# Patient Record
Sex: FEMALE | Race: WHITE | NOT HISPANIC OR LATINO | Employment: OTHER | URBAN - METROPOLITAN AREA
[De-identification: names, ages, dates, MRNs, and addresses within clinical notes are randomized per-mention and may not be internally consistent; named-entity substitution may affect disease eponyms.]

---

## 2023-09-05 PROBLEM — M93.90 OSTEOCHONDROPATHY: Status: ACTIVE | Noted: 2023-09-05

## 2023-09-05 PROBLEM — C50.919 BREAST CANCER (MULTI): Status: ACTIVE | Noted: 2023-09-05

## 2023-09-05 PROBLEM — M62.579: Status: ACTIVE | Noted: 2023-09-05

## 2023-09-05 PROBLEM — E55.9 VITAMIN D DEFICIENCY: Status: ACTIVE | Noted: 2023-09-05

## 2023-09-05 PROBLEM — M21.70 LEG LENGTH DISCREPANCY: Status: ACTIVE | Noted: 2023-09-05

## 2023-09-05 PROBLEM — E78.00 ELEVATED CHOLESTEROL: Status: ACTIVE | Noted: 2023-09-05

## 2023-09-05 PROBLEM — R73.01 IFG (IMPAIRED FASTING GLUCOSE): Status: ACTIVE | Noted: 2023-09-05

## 2023-09-05 PROBLEM — M21.611 BUNION OF RIGHT FOOT: Status: ACTIVE | Noted: 2023-09-05

## 2023-09-05 PROBLEM — M20.41 ACQUIRED HAMMER TOE OF RIGHT FOOT: Status: ACTIVE | Noted: 2023-09-05

## 2023-09-05 PROBLEM — M19.071: Status: ACTIVE | Noted: 2023-09-05

## 2023-09-05 PROBLEM — G62.9 NEUROPATHY: Status: ACTIVE | Noted: 2023-09-05

## 2023-09-05 PROBLEM — D32.9 MENINGIOMA (MULTI): Status: ACTIVE | Noted: 2023-09-05

## 2023-09-05 PROBLEM — Z85.3 PERSONAL HISTORY OF MALIGNANT NEOPLASM OF BREAST: Status: ACTIVE | Noted: 2023-09-05

## 2023-09-05 PROBLEM — F41.9 ANXIETY: Status: ACTIVE | Noted: 2023-09-05

## 2023-09-05 PROBLEM — S32.509D: Status: ACTIVE | Noted: 2023-09-05

## 2023-09-05 PROBLEM — R20.0 NUMBNESS: Status: ACTIVE | Noted: 2023-09-05

## 2023-09-05 PROBLEM — M77.8 FLEXOR HALLUCIS LONGUS TENDINITIS: Status: ACTIVE | Noted: 2023-09-05

## 2023-09-05 PROBLEM — M79.605 PAIN OF LEFT LOWER EXTREMITY: Status: ACTIVE | Noted: 2023-09-05

## 2023-09-05 PROBLEM — M79.604 PAIN OF RIGHT LOWER EXTREMITY: Status: ACTIVE | Noted: 2023-09-05

## 2023-09-05 PROBLEM — G93.89 BRAIN MASS: Status: ACTIVE | Noted: 2023-09-05

## 2023-09-05 PROBLEM — G57.61 MORTON'S NEUROMA OF RIGHT FOOT: Status: ACTIVE | Noted: 2023-09-05

## 2023-09-05 PROBLEM — I10 ESSENTIAL HYPERTENSION: Status: ACTIVE | Noted: 2023-09-05

## 2023-09-05 RX ORDER — TURMERIC ROOT EXTRACT 500 MG
1 TABLET ORAL 2 TIMES DAILY
COMMUNITY

## 2023-09-05 RX ORDER — SERTRALINE HYDROCHLORIDE 25 MG/1
1 TABLET, FILM COATED ORAL DAILY
COMMUNITY
Start: 2023-02-01 | End: 2023-12-29 | Stop reason: DRUGHIGH

## 2023-09-05 RX ORDER — CALCIUM CARB/VITAMIN D3/VIT K1 500-500-40
1 TABLET,CHEWABLE ORAL DAILY
COMMUNITY
Start: 2018-12-13

## 2023-09-05 RX ORDER — ATENOLOL 50 MG/1
1 TABLET ORAL DAILY
COMMUNITY

## 2023-09-05 RX ORDER — LETROZOLE 2.5 MG/1
1 TABLET, FILM COATED ORAL
COMMUNITY
Start: 2023-08-22

## 2023-09-05 RX ORDER — LOSARTAN POTASSIUM AND HYDROCHLOROTHIAZIDE 12.5; 1 MG/1; MG/1
1 TABLET ORAL DAILY
COMMUNITY
Start: 2021-07-16 | End: 2023-10-11 | Stop reason: SDUPTHER

## 2023-09-05 RX ORDER — ALENDRONATE SODIUM 70 MG/1
1 TABLET ORAL
COMMUNITY
Start: 2018-12-13 | End: 2023-12-29 | Stop reason: ALTCHOICE

## 2023-09-05 RX ORDER — GLUCOSAM/CHONDRO/HERB 149/HYAL 750-100 MG
1 TABLET ORAL DAILY
COMMUNITY

## 2023-09-05 RX ORDER — PRAVASTATIN SODIUM 40 MG/1
1 TABLET ORAL DAILY
COMMUNITY
End: 2024-01-25

## 2023-09-05 RX ORDER — ATENOLOL 25 MG/1
1 TABLET ORAL DAILY
COMMUNITY
Start: 2022-11-23 | End: 2023-10-02 | Stop reason: DRUGHIGH

## 2023-09-05 RX ORDER — MULTIVITAMIN
1 TABLET ORAL DAILY
COMMUNITY

## 2023-10-02 ENCOUNTER — TELEPHONE (OUTPATIENT)
Dept: PRIMARY CARE | Facility: CLINIC | Age: 73
End: 2023-10-02
Payer: MEDICARE

## 2023-10-02 NOTE — TELEPHONE ENCOUNTER
Patient had Atenolol 50 mg sent in to pharmacy last week, which is what was in her chart. Patient called today stating she only take 25 mg. In last office note it states to continue Atenolol 25 mg. Do you want the patient to be taking 50 mg or 25 mg?

## 2023-10-02 NOTE — TELEPHONE ENCOUNTER
Would continue taking atenolol 50 mg.  Just to help the better blood pressure control and some anxiety symptoms she has too much in the way of fatigue she can always cut it in half but lets try the 50 mg daily

## 2023-10-11 DIAGNOSIS — I10 BENIGN HYPERTENSION: Primary | ICD-10-CM

## 2023-10-11 DIAGNOSIS — I10 HYPERTENSION, UNSPECIFIED TYPE: ICD-10-CM

## 2023-10-11 RX ORDER — LOSARTAN POTASSIUM AND HYDROCHLOROTHIAZIDE 12.5; 1 MG/1; MG/1
1 TABLET ORAL DAILY
Qty: 90 TABLET | Refills: 2 | Status: SHIPPED | OUTPATIENT
Start: 2023-10-11 | End: 2023-10-11 | Stop reason: SDUPTHER

## 2023-10-11 RX ORDER — LOSARTAN POTASSIUM AND HYDROCHLOROTHIAZIDE 12.5; 1 MG/1; MG/1
1 TABLET ORAL DAILY
Qty: 90 TABLET | Refills: 2 | Status: SHIPPED | OUTPATIENT
Start: 2023-10-11

## 2023-12-11 ENCOUNTER — LAB (OUTPATIENT)
Dept: LAB | Facility: LAB | Age: 73
End: 2023-12-11
Payer: MEDICARE

## 2023-12-11 DIAGNOSIS — I10 ESSENTIAL (PRIMARY) HYPERTENSION: Primary | ICD-10-CM

## 2023-12-11 DIAGNOSIS — E55.9 VITAMIN D DEFICIENCY, UNSPECIFIED: ICD-10-CM

## 2023-12-11 DIAGNOSIS — E78.00 PURE HYPERCHOLESTEROLEMIA, UNSPECIFIED: ICD-10-CM

## 2023-12-11 DIAGNOSIS — Z85.3 PERSONAL HISTORY OF MALIGNANT NEOPLASM OF BREAST: ICD-10-CM

## 2023-12-11 LAB
25(OH)D3 SERPL-MCNC: 31 NG/ML (ref 31–100)
ALBUMIN SERPL-MCNC: 4.4 G/DL (ref 3.5–5)
ALP BLD-CCNC: 105 U/L (ref 35–125)
ALT SERPL-CCNC: 17 U/L (ref 5–40)
ANION GAP SERPL CALC-SCNC: 13 MMOL/L
AST SERPL-CCNC: 18 U/L (ref 5–40)
BILIRUB SERPL-MCNC: 0.4 MG/DL (ref 0.1–1.2)
BUN SERPL-MCNC: 28 MG/DL (ref 8–25)
CALCIUM SERPL-MCNC: 10.2 MG/DL (ref 8.5–10.4)
CHLORIDE SERPL-SCNC: 106 MMOL/L (ref 97–107)
CHOLEST SERPL-MCNC: 194 MG/DL (ref 133–200)
CHOLEST/HDLC SERPL: 3.4 {RATIO}
CO2 SERPL-SCNC: 22 MMOL/L (ref 24–31)
CREAT SERPL-MCNC: 0.9 MG/DL (ref 0.4–1.6)
ERYTHROCYTE [DISTWIDTH] IN BLOOD BY AUTOMATED COUNT: 13.6 % (ref 11.5–14.5)
GFR SERPL CREATININE-BSD FRML MDRD: 67 ML/MIN/1.73M*2
GLUCOSE SERPL-MCNC: 109 MG/DL (ref 65–99)
HCT VFR BLD AUTO: 43.9 % (ref 36–46)
HDLC SERPL-MCNC: 57 MG/DL
HGB BLD-MCNC: 14.1 G/DL (ref 12–16)
LDLC SERPL CALC-MCNC: 97 MG/DL (ref 65–130)
MCH RBC QN AUTO: 31.3 PG (ref 26–34)
MCHC RBC AUTO-ENTMCNC: 32.1 G/DL (ref 32–36)
MCV RBC AUTO: 98 FL (ref 80–100)
NRBC BLD-RTO: 0 /100 WBCS (ref 0–0)
PLATELET # BLD AUTO: 384 X10*3/UL (ref 150–450)
POTASSIUM SERPL-SCNC: 4.6 MMOL/L (ref 3.4–5.1)
PROT SERPL-MCNC: 7.1 G/DL (ref 5.9–7.9)
RBC # BLD AUTO: 4.5 X10*6/UL (ref 4–5.2)
SODIUM SERPL-SCNC: 141 MMOL/L (ref 133–145)
TRIGL SERPL-MCNC: 202 MG/DL (ref 40–150)
WBC # BLD AUTO: 7.3 X10*3/UL (ref 4.4–11.3)

## 2023-12-11 PROCEDURE — 36415 COLL VENOUS BLD VENIPUNCTURE: CPT

## 2023-12-11 PROCEDURE — 80053 COMPREHEN METABOLIC PANEL: CPT

## 2023-12-11 PROCEDURE — 82306 VITAMIN D 25 HYDROXY: CPT

## 2023-12-11 PROCEDURE — 80061 LIPID PANEL: CPT

## 2023-12-11 PROCEDURE — 85027 COMPLETE CBC AUTOMATED: CPT

## 2023-12-12 ENCOUNTER — TELEPHONE (OUTPATIENT)
Dept: PRIMARY CARE | Facility: CLINIC | Age: 73
End: 2023-12-12
Payer: MEDICARE

## 2023-12-12 NOTE — TELEPHONE ENCOUNTER
Pt c/o dizziness that comes and goes, a feeling of lightheadedness, does not feel real steady when walking.    Has CPE on 12/29/23 and asking if there is any way she can move that up and get everything checked out sooner.  She has already had her labs drawn.  The next available appt 12/20/23 unless you want to make room.  Please advise.  Ph: [phone]

## 2023-12-12 NOTE — TELEPHONE ENCOUNTER
Spoke with patient and advised her that after speaking with Dr. Hazel regarding her symptoms he advised that it seemed that her vertigo has started again and suggests that she begin doing her vertigo exercises again to help with the sx's she's been experiencing. Advised that PT could also be ordered for her if she liked. She then asked about taking anti-histamines to help with the symptoms since she had started doing some reading and found that it could help, stated that she was already taking the allegra however asked which one was the best one, advised that the best one is the one that works for her for her allergies not for vertigo. Confirmed her upcoming appt to be 12/29/23 at 10 and to call back if needed, stated thanks and undersdtanding

## 2023-12-27 PROBLEM — E78.5 HYPERLIPIDEMIA: Status: ACTIVE | Noted: 2023-12-27

## 2023-12-27 PROBLEM — D32.0 BENIGN NEOPLASM OF CEREBRAL MENINGES (MULTI): Status: ACTIVE | Noted: 2023-12-27

## 2023-12-27 PROBLEM — M85.89 OSTEOPENIA OF MULTIPLE SITES: Status: ACTIVE | Noted: 2017-05-10

## 2023-12-29 ENCOUNTER — OFFICE VISIT (OUTPATIENT)
Dept: PRIMARY CARE | Facility: CLINIC | Age: 73
End: 2023-12-29
Payer: MEDICARE

## 2023-12-29 VITALS
HEART RATE: 78 BPM | SYSTOLIC BLOOD PRESSURE: 146 MMHG | WEIGHT: 185 LBS | TEMPERATURE: 97.8 F | BODY MASS INDEX: 28.04 KG/M2 | OXYGEN SATURATION: 96 % | HEIGHT: 68 IN | DIASTOLIC BLOOD PRESSURE: 86 MMHG

## 2023-12-29 DIAGNOSIS — R73.01 IFG (IMPAIRED FASTING GLUCOSE): ICD-10-CM

## 2023-12-29 DIAGNOSIS — G62.9 NEUROPATHY: ICD-10-CM

## 2023-12-29 DIAGNOSIS — F41.9 ANXIETY: ICD-10-CM

## 2023-12-29 DIAGNOSIS — M15.9 PRIMARY OSTEOARTHRITIS INVOLVING MULTIPLE JOINTS: ICD-10-CM

## 2023-12-29 DIAGNOSIS — I10 ESSENTIAL HYPERTENSION: ICD-10-CM

## 2023-12-29 DIAGNOSIS — R42 VERTIGO: ICD-10-CM

## 2023-12-29 DIAGNOSIS — E55.9 VITAMIN D DEFICIENCY: ICD-10-CM

## 2023-12-29 DIAGNOSIS — E78.00 ELEVATED CHOLESTEROL: ICD-10-CM

## 2023-12-29 DIAGNOSIS — Z00.00 ROUTINE GENERAL MEDICAL EXAMINATION AT HEALTH CARE FACILITY: ICD-10-CM

## 2023-12-29 DIAGNOSIS — D32.9 MENINGIOMA (MULTI): ICD-10-CM

## 2023-12-29 DIAGNOSIS — Z00.00 ROUTINE MEDICAL EXAM: Primary | ICD-10-CM

## 2023-12-29 PROBLEM — M15.0 PRIMARY OSTEOARTHRITIS INVOLVING MULTIPLE JOINTS: Status: ACTIVE | Noted: 2023-12-29

## 2023-12-29 PROCEDURE — G0439 PPPS, SUBSEQ VISIT: HCPCS | Performed by: INTERNAL MEDICINE

## 2023-12-29 PROCEDURE — 99215 OFFICE O/P EST HI 40 MIN: CPT | Performed by: INTERNAL MEDICINE

## 2023-12-29 PROCEDURE — 1159F MED LIST DOCD IN RCRD: CPT | Performed by: INTERNAL MEDICINE

## 2023-12-29 PROCEDURE — 1125F AMNT PAIN NOTED PAIN PRSNT: CPT | Performed by: INTERNAL MEDICINE

## 2023-12-29 PROCEDURE — 1036F TOBACCO NON-USER: CPT | Performed by: INTERNAL MEDICINE

## 2023-12-29 PROCEDURE — 3077F SYST BP >= 140 MM HG: CPT | Performed by: INTERNAL MEDICINE

## 2023-12-29 PROCEDURE — 3079F DIAST BP 80-89 MM HG: CPT | Performed by: INTERNAL MEDICINE

## 2023-12-29 RX ORDER — SERTRALINE HYDROCHLORIDE 50 MG/1
50 TABLET, FILM COATED ORAL DAILY
Qty: 90 TABLET | Refills: 2 | Status: SHIPPED | OUTPATIENT
Start: 2023-12-29 | End: 2024-12-28

## 2023-12-29 ASSESSMENT — PAIN SCALES - GENERAL: PAINLEVEL: 2

## 2023-12-29 ASSESSMENT — PATIENT HEALTH QUESTIONNAIRE - PHQ9
SUM OF ALL RESPONSES TO PHQ9 QUESTIONS 1 AND 2: 0
SUM OF ALL RESPONSES TO PHQ9 QUESTIONS 1 AND 2: 0
1. LITTLE INTEREST OR PLEASURE IN DOING THINGS: NOT AT ALL
1. LITTLE INTEREST OR PLEASURE IN DOING THINGS: NOT AT ALL
2. FEELING DOWN, DEPRESSED OR HOPELESS: NOT AT ALL
2. FEELING DOWN, DEPRESSED OR HOPELESS: NOT AT ALL

## 2023-12-29 ASSESSMENT — ENCOUNTER SYMPTOMS
LOSS OF SENSATION IN FEET: 0
PALPITATIONS: 0
DEPRESSION: 0
OCCASIONAL FEELINGS OF UNSTEADINESS: 0
SHORTNESS OF BREATH: 0

## 2023-12-29 NOTE — PATIENT INSTRUCTIONS
Diagnoses and all orders for this visit:  Routine medical exam  Elevated cholesterol  Comments:  LDL 97 on Pravastatin  Orders:  -     Lipid Panel; Future  -     Comprehensive Metabolic Panel; Future  Essential hypertension  Comments:  Continue atenolol 50 mg daily and Losartan/HCTZ. Labs OK.  IFG (impaired fasting glucose)  Comments:  HGA1C 5.5% doing well.  Orders:  -     Hemoglobin A1C; Future  -     CBC; Future  Vitamin D deficiency  Anxiety  Comments:  Increase Sertraline 50 mg daily.  Orders:  -     sertraline (Zoloft) 50 mg tablet; Take 1 tablet (50 mg) by mouth once daily.  Neuropathy  Routine general medical examination at [facility] facility  Meningioma (CMS/[facility])  -     Referral to Physical Therapy; Future  Vertigo  Comments:  Exercises reviewed. PT for vestibular training.  Orders:  -     Referral to Physical Therapy; Future  Primary osteoarthritis involving multiple joints  Comments:   for left TKA spring.

## 2023-12-29 NOTE — PROGRESS NOTES
[facility]: [facility]  MEDICARE WELLNESS EXAM      Leanne Mims is a 73 y.o. female that is presenting today for Annual Exam.    Assessment/Plan    Diagnoses and all orders for this visit:  Routine medical exam  Elevated cholesterol  Comments:  LDL 97 on Pravastatin  Essential hypertension  Comments:  Continue atenolol 50 mg daily and Losartan/HCTZ. Labs OK.  IFG (impaired fasting glucose)  Comments:  HGA1C 5.5% doing well.  Vitamin D deficiency  Anxiety  Comments:  Increase Sertraline 50 mg daily.  Neuropathy  Routine general medical examination at [facility]  Meningioma (CMS/[facility])  -     Referral to Physical Therapy; Future  Vertigo  Comments:  Exercises reviewed. PT for vestibular training.  Orders:  -     Referral to Physical Therapy; Future    ADVANCED CARE PLANNING  Advanced Care Planning was discussed with patient:  The patient does not have an advanced care plan on file. The patient does not have an active surrogate decision-maker on file.  Encouraged the patient to confirm that Living Will and Healthcare Power of  (HCPoA) are accurate and up to date.  Encouraged the patient to confirm that our office be provided a copy of any documentation in the event that anything changes.    ACTIVITIES OF DAILY LIVING  Basic ADLs:  Bathing: Independent, Dressing: Independent, Toileting: Independent, Transferring: Independent, Continence: Independent, Feeding: Independent.    Instrumental ADLs:  Ability to use phone: Independent, Shopping: Independent, Cooking: Independent, House-keeping: Independent, Laundry: Independent, Transportation: Independent, Medication Management: Independent, Finance Management: Independent.    Subjective   Wellness visit. Over all health status doing well. Diet reviewed, Mediterranean, low sugar diet suggested. No  active depression, or little interest in doing activites or hopelessness. Home safety reviewed, well light, no throw rugs,etc. No falls.  Advanced directives filled out at home.  ADL, and instrumental ADL no limits doing well. Vision screen eye exam yearly, hearing screen 6 ft whisper test normal.  No cognitive decline observed. Screening sheet given. Anxiety worse causes BP to increase better on recheck. Vertigo comes goes. ENT/neurosurgery.      Review of Systems   Respiratory:  Negative for shortness of breath.    Cardiovascular:  Negative for chest pain and palpitations.   All other systems reviewed and are negative.    Objective   Vitals:    12/29/23 1000   BP: 146/86   Pulse: 78   Temp: 36.6 °C (97.8 °F)   SpO2: 96%      Body mass index is 28.55 kg/m².  Physical Exam  Constitutional:       General: She is not in acute distress.     Appearance: She is not toxic-appearing.   HENT:      Head: Normocephalic and atraumatic.      Right Ear: Tympanic membrane and ear canal normal.      Left Ear: Tympanic membrane and ear canal normal.      Nose: Nose normal.      Mouth/Throat:      Pharynx: Oropharynx is clear.   Eyes:      Extraocular Movements: Extraocular movements intact.      Pupils: Pupils are equal, round, and reactive to light.   Cardiovascular:      Rate and Rhythm: Normal rate and regular rhythm.      Heart sounds: Normal heart sounds. No murmur heard.  Pulmonary:      Breath sounds: Normal breath sounds.   Abdominal:      General: Bowel sounds are normal. There is no distension.      Palpations: Abdomen is soft. There is no mass.      Tenderness: There is no abdominal tenderness.   Musculoskeletal:         General: No swelling or tenderness.      Right lower leg: No edema.      Left lower leg: No edema.   Skin:     General: Skin is warm and dry.   Neurological:      General: No focal deficit present.      Mental Status: She is oriented to person, place, and time.      Sensory: No sensory deficit.      Motor: No weakness.      Deep Tendon Reflexes: Reflexes normal.      Comments: Mild vertigo, no tinnitus, or hearing issues.       Diagnostic  "Results   Lab Results   Component Value Date    GLUCOSE 109 (H) 12/11/2023    CALCIUM 10.2 12/11/2023     12/11/2023    K 4.6 12/11/2023    CO2 22 (L) 12/11/2023     12/11/2023    BUN 28 (H) 12/11/2023    CREATININE 0.90 12/11/2023     Lab Results   Component Value Date    ALT 17 12/11/2023    AST 18 12/11/2023    ALKPHOS 105 12/11/2023    BILITOT 0.4 12/11/2023     Lab Results   Component Value Date    WBC 7.3 12/11/2023    HGB 14.1 12/11/2023    HCT 43.9 12/11/2023    MCV 98 12/11/2023     12/11/2023     Lab Results   Component Value Date    CHOL 194 12/11/2023    CHOL 186 05/24/2023    CHOL 186 05/05/2022     Lab Results   Component Value Date    HDL 57.0 12/11/2023    HDL 64 05/24/2023    HDL 62 05/05/2022     Lab Results   Component Value Date    LDLCALC 97 12/11/2023    LDLCALC 92 05/24/2023    LDLCALC 93 05/05/2022     Lab Results   Component Value Date    TRIG 202 (H) 12/11/2023    TRIG 148 05/24/2023    TRIG 155 (H) 05/05/2022     No components found for: \"CHOLHDL\"  Lab Results   Component Value Date    HGBA1C 5.5 05/24/2023     Other labs not included in the list above reviewed either before or during this encounter.    History   Past Medical History:   Diagnosis Date    Anxiety 09/05/2023    Elevated cholesterol 09/05/2023    Essential hypertension 09/05/2023    IFG (impaired fasting glucose) 09/05/2023    Meningioma (CMS/HCC) 09/05/2023 5/23 w/wo menigioma No issues  neurosurgery. craniovertebarl junction    Neuropathy 09/05/2023    R>L TSH,B12, SPEP M protein? Monoclonal anaylsis neg. 5/22.    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     History reviewed. No pertinent surgical history.  Family History   Problem Relation Name Age of Onset    Heart disease Mother      Hypertension Mother      Cancer Father   "     Social History     Socioeconomic History    Marital status:      Spouse name: Not on file    Number of children: Not on file    Years of education: Not on file    Highest education level: Not on file   Occupational History    Not on file   Tobacco Use    Smoking status: Never     Passive exposure: Never    Smokeless tobacco: Never   Vaping Use    Vaping Use: Never used   Substance and Sexual Activity    Alcohol use: Yes    Drug use: Never    Sexual activity: Not on file   Other Topics Concern    Not on file   Social History Narrative    Not on file     Social Determinants of Health     Financial Resource Strain: Not on file   Food Insecurity: Not on file   Transportation Needs: Not on file   Physical Activity: Not on file   Stress: Not on file   Social Connections: Not on file   Intimate Partner Violence: Not on file   Housing Stability: Not on file     Allergies   Allergen Reactions    Penicillins Other     Difficulty breathing      Current Outpatient Medications on File Prior to Visit   Medication Sig Dispense Refill    atenolol (Tenormin) 50 mg tablet Take 1 tablet (50 mg) by mouth once daily at bedtime. 1/2 tab daily      calcium-vitamin D3-vitamin K (Viactiv) 500-500-40 mg-unit-mcg chewable tablet Chew 1 tablet once daily. With a meal      letrozole (Femara) 2.5 mg tablet Take 1 tablet (2.5 mg total) by mouth once daily.      losartan-hydrochlorothiazide (Hyzaar) 100-12.5 mg tablet TAKE 1 TABLET BY MOUTH EVERY DAY 90 tablet 2    multivitamin tablet Take 1 tablet by mouth once daily.      NON FORMULARY Take by mouth once daily. Juice Plus Fibre ..      omega 3-dha-epa-fish oil (Fish OiL) 1,000 mg (120 mg-180 mg) capsule Take 1 capsule (1,000 mg) by mouth once daily.      pravastatin (Pravachol) 40 mg tablet Take 1 tablet (40 mg) by mouth once daily.      sertraline (Zoloft) 25 mg tablet Take 1 tablet (25 mg) by mouth once daily.      turmeric root extract 500 mg tablet Take by mouth once daily.       [DISCONTINUED] alendronate (Fosamax) 70 mg tablet Take 1 tablet (70 mg) by mouth 1 (one) time per week.       No current facility-administered medications on file prior to visit.     Immunization History   Administered Date(s) Administered    Pneumococcal conjugate vaccine, 13-valent (PREVNAR 13) 10/18/2019    Pneumococcal polysaccharide vaccine, 23-valent, age 2 years and older (PNEUMOVAX 23) 10/27/2014    Tdap vaccine, age 7 year and older (BOOSTRIX) 08/01/2009, 10/18/2019    Zoster, live 04/22/2011     Patient's medical history was reviewed and updated either before or during this encounter.     Barak Hazel MD

## 2024-01-25 DIAGNOSIS — E78.00 ELEVATED CHOLESTEROL: ICD-10-CM

## 2024-01-25 RX ORDER — PRAVASTATIN SODIUM 40 MG/1
40 TABLET ORAL DAILY
Qty: 90 TABLET | Refills: 1 | Status: SHIPPED | OUTPATIENT
Start: 2024-01-25

## 2024-02-27 DIAGNOSIS — G93.89 BRAIN MASS: ICD-10-CM

## 2024-04-16 ENCOUNTER — TELEPHONE (OUTPATIENT)
Dept: NEUROSURGERY | Facility: HOSPITAL | Age: 74
End: 2024-04-16
Payer: MEDICARE

## 2024-04-16 NOTE — TELEPHONE ENCOUNTER
Pt had called in and said she was in [place] and that Dr. Barba called her. Ilooked at the new and old system and see a call from Dr. 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 No indication for surgical intervention at this time. Follow up in 1 year with new MRI brain w/wo contrast.   I left message that order is in the system and that she can call [phone] to schedule.

## 2024-05-02 PROBLEM — G93.89 MASS OF BRAIN: Status: ACTIVE | Noted: 2023-01-16

## 2024-05-02 PROBLEM — J30.2 SEASONAL ALLERGIC RHINITIS: Status: ACTIVE | Noted: 2024-05-02

## 2024-05-02 PROBLEM — D49.7 NEOPLASM OF MENINGES: Status: ACTIVE | Noted: 2023-07-12

## 2024-05-02 PROBLEM — Z86.79 HISTORY OF HYPERTENSION: Status: ACTIVE | Noted: 2024-05-02

## 2024-05-02 PROBLEM — S93.609A SPRAIN OF FOOT: Status: ACTIVE | Noted: 2024-05-02

## 2024-05-02 PROBLEM — M77.50 TENDINITIS OF FOOT: Status: ACTIVE | Noted: 2023-09-05

## 2024-05-02 PROBLEM — J01.10 ACUTE FRONTAL SINUSITIS: Status: ACTIVE | Noted: 2022-12-30

## 2024-05-02 PROBLEM — M19.071 OSTEOARTHRITIS OF RIGHT FOOT: Status: ACTIVE | Noted: 2022-08-24

## 2024-05-02 PROBLEM — M25.476 SWELLING OF FIRST METATARSOPHALANGEAL (MTP) JOINT: Status: ACTIVE | Noted: 2023-09-05

## 2024-05-02 PROBLEM — Z86.39 HISTORY OF HYPERCHOLESTEROLEMIA: Status: ACTIVE | Noted: 2024-05-02

## 2024-05-02 PROBLEM — R21 RASH AND OTHER NONSPECIFIC SKIN ERUPTION: Status: ACTIVE | Noted: 2022-10-11

## 2024-05-02 RX ORDER — CLINDAMYCIN HYDROCHLORIDE 150 MG/1
CAPSULE ORAL
COMMUNITY
Start: 2023-01-18 | End: 2024-05-07 | Stop reason: WASHOUT

## 2024-05-02 RX ORDER — MULTIVITAMIN
TABLET ORAL EVERY 24 HOURS
COMMUNITY
End: 2024-05-07 | Stop reason: WASHOUT

## 2024-05-02 RX ORDER — OXYCODONE AND ACETAMINOPHEN 5; 325 MG/1; MG/1
TABLET ORAL
COMMUNITY
Start: 2023-02-20 | End: 2024-05-07 | Stop reason: WASHOUT

## 2024-05-02 RX ORDER — FLUTICASONE PROPIONATE 50 MCG
SPRAY, SUSPENSION (ML) NASAL
COMMUNITY
Start: 2023-01-12 | End: 2024-05-07 | Stop reason: WASHOUT

## 2024-05-02 RX ORDER — AZITHROMYCIN 250 MG/1
TABLET, FILM COATED ORAL
COMMUNITY
Start: 2023-01-19 | End: 2024-05-07 | Stop reason: WASHOUT

## 2024-05-02 RX ORDER — CHLORHEXIDINE GLUCONATE ORAL RINSE 1.2 MG/ML
SOLUTION DENTAL
COMMUNITY
Start: 2023-01-24 | End: 2024-05-07 | Stop reason: WASHOUT

## 2024-05-02 RX ORDER — METHYLPREDNISOLONE 4 MG/1
TABLET ORAL
COMMUNITY
Start: 2023-01-24 | End: 2024-05-07 | Stop reason: WASHOUT

## 2024-05-07 ENCOUNTER — OFFICE VISIT (OUTPATIENT)
Dept: PRIMARY CARE | Facility: CLINIC | Age: 74
End: 2024-05-07
Payer: MEDICARE

## 2024-05-07 VITALS
WEIGHT: 190 LBS | HEIGHT: 67 IN | DIASTOLIC BLOOD PRESSURE: 76 MMHG | OXYGEN SATURATION: 97 % | HEART RATE: 71 BPM | TEMPERATURE: 97.3 F | BODY MASS INDEX: 29.82 KG/M2 | SYSTOLIC BLOOD PRESSURE: 130 MMHG

## 2024-05-07 DIAGNOSIS — Z01.818 PREOP EXAM FOR INTERNAL MEDICINE: Primary | ICD-10-CM

## 2024-05-07 PROCEDURE — 3078F DIAST BP <80 MM HG: CPT | Performed by: LICENSED PRACTICAL NURSE

## 2024-05-07 PROCEDURE — 1126F AMNT PAIN NOTED NONE PRSNT: CPT | Performed by: LICENSED PRACTICAL NURSE

## 2024-05-07 PROCEDURE — 99213 OFFICE O/P EST LOW 20 MIN: CPT | Performed by: LICENSED PRACTICAL NURSE

## 2024-05-07 PROCEDURE — 3075F SYST BP GE 130 - 139MM HG: CPT | Performed by: LICENSED PRACTICAL NURSE

## 2024-05-07 PROCEDURE — 1159F MED LIST DOCD IN RCRD: CPT | Performed by: LICENSED PRACTICAL NURSE

## 2024-05-07 PROCEDURE — 1160F RVW MEDS BY RX/DR IN RCRD: CPT | Performed by: LICENSED PRACTICAL NURSE

## 2024-05-07 PROCEDURE — 1036F TOBACCO NON-USER: CPT | Performed by: LICENSED PRACTICAL NURSE

## 2024-05-07 ASSESSMENT — PATIENT HEALTH QUESTIONNAIRE - PHQ9
1. LITTLE INTEREST OR PLEASURE IN DOING THINGS: NOT AT ALL
SUM OF ALL RESPONSES TO PHQ9 QUESTIONS 1 AND 2: 0
2. FEELING DOWN, DEPRESSED OR HOPELESS: NOT AT ALL

## 2024-05-07 ASSESSMENT — PAIN SCALES - GENERAL: PAINLEVEL: 0-NO PAIN

## 2024-05-07 NOTE — PROGRESS NOTES
[facility]: [facility]  PROGRESS NOTE      Leanne Mims is a 73 y.o. female that is presenting today for Pre-op Clearance (Surgical clearance for total left knee replacemant).      Subjective   Ms. Mims is a 73-year-old female presenting today for preoperative medical clearance.  She will be having a left complex total knee replacement with Dr. Adarsh Thorpe, on May 21, 2024 at [facility].  She is scheduled for preadmission testing on May 15, 2024.  She reports her chronic medical conditions are stable and that she is without new health complaints.  She is able to complete household chores and walk up a flight of steps without respiratory distress.      Review of Systems   All other systems reviewed and are negative.     Objective   Vitals:    05/07/24 0810   BP: 130/76   Pulse: 71   Temp: 36.3 °C (97.3 °F)   SpO2: 97%      Body mass index is 29.54 kg/m².  Physical Exam  Cardiovascular:      Rate and Rhythm: Normal rate and regular rhythm.      Heart sounds: Normal heart sounds and S2 normal.   Pulmonary:      Effort: No respiratory distress.      Breath sounds: No wheezing or rales.   Abdominal:      General: Bowel sounds are normal.      Palpations: Abdomen is soft.   Musculoskeletal:      Right lower leg: No edema.      Left lower leg: No edema.   Skin:     General: Skin is warm and dry.       Diagnostic Results   Lab Results   Component Value Date    GLUCOSE 109 (H) 12/11/2023    CALCIUM 10.2 12/11/2023     12/11/2023    K 4.6 12/11/2023    CO2 22 (L) 12/11/2023     12/11/2023    BUN 28 (H) 12/11/2023    CREATININE 0.90 12/11/2023     Lab Results   Component Value Date    ALT 17 12/11/2023    AST 18 12/11/2023    ALKPHOS 105 12/11/2023    BILITOT 0.4 12/11/2023     Lab Results   Component Value Date    WBC 7.3 12/11/2023    HGB 14.1 12/11/2023    HCT 43.9 12/11/2023    MCV 98 12/11/2023     12/11/2023     Lab Results   Component Value Date    CHOL 194  "12/11/2023    CHOL 186 05/24/2023    CHOL 186 05/05/2022     Lab Results   Component Value Date    HDL 57.0 12/11/2023    HDL 64 05/24/2023    HDL 62 05/05/2022     Lab Results   Component Value Date    LDLCALC 97 12/11/2023    LDLCALC 92 05/24/2023    LDLCALC 93 05/05/2022     Lab Results   Component Value Date    TRIG 202 (H) 12/11/2023    TRIG 148 05/24/2023    TRIG 155 (H) 05/05/2022     No components found for: \"CHOLHDL\"  Lab Results   Component Value Date    HGBA1C 5.5 05/24/2023     Other labs not included in the list above were reviewed either before or during this encounter.    History    Past Medical History:   Diagnosis Date    Anxiety 09/05/2023    Elevated cholesterol 09/05/2023    Essential hypertension 09/05/2023    IFG (impaired fasting glucose) 09/05/2023    Meningioma (Multi) 09/05/2023 5/23 w/wo menigioma No issues  neurosurgery. craniovertebarl junction    Neuropathy 09/05/2023    R>L TSH,B12, SPEP M protein? Monoclonal anaylsis neg. 5/22.    Personal history of malignant neoplasm of breast 09/05/2023    Left XRT, Letrizole 11/17 ed    Primary osteoarthritis involving multiple joints 12/29/2023     for left knee DJD.    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     History reviewed. No pertinent surgical history.  Family History   Problem Relation Name Age of Onset    Heart disease Mother      Hypertension Mother      Cancer Father       Social History     Socioeconomic History    Marital status:      Spouse name: Not on file    Number of children: Not on file    Years of education: Not on file    Highest education level: Not on file   Occupational History    Not on file   Tobacco Use    Smoking status: Never     Passive exposure: Never    Smokeless tobacco: Never   Vaping Use    Vaping status: Never Used   Substance and Sexual Activity    Alcohol use: Yes    Drug use: Never    Sexual activity: Not " on file   Other Topics Concern    Not on file   Social History Narrative    Not on file     Social Determinants of Health     Financial Resource Strain: Not on file   Food Insecurity: Not on file   Transportation Needs: Not on file   Physical Activity: Not on file   Stress: Not on file   Social Connections: Not on file   Intimate Partner Violence: Not on file   Housing Stability: Not on file     Allergies   Allergen Reactions    Penicillins Other     Difficulty breathing      Current Outpatient Medications on File Prior to Visit   Medication Sig Dispense Refill    atenolol (Tenormin) 50 mg tablet Take 1 tablet (50 mg) by mouth once daily at bedtime. 1/2 tab daily      calcium-vitamin D3-vitamin K (Viactiv) 500-500-40 mg-unit-mcg chewable tablet Chew 1 tablet once daily. With a meal      letrozole (Femara) 2.5 mg tablet Take 1 tablet (2.5 mg total) by mouth once daily.      losartan-hydrochlorothiazide (Hyzaar) 100-12.5 mg tablet TAKE 1 TABLET BY MOUTH EVERY DAY 90 tablet 2    multivitamin tablet Take 1 tablet by mouth once daily.      NON FORMULARY Take by mouth once daily. Juice Plus Fibre ..      omega 3-dha-epa-fish oil (Fish OiL) 1,000 mg (120 mg-180 mg) capsule Take 1 capsule (1,000 mg) by mouth once daily.      pravastatin (Pravachol) 40 mg tablet TAKE 1 TABLET BY MOUTH EVERY DAY 90 tablet 1    sertraline (Zoloft) 50 mg tablet Take 1 tablet (50 mg) by mouth once daily. 90 tablet 2    turmeric root extract 500 mg tablet Take by mouth once daily.      [DISCONTINUED] azithromycin (Zithromax) 250 mg tablet Take by mouth.      [DISCONTINUED] chlorhexidine (Peridex) 0.12 % solution Take by mouth.      [DISCONTINUED] clindamycin (Cleocin) 150 mg capsule Take by mouth.      [DISCONTINUED] fluticasone (Flonase) 50 mcg/actuation nasal spray Administer into affected nostril(s).      [DISCONTINUED] methylPREDNISolone (Medrol Dospak) 4 mg tablets Take by mouth.      [DISCONTINUED] oxyCODONE-acetaminophen (Percocet) 5-325  mg tablet Take by mouth.      [DISCONTINUED] multivit with min-folic acid 0.4 mg tablet Take by mouth once every 24 hours.       No current facility-administered medications on file prior to visit.     Immunization History   Administered Date(s) Administered    Pneumococcal conjugate vaccine, 13-valent (PREVNAR 13) 10/18/2019    Pneumococcal polysaccharide vaccine, 23-valent, age 2 years and older (PNEUMOVAX 23) 10/27/2014    Tdap vaccine, age 7 year and older (BOOSTRIX, ADACEL) 08/01/2009, 10/18/2019    Zoster, live 04/22/2011     Patient's medical history was reviewed and updated either before or during this encounter.       Assessment/Plan   Problem List Items Addressed This Visit       Preop exam for internal medicine - Primary   Patient is here today for preoperative evaluation.  - Revised Cardiac Risk Index: Class II Risk (6% risk of intraoperative or 30-day cardiovascular event). METs >4.     Preoperative Risk: Everything looked great. At this time, patient is medically optimized for surgery.     -    Reese Ferrera, APRN-CNP

## 2024-05-15 ENCOUNTER — PRE-ADMISSION TESTING (OUTPATIENT)
Dept: PREADMISSION TESTING | Facility: HOSPITAL | Age: 74
End: 2024-05-15
Payer: MEDICARE

## 2024-05-15 ENCOUNTER — LAB (OUTPATIENT)
Dept: LAB | Facility: LAB | Age: 74
End: 2024-05-15
Payer: MEDICARE

## 2024-05-15 VITALS
TEMPERATURE: 98.6 F | SYSTOLIC BLOOD PRESSURE: 155 MMHG | HEIGHT: 68 IN | OXYGEN SATURATION: 98 % | DIASTOLIC BLOOD PRESSURE: 78 MMHG | WEIGHT: 187.3 LBS | HEART RATE: 70 BPM | BODY MASS INDEX: 28.39 KG/M2

## 2024-05-15 DIAGNOSIS — R73.03 PREDIABETES: ICD-10-CM

## 2024-05-15 DIAGNOSIS — Z01.818 PREOP TESTING: Primary | ICD-10-CM

## 2024-05-15 DIAGNOSIS — Z01.818 PREOP TESTING: ICD-10-CM

## 2024-05-15 LAB
ANION GAP SERPL CALC-SCNC: 14 MMOL/L
APPEARANCE UR: ABNORMAL
BACTERIA #/AREA URNS AUTO: ABNORMAL /HPF
BASOPHILS # BLD AUTO: 0.04 X10*3/UL (ref 0–0.1)
BASOPHILS NFR BLD AUTO: 0.7 %
BILIRUB UR STRIP.AUTO-MCNC: NEGATIVE MG/DL
BUN SERPL-MCNC: 23 MG/DL (ref 8–25)
CALCIUM SERPL-MCNC: 10.4 MG/DL (ref 8.5–10.4)
CHLORIDE SERPL-SCNC: 105 MMOL/L (ref 97–107)
CO2 SERPL-SCNC: 23 MMOL/L (ref 24–31)
COLOR UR: ABNORMAL
CREAT SERPL-MCNC: 0.8 MG/DL (ref 0.4–1.6)
EGFRCR SERPLBLD CKD-EPI 2021: 77 ML/MIN/1.73M*2
EOSINOPHIL # BLD AUTO: 0.17 X10*3/UL (ref 0–0.4)
EOSINOPHIL NFR BLD AUTO: 2.8 %
ERYTHROCYTE [DISTWIDTH] IN BLOOD BY AUTOMATED COUNT: 13.6 % (ref 11.5–14.5)
EST. AVERAGE GLUCOSE BLD GHB EST-MCNC: 131 MG/DL
GLUCOSE SERPL-MCNC: 101 MG/DL (ref 65–99)
GLUCOSE UR STRIP.AUTO-MCNC: NORMAL MG/DL
HBA1C MFR BLD: 6.2 %
HCT VFR BLD AUTO: 42.1 % (ref 36–46)
HGB BLD-MCNC: 13.6 G/DL (ref 12–16)
HOLD SPECIMEN: NORMAL
IMM GRANULOCYTES # BLD AUTO: 0.01 X10*3/UL (ref 0–0.5)
IMM GRANULOCYTES NFR BLD AUTO: 0.2 % (ref 0–0.9)
KETONES UR STRIP.AUTO-MCNC: NEGATIVE MG/DL
LEUKOCYTE ESTERASE UR QL STRIP.AUTO: ABNORMAL
LYMPHOCYTES # BLD AUTO: 1.59 X10*3/UL (ref 0.8–3)
LYMPHOCYTES NFR BLD AUTO: 26.4 %
MCH RBC QN AUTO: 31.4 PG (ref 26–34)
MCHC RBC AUTO-ENTMCNC: 32.3 G/DL (ref 32–36)
MCV RBC AUTO: 97 FL (ref 80–100)
MONOCYTES # BLD AUTO: 0.66 X10*3/UL (ref 0.05–0.8)
MONOCYTES NFR BLD AUTO: 10.9 %
NEUTROPHILS # BLD AUTO: 3.56 X10*3/UL (ref 1.6–5.5)
NEUTROPHILS NFR BLD AUTO: 59 %
NITRITE UR QL STRIP.AUTO: ABNORMAL
NRBC BLD-RTO: 0 /100 WBCS (ref 0–0)
PH UR STRIP.AUTO: 6.5 [PH]
PLATELET # BLD AUTO: 307 X10*3/UL (ref 150–450)
POTASSIUM SERPL-SCNC: 5.5 MMOL/L (ref 3.4–5.1)
PROT UR STRIP.AUTO-MCNC: NEGATIVE MG/DL
RBC # BLD AUTO: 4.33 X10*6/UL (ref 4–5.2)
RBC # UR STRIP.AUTO: NEGATIVE /UL
RBC #/AREA URNS AUTO: ABNORMAL /HPF
SODIUM SERPL-SCNC: 142 MMOL/L (ref 133–145)
SP GR UR STRIP.AUTO: 1.02
SQUAMOUS #/AREA URNS AUTO: ABNORMAL /HPF
UROBILINOGEN UR STRIP.AUTO-MCNC: NORMAL MG/DL
WBC # BLD AUTO: 6 X10*3/UL (ref 4.4–11.3)
WBC #/AREA URNS AUTO: ABNORMAL /HPF

## 2024-05-15 PROCEDURE — 87081 CULTURE SCREEN ONLY: CPT | Mod: WESLAB

## 2024-05-15 PROCEDURE — 36415 COLL VENOUS BLD VENIPUNCTURE: CPT

## 2024-05-15 PROCEDURE — 85025 COMPLETE CBC W/AUTO DIFF WBC: CPT

## 2024-05-15 PROCEDURE — 93005 ELECTROCARDIOGRAM TRACING: CPT

## 2024-05-15 PROCEDURE — 80048 BASIC METABOLIC PNL TOTAL CA: CPT

## 2024-05-15 PROCEDURE — 87186 SC STD MICRODIL/AGAR DIL: CPT

## 2024-05-15 PROCEDURE — 83036 HEMOGLOBIN GLYCOSYLATED A1C: CPT

## 2024-05-15 PROCEDURE — 81001 URINALYSIS AUTO W/SCOPE: CPT

## 2024-05-15 PROCEDURE — 87086 URINE CULTURE/COLONY COUNT: CPT

## 2024-05-15 RX ORDER — IBUPROFEN 200 MG
200 TABLET ORAL EVERY 6 HOURS PRN
COMMUNITY

## 2024-05-15 RX ORDER — CHLORHEXIDINE GLUCONATE ORAL RINSE 1.2 MG/ML
15 SOLUTION DENTAL AS NEEDED
Qty: 473 ML | Refills: 0 | Status: SHIPPED | OUTPATIENT
Start: 2024-05-20 | End: 2024-05-22 | Stop reason: HOSPADM

## 2024-05-15 ASSESSMENT — ENCOUNTER SYMPTOMS
CARDIOVASCULAR NEGATIVE: 1
GASTROINTESTINAL NEGATIVE: 1
EYES NEGATIVE: 1
ALLERGIC/IMMUNOLOGIC NEGATIVE: 1
RESPIRATORY NEGATIVE: 1
HEMATOLOGIC/LYMPHATIC NEGATIVE: 1
FALLS: 1
NEUROLOGICAL NEGATIVE: 1
ENDOCRINE NEGATIVE: 1
PSYCHIATRIC NEGATIVE: 1
CONSTITUTIONAL NEGATIVE: 1

## 2024-05-15 ASSESSMENT — DUKE ACTIVITY SCORE INDEX (DASI)
CAN YOU HAVE SEXUAL RELATIONS: YES
DASI METS SCORE: 7.3
TOTAL_SCORE: 36.7
CAN YOU CLIMB A FLIGHT OF STAIRS OR WALK UP A HILL: YES
CAN YOU WALK INDOORS, SUCH AS AROUND YOUR HOUSE: YES
CAN YOU DO YARD WORK LIKE RAKING LEAVES, WEEDING OR PUSHING A MOWER: YES
CAN YOU RUN A SHORT DISTANCE: NO
CAN YOU DO HEAVY WORK AROUND THE HOUSE LIKE SCRUBBING FLOORS OR LIFTING AND MOVING HEAVY FURNITURE: YES
CAN YOU PARTICIPATE IN MODERATE RECREATIONAL ACTIVITIES LIKE GOLF, BOWLING, DANCING, DOUBLES TENNIS OR THROWING A BASEBALL OR FOOTBALL: NO
CAN YOU DO LIGHT WORK AROUND THE HOUSE LIKE DUSTING OR WASHING DISHES: YES
CAN YOU PARTICIPATE IN STRENOUS SPORTS LIKE SWIMMING, SINGLES TENNIS, FOOTBALL, BASKETBALL, OR SKIING: NO
CAN YOU TAKE CARE OF YOURSELF (EAT, DRESS, BATHE, OR USE TOILET): YES
CAN YOU WALK A BLOCK OR TWO ON LEVEL GROUND: YES
CAN YOU DO MODERATE WORK AROUND THE HOUSE LIKE VACUUMING, SWEEPING FLOORS OR CARRYING GROCERIES: YES

## 2024-05-15 ASSESSMENT — PAIN SCALES - GENERAL: PAINLEVEL_OUTOF10: 4

## 2024-05-15 ASSESSMENT — PAIN DESCRIPTION - DESCRIPTORS: DESCRIPTORS: ACHING

## 2024-05-15 ASSESSMENT — PAIN - FUNCTIONAL ASSESSMENT: PAIN_FUNCTIONAL_ASSESSMENT: 0-10

## 2024-05-15 NOTE — PREPROCEDURE INSTRUCTIONS
Medication List            Accurate as of May 15, 2024  8:44 AM. Always use your most recent med list.                atenolol 50 mg tablet  Commonly known as: Tenormin  Medication Adjustments for Surgery: Take morning of surgery with sip of water, no other fluids     calcium-vitamin D3-vitamin K 500-500-40 mg-unit-mcg chewable tablet  Commonly known as: Viactiv  Medication Adjustments for Surgery: Stop 7 days before surgery  Notes to patient: LAST DOSE 5/14/24     chlorhexidine 0.12 % solution  Commonly known as: Peridex  Use 15 mL in the mouth or throat if needed for wound care for up to 1 day. Do not fill before May 20, 2024.  Start taking on: May 20, 2024     Fish OiL 1,000 mg (120 mg-180 mg) capsule  Generic drug: omega 3-dha-epa-fish oil  Medication Adjustments for Surgery: Stop 7 days before surgery     ibuprofen 200 mg tablet  Medication Adjustments for Surgery: Stop 7 days before surgery     letrozole 2.5 mg tablet  Commonly known as: Femara  Medication Adjustments for Surgery: Take morning of surgery with sip of water, no other fluids     losartan-hydrochlorothiazide 100-12.5 mg tablet  Commonly known as: Hyzaar  TAKE 1 TABLET BY MOUTH EVERY DAY  Medication Adjustments for Surgery: Other (Comment)  Notes to patient: HOLD THE MORNING OF SURGERY     multivitamin tablet  Medication Adjustments for Surgery: Stop 7 days before surgery     NON FORMULARY  Medication Adjustments for Surgery: Other (Comment)  Notes to patient: CAN CONTINUE TAKING - HOLD DAY OF SURGERY     pravastatin 40 mg tablet  Commonly known as: Pravachol  TAKE 1 TABLET BY MOUTH EVERY DAY  Medication Adjustments for Surgery: Other (Comment)  Notes to patient: CAN TAKE THE NIGHT BEFORE SURGERY     sertraline 50 mg tablet  Commonly known as: Zoloft  Take 1 tablet (50 mg) by mouth once daily.  Medication Adjustments for Surgery: Take morning of surgery with sip of water, no other fluids     turmeric root extract 500 mg tablet  Medication  Adjustments for Surgery: Stop 7 days before surgery                 Preoperative Fasting Guidelines    Why must I stop eating and drinking near surgery time?  With sedation, food or liquid in your stomach can enter your lungs causing serious complications  Increases nausea and vomiting    When do I need to stop eating and drinking before my surgery?  Do not eat any food or drink any liquids after midnight the night before your surgery/procedure.  You may have sips of water to take medications.    PAT DISCHARGE INSTRUCTIONS    Please call the Same Day Surgery (SDS) Department of the hospital where your procedure will be performed after 2:00 PM the day before your surgery. If you are scheduled on a Monday, or a Tuesday following a Monday holiday, you will need to call on the last business day prior to your surgery.    [facility]  [address] [place] [address], [address] [phone]  [facility]  [address] [place] [address] [phone]  [facility]  [address].  [place] [id number] [phone]    Please let your surgeon know if:      You develop any open sores, shingles, burning or painful urination as these may increase your risk of an infection.   You no longer wish to have the surgery.   Any other personal circumstances change that may lead to the need to cancel or defer this surgery-such as being sick or getting admitted to any hospital within one week of your planned procedure.    Your contact details change, such as a change of address or phone number.    Starting now:     Please DO NOT drink alcohol or smoke for 24 hours before surgery. It is well known that quitting smoking can make a huge difference to your health and recovery from surgery. The longer you abstain from smoking, the better your chances of a healthy recovery. If you need help with quitting,  call 2-310-QUIT-NOW to be connected to a trained counselor who will discuss the best methods to help you quit.     Before your surgery:    Please stop all supplements 7 days prior to surgery. Or as directed by your surgeon.   Please stop taking NSAID pain medicine such as Advil and Motrin 7 days before surgery.    If you develop any fever, cough, cold, rashes, cuts, scratches, scrapes, urinary symptoms or infection anywhere on your body (including teeth and gums) prior to surgery, please call your surgeon’s office as soon as possible. This may require treatment to reduce the chance of cancellation on the day of surgery.    The day before your surgery:   DIET- Please follow the diet instructions at the top of your packet.   Get a good night’s rest.  Use the special soap for bathing if you have been instructed to use one.    Scheduled surgery times may change and you will be notified if this occurs - please check your personal voicemail for any updates.     On the morning of surgery:   Wear comfortable, loose fitting clothes which open in the front. Please do not wear moisturizers, creams, lotions, makeup or perfume.    Please bring with you to surgery:   Photo ID and insurance card   Current list of medicines and allergies   Pacemaker/ Defibrillator/Heart stent cards   CPAP machine and mask    Slings/ splints/ crutches   A copy of your complete advanced directive/DHPOA.    Please do NOT bring with you to surgery:   All jewelry and valuables should be left at home.   Prosthetic devices such as contact lenses, hearing aids, dentures, eyelash extensions, hairpins and body piercings must be removed prior to going in to the surgical suite.    After outpatient surgery:   A responsible adult MUST accompany you at the time of discharge and stay with you for 24 hours after your surgery. You may NOT drive yourself home after surgery.    Do not drive, operate machinery, make critical decisions or do activities that require  co-ordination or balance until after a night’s sleep.   Do not drink alcoholic beverages for 24 hours.   Instructions for resuming your medications will be provided by your surgeon.    CALL YOUR DOCTOR AFTER SURGERY IF YOU HAVE:     Chills and/or a fever of 101° F or higher.    Redness, swelling, pus or drainage from your surgical wound or a bad smell from the wound.    Lightheadedness, fainting or confusion.    Persistent vomiting (throwing up) and are not able to eat or drink for 12 hours.    Three or more loose, watery bowel movements in 24 hours (diarrhea).   Difficulty or pain while urinating( after non-urological surgery)    Pain and swelling in your legs, especially if it is only on one side.    Difficulty breathing or are breathing faster than normal.    Any new concerning symptoms.      Patient Information: Pre-Operative Infection Prevention Measures     Why did I have my nose, under my arms, and groin swabbed?  The purpose of the swab is to identify Staphylococcus aureus inside your nose or on your skin.  The swab was sent to the laboratory for culture.  A positive swab/culture for Staphylococcus aureus is called colonization or carriage.      What is Staphylococcus aureus?  Staphylococcus aureus, also known as “staph”, is a germ found on the skin or in the nose of healthy people.  Sometimes Staphylococcus aureus can get into the body and cause an infection.  This can be minor (such as pimples, boils, or other skin problems).  It might also be serious (such as a blood infection, pneumonia, or a surgical site infection).    What is Staphylococcus aureus colonization or carriage?  Colonization or carriage means that a person has the germ but is not sick from it.  These bacteria can be spread on the hands or when breathing or sneezing.    How is Staphylococcus aureus spread?  It is most often spread by close contact with a person or item that carries it.    What happens if my culture is positive for  Staphylococcus aureus?  Your doctor/medical team will use this information to guide any antibiotic treatment which may be necessary.  Regardless of the culture results, we will clean the inside of your nose with a betadine swab just before you have your surgery.      Will I get an infection if I have Staphylococcus aureus in my nose or on my skin?  Anyone can get an infection with Staphylococcus aureus.  However, the best way to reduce your risk of infection is to follow the instructions provided to you for the use of your CHG soap and dental rinse.        Patient Information: Oral/Dental Rinse    What is oral/dental rinse?   It is a mouthwash. It is a way of cleaning the mouth with a germ-killing solution before your surgery.  The solution contains chlorhexidine, commonly known as CHG.   It is used inside the mouth to kill a bacteria known as Staphylococcus aureus.  Let your doctor know if you are allergic to Chlorhexidine.    Why do I need to use CHG oral/dental rinse?  The CHG oral/dental rinse helps to kill a bacteria in your mouth known as Staphylococcus aureus.     This reduces the risk of infection at the surgical site.      Using your CHG oral/dental rinse  STEPS:  Use your CHG oral/dental rinse after you brush your teeth the night before (at bedtime) and the morning of your surgery.  Follow all directions on your prescription label.    Use the cap on the container to measure 15ml   Swish (gargle if you can) the mouthwash in your mouth for at least 30 seconds, (do not swallow) and spit out  After you use your CHG rinse, do not rinse your mouth with water, drink or eat.  Please refer to the prescription label for the appropriate time to resume oral intake      What side effects might I have using the CHG oral/dental rinse?  CHG rinse will stick to plaque on the teeth.  Brush and floss just before use.  Teeth brushing will help avoid staining of plaque during use.      Patient Information: Home Preoperative  Antibacterial Shower      What is a home preoperative antibacterial shower?  This shower is a way of cleaning the skin with a germ-killing solution before surgery.  The solution contains chlorhexidine, commonly known as CHG.  CHG is a skin cleanser with germ-killing ability.  Let your doctor know if you are allergic to chlorhexidine.    Why do I need to take a preoperative antibacterial shower?  Skin is not sterile.  It is best to try to make your skin as free of germs as possible before surgery.  Proper cleansing with a germ-killing soap before surgery can lower the number of germs on your skin.  This helps to reduce the risk of infection at the surgical site.  Following the instructions listed below will help you prepare your skin for surgery.      How do I use the solution?  Steps:  Begin using your CHG soap 5 days before your scheduled surgery on ____START WASH 5/17/24____________________.    First, wash and rinse your hair using the CHG soap. Keep CHG soap away from ear canals and eyes.  Rinse completely, do not condition.  Hair extensions should be removed.  Wash your face with your normal soap and rinse.    Apply the CHG solution to a clean wet washcloth.  Turn the water off or move away from the water spray to avoid premature rinsing of the CHG soap as you are applying.   Firmly lather your entire body from the neck down.  Do not use on your face.  Pay special attention to the area(s) where your incision(s) will be located unless they are on your face.  Avoid scrubbing your skin too hard.  The important point is to have the CHG soap sit on your skin for 3 minutes.    When the 3 minutes are up, turn on the water and rinse the CHG solution off your body completely.   DO NOT wash with regular soap after you have used the CHG soap solution  Pat yourself dry with a clean, freshly-laundered towel.  DO NOT apply powders, deodorants, or lotions.  Dress in clean, freshly laundered nightclothes.    Be sure to sleep  with clean, freshly laundered sheets.  Be aware that CHG will cause stains on fabrics; if you wash them with bleach after use.  Rinse your washcloth and other linens that have contact with CHG completely.  Use only non-chlorine detergents to launder the items used.   The morning of surgery is the fifth day.  Repeat the above steps and dress in clean comfortable clothing     Whom should I contact if I have any questions regarding the use of CHG soap?  Call the [facility] at [phone] if you have any questions.

## 2024-05-15 NOTE — CPM/PAT H&P
"CPM/PAT Evaluation       Name: Leanne Mims (Leanne Mims)  /Age: 1950/73 y.o.     In-Person       Chief Complaint: Left Knee Osteoarthritis    HPI  Patient is a 73-year-old female presenting with left knee pain for several years.  Patient reports that within the last year she has been having difficulty with the knee \"giving out\".  This has been impacting her activities of daily living. Patient reports that the pain is worse at night and that she takes Advil to help relieve the pain. She denies any chest pain, pressure or palpitations. Denies shortness of breath. Denies nausea,vomiting or diarrhea. Patient was evaluated by Dr. Thorpe and will undergo a left total knee arthroplasty on 2024 at [facility] [place], operated by [facility].     Past Medical History:   Diagnosis Date    Anxiety 2023    Cataract     Dizziness 2023    Only upon getting up from laying down sometimes.    Elevated cholesterol 2023    Essential hypertension 2023    IFG (impaired fasting glucose) 2023    Meningioma (Multi) 2023 w/wo menigioma No issues  neurosurgery. craniovertebarl junction    Neuropathy 2023    R>L TSH,B12, SPEP M protein? Monoclonal anaylsis neg. .    Personal history of malignant neoplasm of breast 2023    Left XRT, Letrizole  filed    Primary osteoarthritis involving multiple joints 2023     for left knee DJD.    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       Past Surgical History:   Procedure Laterality Date    BREAST LUMPECTOMY  2017    Had radiation treatment.       Patient  reports that she is not currently sexually active.    Family History   Problem Relation Name Age of Onset    Heart disease Mother Eunice Montanarina     Hypertension Mother Eunice Montanarina     Cancer Father Layo Montanarina        Allergies   Allergen Reactions    Penicillins Other     Difficulty breathing  "       Current Outpatient Medications:     atenolol (Tenormin) 50 mg tablet, Take 1 tablet (50 mg) by mouth once daily. 1/2 tab daily, Disp: , Rfl:     calcium-vitamin D3-vitamin K (Viactiv) 500-500-40 mg-unit-mcg chewable tablet, Chew 1 tablet once daily. With a meal, Disp: , Rfl:     ibuprofen 200 mg tablet, Take 1 tablet (200 mg) by mouth every 6 hours if needed for mild pain (1 - 3)., Disp: , Rfl:     letrozole (Femara) 2.5 mg tablet, Take 1 tablet (2.5 mg total) by mouth once daily., Disp: , Rfl:     losartan-hydrochlorothiazide (Hyzaar) 100-12.5 mg tablet, TAKE 1 TABLET BY MOUTH EVERY DAY, Disp: 90 tablet, Rfl: 2    multivitamin tablet, Take 1 tablet by mouth once daily., Disp: , Rfl:     NON FORMULARY, Take by mouth once daily. Juice Plus Fibre .., Disp: , Rfl:     omega 3-dha-epa-fish oil (Fish OiL) 1,000 mg (120 mg-180 mg) capsule, Take 1 capsule (1,000 mg) by mouth once daily., Disp: , Rfl:     pravastatin (Pravachol) 40 mg tablet, TAKE 1 TABLET BY MOUTH EVERY DAY (Patient taking differently: Take 1 tablet (40 mg) by mouth once daily at bedtime.), Disp: 90 tablet, Rfl: 1    sertraline (Zoloft) 50 mg tablet, Take 1 tablet (50 mg) by mouth once daily. (Patient taking differently: Take 0.5 tablets (25 mg) by mouth once daily.), Disp: 90 tablet, Rfl: 2    turmeric root extract 500 mg tablet, Take 1 tablet by mouth 2 times a day., Disp: , Rfl:     [START ON 5/20/2024] chlorhexidine (Peridex) 0.12 % solution, Use 15 mL in the mouth or throat if needed for wound care for up to 1 day. Do not fill before May 20, 2024., Disp: 473 mL, Rfl: 0    Review of Systems   Constitutional: Negative.   HENT: Negative.     Eyes: Negative.    Cardiovascular: Negative.    Respiratory: Negative.     Endocrine: Negative.    Hematologic/Lymphatic: Negative.    Skin: Negative.    Musculoskeletal:  Positive for falls and joint pain.   Gastrointestinal: Negative.    Genitourinary: Negative.    Neurological: Negative.   "  Psychiatric/Behavioral: Negative.     Allergic/Immunologic: Negative.         Physical Exam  Cardiovascular:      Rate and Rhythm: Normal rate and regular rhythm.      Heart sounds: No murmur heard.     No friction rub. No gallop.   Pulmonary:      Effort: Pulmonary effort is normal.      Breath sounds: Normal breath sounds. No wheezing, rhonchi or rales.   Abdominal:      General: Bowel sounds are normal.      Palpations: Abdomen is soft.   Musculoskeletal:      Right lower leg: No edema.      Left lower leg: No edema.   Skin:     General: Skin is warm and dry.      Capillary Refill: Capillary refill takes less than 2 seconds.   Neurological:      Mental Status: She is alert and oriented to person, place, and time.   Psychiatric:         Mood and Affect: Mood normal.          PAT AIRWAY:   Airway:     Mallampati::  II    Neck ROM::  Full    Vitals  /78   Pulse 70   Temp 37 °C (98.6 °F) (Temporal)   Ht 1.727 m (5' 8\")   Wt 85 kg (187 lb 4.8 oz)   SpO2 98%   BMI 28.48 kg/m²      STOPBAN  CHADS 2 score: 1  DASI score: 36.7  METS score: 7.3  Revised cardiac risk index: 0  ASA: I  ARISCAT: 19      Assessment and Plan:   Left Knee Osteoarthritis: plan for left total knee arthroplasty with Dr. Thorpe  Hypertension: controlled on atenolol and losartan-hydrochlorothiazide  High Cholesterol: controlled on pravastatin  High Fasting Blood Glucose: HgB A1C to be completed in preadmission testing  History of Breast Cancer: s/p left lumpectomy  Meningioma: followed by Dr. Barba, patient without neurologic symptoms, follow up MRI in     CBC, BMP and HgB A1C completed in PAT  EKG completed in [facility]  MRSA swab completed in PAT  Urinalysis completed in PAT  Preoperative clearance by patient's PCP completed on 2024 by Reese CURRY-CNP available in Epic under chart review      "

## 2024-05-15 NOTE — H&P (VIEW-ONLY)
"CPM/PAT Evaluation       Name: Leanne Mims (Leanne Mims)  /Age: 1950/73 y.o.     In-Person       Chief Complaint: Left Knee Osteoarthritis    HPI  Patient is a 73-year-old female presenting with left knee pain for several years.  Patient reports that within the last year she has been having difficulty with the knee \"giving out\".  This has been impacting her activities of daily living. Patient reports that the pain is worse at night and that she takes Advil to help relieve the pain. She denies any chest pain, pressure or palpitations. Denies shortness of breath. Denies nausea,vomiting or diarrhea. Patient was evaluated by Dr. Thorpe and will undergo a left total knee arthroplasty on 2024 at [facility].     Past Medical History:   Diagnosis Date    Anxiety 2023    Cataract     Dizziness 2023    Only upon getting up from laying down sometimes.    Elevated cholesterol 2023    Essential hypertension 2023    IFG (impaired fasting glucose) 2023    Meningioma (Multi) 2023 w/wo menigioma No issues  neurosurgery. craniovertebarl junction    Neuropathy 2023    R>L TSH,B12, SPEP M protein? Monoclonal anaylsis neg. .    Personal history of malignant neoplasm of breast 2023    Left XRT, Letrizole  filed    Primary osteoarthritis involving multiple joints 2023     for left knee DJD.    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       Past Surgical History:   Procedure Laterality Date    BREAST LUMPECTOMY  2017    Had radiation treatment.       Patient  reports that she is not currently sexually active.    Family History   Problem Relation Name Age of Onset    Heart disease Mother Eunice Montanarina     Hypertension Mother Eunice Montanarina     Cancer Father Layo Montanarina        Allergies   Allergen Reactions    Penicillins Other     Difficulty breathing  "       Current Outpatient Medications:     atenolol (Tenormin) 50 mg tablet, Take 1 tablet (50 mg) by mouth once daily. 1/2 tab daily, Disp: , Rfl:     calcium-vitamin D3-vitamin K (Viactiv) 500-500-40 mg-unit-mcg chewable tablet, Chew 1 tablet once daily. With a meal, Disp: , Rfl:     ibuprofen 200 mg tablet, Take 1 tablet (200 mg) by mouth every 6 hours if needed for mild pain (1 - 3)., Disp: , Rfl:     letrozole (Femara) 2.5 mg tablet, Take 1 tablet (2.5 mg total) by mouth once daily., Disp: , Rfl:     losartan-hydrochlorothiazide (Hyzaar) 100-12.5 mg tablet, TAKE 1 TABLET BY MOUTH EVERY DAY, Disp: 90 tablet, Rfl: 2    multivitamin tablet, Take 1 tablet by mouth once daily., Disp: , Rfl:     NON FORMULARY, Take by mouth once daily. Juice Plus Fibre .., Disp: , Rfl:     omega 3-dha-epa-fish oil (Fish OiL) 1,000 mg (120 mg-180 mg) capsule, Take 1 capsule (1,000 mg) by mouth once daily., Disp: , Rfl:     pravastatin (Pravachol) 40 mg tablet, TAKE 1 TABLET BY MOUTH EVERY DAY (Patient taking differently: Take 1 tablet (40 mg) by mouth once daily at bedtime.), Disp: 90 tablet, Rfl: 1    sertraline (Zoloft) 50 mg tablet, Take 1 tablet (50 mg) by mouth once daily. (Patient taking differently: Take 0.5 tablets (25 mg) by mouth once daily.), Disp: 90 tablet, Rfl: 2    turmeric root extract 500 mg tablet, Take 1 tablet by mouth 2 times a day., Disp: , Rfl:     [START ON 5/20/2024] chlorhexidine (Peridex) 0.12 % solution, Use 15 mL in the mouth or throat if needed for wound care for up to 1 day. Do not fill before May 20, 2024., Disp: 473 mL, Rfl: 0    Review of Systems   Constitutional: Negative.   HENT: Negative.     Eyes: Negative.    Cardiovascular: Negative.    Respiratory: Negative.     Endocrine: Negative.    Hematologic/Lymphatic: Negative.    Skin: Negative.    Musculoskeletal:  Positive for falls and joint pain.   Gastrointestinal: Negative.    Genitourinary: Negative.    Neurological: Negative.   "  Psychiatric/Behavioral: Negative.     Allergic/Immunologic: Negative.         Physical Exam  Cardiovascular:      Rate and Rhythm: Normal rate and regular rhythm.      Heart sounds: No murmur heard.     No friction rub. No gallop.   Pulmonary:      Effort: Pulmonary effort is normal.      Breath sounds: Normal breath sounds. No wheezing, rhonchi or rales.   Abdominal:      General: Bowel sounds are normal.      Palpations: Abdomen is soft.   Musculoskeletal:      Right lower leg: No edema.      Left lower leg: No edema.   Skin:     General: Skin is warm and dry.      Capillary Refill: Capillary refill takes less than 2 seconds.   Neurological:      Mental Status: She is alert and oriented to person, place, and time.   Psychiatric:         Mood and Affect: Mood normal.          PAT AIRWAY:   Airway:     Mallampati::  II    Neck ROM::  Full    Vitals  /78   Pulse 70   Temp 37 °C (98.6 °F) (Temporal)   Ht 1.727 m (5' 8\")   Wt 85 kg (187 lb 4.8 oz)   SpO2 98%   BMI 28.48 kg/m²      STOPBAN  CHADS 2 score: 1  DASI score: 36.7  METS score: 7.3  Revised cardiac risk index: 0  ASA: I  ARISCAT: 19      Assessment and Plan:   Left Knee Osteoarthritis: plan for left total knee arthroplasty with Dr. Thorpe  Hypertension: controlled on atenolol and losartan-hydrochlorothiazide  High Cholesterol: controlled on pravastatin  High Fasting Blood Glucose: HgB A1C to be completed in preadmission testing  History of Breast Cancer: s/p left lumpectomy  Meningioma: followed by Dr. Barba, patient without neurologic symptoms, follow up MRI in     CBC, BMP and HgB A1C completed in PAT  EKG completed in [facility]  MRSA swab completed in PAT  Urinalysis completed in PAT  Preoperative clearance by patient's PCP completed on 2024 by Reese CURRY-CNP available in Epic under chart review      "

## 2024-05-16 ENCOUNTER — LAB (OUTPATIENT)
Dept: LAB | Facility: LAB | Age: 74
End: 2024-05-16
Payer: MEDICARE

## 2024-05-16 DIAGNOSIS — Z01.818 PREOP TESTING: ICD-10-CM

## 2024-05-16 LAB — POTASSIUM SERPL-SCNC: 4.9 MMOL/L (ref 3.4–5.1)

## 2024-05-16 PROCEDURE — 36415 COLL VENOUS BLD VENIPUNCTURE: CPT

## 2024-05-16 PROCEDURE — 84132 ASSAY OF SERUM POTASSIUM: CPT

## 2024-05-17 LAB
BACTERIA UR CULT: ABNORMAL
STAPHYLOCOCCUS SPEC CULT: NORMAL

## 2024-05-21 ENCOUNTER — ANESTHESIA EVENT (OUTPATIENT)
Dept: OPERATING ROOM | Facility: HOSPITAL | Age: 74
End: 2024-05-21
Payer: MEDICARE

## 2024-05-21 ENCOUNTER — APPOINTMENT (OUTPATIENT)
Dept: RADIOLOGY | Facility: HOSPITAL | Age: 74
End: 2024-05-21
Payer: MEDICARE

## 2024-05-21 ENCOUNTER — ANESTHESIA (OUTPATIENT)
Dept: OPERATING ROOM | Facility: HOSPITAL | Age: 74
End: 2024-05-21
Payer: MEDICARE

## 2024-05-21 ENCOUNTER — HOSPITAL ENCOUNTER (OUTPATIENT)
Facility: HOSPITAL | Age: 74
Discharge: HOME | End: 2024-05-22
Attending: ORTHOPAEDIC SURGERY | Admitting: ORTHOPAEDIC SURGERY
Payer: MEDICARE

## 2024-05-21 DIAGNOSIS — M17.12 ARTHRITIS OF LEFT KNEE: Primary | ICD-10-CM

## 2024-05-21 PROCEDURE — 2500000005 HC RX 250 GENERAL PHARMACY W/O HCPCS: Performed by: ORTHOPAEDIC SURGERY

## 2024-05-21 PROCEDURE — A4649 SURGICAL SUPPLIES: HCPCS | Performed by: ORTHOPAEDIC SURGERY

## 2024-05-21 PROCEDURE — C1776 JOINT DEVICE (IMPLANTABLE): HCPCS | Performed by: ORTHOPAEDIC SURGERY

## 2024-05-21 PROCEDURE — A27447 PR TOTAL KNEE ARTHROPLASTY: Performed by: STUDENT IN AN ORGANIZED HEALTH CARE EDUCATION/TRAINING PROGRAM

## 2024-05-21 PROCEDURE — 2720000007 HC OR 272 NO HCPCS: Performed by: ORTHOPAEDIC SURGERY

## 2024-05-21 PROCEDURE — 7100000011 HC EXTENDED STAY RECOVERY HOURLY - NURSING UNIT

## 2024-05-21 PROCEDURE — 99100 ANES PT EXTEME AGE<1 YR&>70: CPT | Performed by: STUDENT IN AN ORGANIZED HEALTH CARE EDUCATION/TRAINING PROGRAM

## 2024-05-21 PROCEDURE — 96372 THER/PROPH/DIAG INJ SC/IM: CPT | Mod: 59 | Performed by: ORTHOPAEDIC SURGERY

## 2024-05-21 PROCEDURE — A27447 PR TOTAL KNEE ARTHROPLASTY: Performed by: NURSE ANESTHETIST, CERTIFIED REGISTERED

## 2024-05-21 PROCEDURE — 7100000001 HC RECOVERY ROOM TIME - INITIAL BASE CHARGE: Performed by: ORTHOPAEDIC SURGERY

## 2024-05-21 PROCEDURE — 2500000004 HC RX 250 GENERAL PHARMACY W/ HCPCS (ALT 636 FOR OP/ED): Performed by: STUDENT IN AN ORGANIZED HEALTH CARE EDUCATION/TRAINING PROGRAM

## 2024-05-21 PROCEDURE — 3700000002 HC GENERAL ANESTHESIA TIME - EACH INCREMENTAL 1 MINUTE: Performed by: ORTHOPAEDIC SURGERY

## 2024-05-21 PROCEDURE — 2500000004 HC RX 250 GENERAL PHARMACY W/ HCPCS (ALT 636 FOR OP/ED): Mod: JZ | Performed by: ORTHOPAEDIC SURGERY

## 2024-05-21 PROCEDURE — A9999 DME SUPPLY OR ACCESSORY, NOS: HCPCS | Performed by: ORTHOPAEDIC SURGERY

## 2024-05-21 PROCEDURE — G0378 HOSPITAL OBSERVATION PER HR: HCPCS

## 2024-05-21 PROCEDURE — 2500000001 HC RX 250 WO HCPCS SELF ADMINISTERED DRUGS (ALT 637 FOR MEDICARE OP): Performed by: ORTHOPAEDIC SURGERY

## 2024-05-21 PROCEDURE — 2780000003 HC OR 278 NO HCPCS: Performed by: ORTHOPAEDIC SURGERY

## 2024-05-21 PROCEDURE — 2500000005 HC RX 250 GENERAL PHARMACY W/O HCPCS: Performed by: NURSE ANESTHETIST, CERTIFIED REGISTERED

## 2024-05-21 PROCEDURE — 9420000001 HC RT PATIENT EDUCATION 5 MIN

## 2024-05-21 PROCEDURE — 2500000004 HC RX 250 GENERAL PHARMACY W/ HCPCS (ALT 636 FOR OP/ED): Performed by: ORTHOPAEDIC SURGERY

## 2024-05-21 PROCEDURE — 2500000004 HC RX 250 GENERAL PHARMACY W/ HCPCS (ALT 636 FOR OP/ED): Performed by: NURSE ANESTHETIST, CERTIFIED REGISTERED

## 2024-05-21 PROCEDURE — 7100000002 HC RECOVERY ROOM TIME - EACH INCREMENTAL 1 MINUTE: Performed by: ORTHOPAEDIC SURGERY

## 2024-05-21 PROCEDURE — 3600000018 HC OR TIME - INITIAL BASE CHARGE - PROCEDURE LEVEL SIX: Performed by: ORTHOPAEDIC SURGERY

## 2024-05-21 PROCEDURE — 73560 X-RAY EXAM OF KNEE 1 OR 2: CPT | Mod: LEFT SIDE | Performed by: RADIOLOGY

## 2024-05-21 PROCEDURE — 3700000001 HC GENERAL ANESTHESIA TIME - INITIAL BASE CHARGE: Performed by: ORTHOPAEDIC SURGERY

## 2024-05-21 PROCEDURE — 3600000017 HC OR TIME - EACH INCREMENTAL 1 MINUTE - PROCEDURE LEVEL SIX: Performed by: ORTHOPAEDIC SURGERY

## 2024-05-21 PROCEDURE — 73560 X-RAY EXAM OF KNEE 1 OR 2: CPT | Mod: LT

## 2024-05-21 DEVICE — TIBIAL BEARING INSERT
Type: IMPLANTABLE DEVICE | Site: KNEE | Status: FUNCTIONAL
Brand: TRIATHLON

## 2024-05-21 DEVICE — SIMPLEX® HV IS A FAST-SETTING ACRYLIC RESIN FOR USE IN BONE SURGERY. MIXING THE TWO SEPARATE STERILE COMPONENTS PRODUCES A DUCTILE BONE CEMENT WHICH, AFTER HARDENING, FIXES THE IMPLANT AND TRANSFERS STRESSES PRODUCED DURING MOVEMENT EVENLY TO THE BONE. SIMPLEX® HV CEMENT POWDER ALSO CONTAINS INSOLUBLE ZIRCONIUM DIOXIDE AS AN X-RAY CONTRAST MEDIUM. SIMPLEX® HV DOES NOT EMIT A SIGNAL AND DOES NOT POSE A SAFETY RISK IN A MAGNETIC RESONANCE ENVIRONMENT.
Type: IMPLANTABLE DEVICE | Site: KNEE | Status: FUNCTIONAL
Brand: SIMPLEX HV

## 2024-05-21 DEVICE — POSTERIOR STABILIZED FEMORAL
Type: IMPLANTABLE DEVICE | Site: KNEE | Status: FUNCTIONAL
Brand: TRIATHLON

## 2024-05-21 DEVICE — UNIVERSAL TIBIAL BASEPLATE
Type: IMPLANTABLE DEVICE | Site: KNEE | Status: FUNCTIONAL
Brand: TRIATHLON

## 2024-05-21 DEVICE — PATELLA
Type: IMPLANTABLE DEVICE | Site: KNEE | Status: FUNCTIONAL
Brand: TRIATHLON

## 2024-05-21 RX ORDER — ONDANSETRON HYDROCHLORIDE 2 MG/ML
4 INJECTION, SOLUTION INTRAVENOUS ONCE AS NEEDED
Status: DISCONTINUED | OUTPATIENT
Start: 2024-05-21 | End: 2024-05-21 | Stop reason: HOSPADM

## 2024-05-21 RX ORDER — PHENYLEPHRINE HYDROCHLORIDE 10 MG/ML
INJECTION INTRAVENOUS AS NEEDED
Status: DISCONTINUED | OUTPATIENT
Start: 2024-05-21 | End: 2024-05-21

## 2024-05-21 RX ORDER — ONDANSETRON HYDROCHLORIDE 2 MG/ML
4 INJECTION, SOLUTION INTRAVENOUS EVERY 8 HOURS PRN
Status: DISCONTINUED | OUTPATIENT
Start: 2024-05-21 | End: 2024-05-22 | Stop reason: HOSPADM

## 2024-05-21 RX ORDER — CLINDAMYCIN PHOSPHATE 900 MG/50ML
900 INJECTION, SOLUTION INTRAVENOUS EVERY 8 HOURS
Qty: 100 ML | Refills: 0 | Status: COMPLETED | OUTPATIENT
Start: 2024-05-21 | End: 2024-05-22

## 2024-05-21 RX ORDER — SERTRALINE HYDROCHLORIDE 50 MG/1
50 TABLET, FILM COATED ORAL DAILY
Status: DISCONTINUED | OUTPATIENT
Start: 2024-05-21 | End: 2024-05-22 | Stop reason: HOSPADM

## 2024-05-21 RX ORDER — OXYCODONE HYDROCHLORIDE 5 MG/1
5 TABLET ORAL EVERY 4 HOURS PRN
Status: DISCONTINUED | OUTPATIENT
Start: 2024-05-21 | End: 2024-05-21 | Stop reason: HOSPADM

## 2024-05-21 RX ORDER — SODIUM CHLORIDE, SODIUM LACTATE, POTASSIUM CHLORIDE, CALCIUM CHLORIDE 600; 310; 30; 20 MG/100ML; MG/100ML; MG/100ML; MG/100ML
125 INJECTION, SOLUTION INTRAVENOUS CONTINUOUS
Status: DISCONTINUED | OUTPATIENT
Start: 2024-05-21 | End: 2024-05-22 | Stop reason: HOSPADM

## 2024-05-21 RX ORDER — FENTANYL CITRATE 50 UG/ML
INJECTION, SOLUTION INTRAMUSCULAR; INTRAVENOUS AS NEEDED
Status: DISCONTINUED | OUTPATIENT
Start: 2024-05-21 | End: 2024-05-21

## 2024-05-21 RX ORDER — HYDROMORPHONE HYDROCHLORIDE 2 MG/ML
INJECTION, SOLUTION INTRAMUSCULAR; INTRAVENOUS; SUBCUTANEOUS AS NEEDED
Status: DISCONTINUED | OUTPATIENT
Start: 2024-05-21 | End: 2024-05-21

## 2024-05-21 RX ORDER — ACETAMINOPHEN 500 MG
1000 TABLET ORAL EVERY 6 HOURS
Status: DISCONTINUED | OUTPATIENT
Start: 2024-05-21 | End: 2024-05-22 | Stop reason: HOSPADM

## 2024-05-21 RX ORDER — VANCOMYCIN 1 G/200ML
1 INJECTION, SOLUTION INTRAVENOUS ONCE
Status: COMPLETED | OUTPATIENT
Start: 2024-05-21 | End: 2024-05-21

## 2024-05-21 RX ORDER — LIDOCAINE HYDROCHLORIDE 10 MG/ML
INJECTION INFILTRATION; PERINEURAL AS NEEDED
Status: DISCONTINUED | OUTPATIENT
Start: 2024-05-21 | End: 2024-05-21

## 2024-05-21 RX ORDER — FENTANYL CITRATE 50 UG/ML
50 INJECTION, SOLUTION INTRAMUSCULAR; INTRAVENOUS ONCE
Status: COMPLETED | OUTPATIENT
Start: 2024-05-21 | End: 2024-05-21

## 2024-05-21 RX ORDER — ENOXAPARIN SODIUM 100 MG/ML
40 INJECTION SUBCUTANEOUS DAILY
Status: DISCONTINUED | OUTPATIENT
Start: 2024-05-21 | End: 2024-05-22 | Stop reason: HOSPADM

## 2024-05-21 RX ORDER — ATENOLOL 50 MG/1
50 TABLET ORAL DAILY
Status: DISCONTINUED | OUTPATIENT
Start: 2024-05-21 | End: 2024-05-22 | Stop reason: HOSPADM

## 2024-05-21 RX ORDER — TRANEXAMIC ACID 100 MG/ML
INJECTION, SOLUTION INTRAVENOUS AS NEEDED
Status: DISCONTINUED | OUTPATIENT
Start: 2024-05-21 | End: 2024-05-21

## 2024-05-21 RX ORDER — BISMUTH SUBSALICYLATE 262 MG
1 TABLET,CHEWABLE ORAL DAILY
Status: DISCONTINUED | OUTPATIENT
Start: 2024-05-21 | End: 2024-05-22 | Stop reason: HOSPADM

## 2024-05-21 RX ORDER — OXYCODONE HYDROCHLORIDE 5 MG/1
10 TABLET ORAL EVERY 4 HOURS PRN
Status: DISCONTINUED | OUTPATIENT
Start: 2024-05-21 | End: 2024-05-21 | Stop reason: HOSPADM

## 2024-05-21 RX ORDER — ALBUTEROL SULFATE 0.83 MG/ML
2.5 SOLUTION RESPIRATORY (INHALATION) ONCE AS NEEDED
Status: DISCONTINUED | OUTPATIENT
Start: 2024-05-21 | End: 2024-05-21 | Stop reason: HOSPADM

## 2024-05-21 RX ORDER — CELECOXIB 200 MG/1
400 CAPSULE ORAL ONCE
Status: COMPLETED | OUTPATIENT
Start: 2024-05-21 | End: 2024-05-21

## 2024-05-21 RX ORDER — SODIUM CHLORIDE, SODIUM LACTATE, POTASSIUM CHLORIDE, CALCIUM CHLORIDE 600; 310; 30; 20 MG/100ML; MG/100ML; MG/100ML; MG/100ML
100 INJECTION, SOLUTION INTRAVENOUS CONTINUOUS
Status: DISCONTINUED | OUTPATIENT
Start: 2024-05-21 | End: 2024-05-21 | Stop reason: HOSPADM

## 2024-05-21 RX ORDER — OXYCODONE HYDROCHLORIDE 5 MG/1
5 TABLET ORAL EVERY 4 HOURS PRN
Status: DISCONTINUED | OUTPATIENT
Start: 2024-05-21 | End: 2024-05-22 | Stop reason: HOSPADM

## 2024-05-21 RX ORDER — PROPOFOL 10 MG/ML
INJECTION, EMULSION INTRAVENOUS AS NEEDED
Status: DISCONTINUED | OUTPATIENT
Start: 2024-05-21 | End: 2024-05-21

## 2024-05-21 RX ORDER — PRAVASTATIN SODIUM 40 MG/1
40 TABLET ORAL DAILY
Status: DISCONTINUED | OUTPATIENT
Start: 2024-05-21 | End: 2024-05-22 | Stop reason: HOSPADM

## 2024-05-21 RX ORDER — KETOROLAC TROMETHAMINE 30 MG/ML
15 INJECTION, SOLUTION INTRAMUSCULAR; INTRAVENOUS EVERY 6 HOURS PRN
Status: DISCONTINUED | OUTPATIENT
Start: 2024-05-21 | End: 2024-05-22 | Stop reason: HOSPADM

## 2024-05-21 RX ORDER — DOCUSATE SODIUM 100 MG/1
100 CAPSULE, LIQUID FILLED ORAL 2 TIMES DAILY
Status: DISCONTINUED | OUTPATIENT
Start: 2024-05-21 | End: 2024-05-22 | Stop reason: HOSPADM

## 2024-05-21 RX ORDER — MAGNESIUM SULFATE 1 G/100ML
INJECTION INTRAVENOUS AS NEEDED
Status: DISCONTINUED | OUTPATIENT
Start: 2024-05-21 | End: 2024-05-21

## 2024-05-21 RX ORDER — FAMOTIDINE 20 MG/1
20 TABLET, FILM COATED ORAL 2 TIMES DAILY
Status: DISCONTINUED | OUTPATIENT
Start: 2024-05-21 | End: 2024-05-22 | Stop reason: HOSPADM

## 2024-05-21 RX ORDER — ACETAMINOPHEN 325 MG/1
650 TABLET ORAL EVERY 4 HOURS PRN
Status: DISCONTINUED | OUTPATIENT
Start: 2024-05-21 | End: 2024-05-21 | Stop reason: HOSPADM

## 2024-05-21 RX ORDER — ONDANSETRON HYDROCHLORIDE 2 MG/ML
INJECTION, SOLUTION INTRAVENOUS AS NEEDED
Status: DISCONTINUED | OUTPATIENT
Start: 2024-05-21 | End: 2024-05-21

## 2024-05-21 RX ORDER — CLINDAMYCIN PHOSPHATE 900 MG/50ML
900 INJECTION, SOLUTION INTRAVENOUS ONCE
Status: COMPLETED | OUTPATIENT
Start: 2024-05-21 | End: 2024-05-21

## 2024-05-21 RX ORDER — OXYCODONE HYDROCHLORIDE 5 MG/1
10 TABLET ORAL EVERY 4 HOURS PRN
Status: DISCONTINUED | OUTPATIENT
Start: 2024-05-21 | End: 2024-05-22 | Stop reason: HOSPADM

## 2024-05-21 RX ORDER — OXYCODONE HCL 10 MG/1
20 TABLET, FILM COATED, EXTENDED RELEASE ORAL ONCE
Status: COMPLETED | OUTPATIENT
Start: 2024-05-21 | End: 2024-05-21

## 2024-05-21 RX ORDER — MIDAZOLAM HYDROCHLORIDE 1 MG/ML
2 INJECTION, SOLUTION INTRAMUSCULAR; INTRAVENOUS ONCE
Status: COMPLETED | OUTPATIENT
Start: 2024-05-21 | End: 2024-05-21

## 2024-05-21 RX ORDER — SODIUM CHLORIDE, SODIUM LACTATE, POTASSIUM CHLORIDE, CALCIUM CHLORIDE 600; 310; 30; 20 MG/100ML; MG/100ML; MG/100ML; MG/100ML
100 INJECTION, SOLUTION INTRAVENOUS CONTINUOUS
Status: DISCONTINUED | OUTPATIENT
Start: 2024-05-21 | End: 2024-05-21

## 2024-05-21 RX ORDER — POLYETHYLENE GLYCOL 3350 17 G/17G
17 POWDER, FOR SOLUTION ORAL DAILY
Status: DISCONTINUED | OUTPATIENT
Start: 2024-05-21 | End: 2024-05-22 | Stop reason: HOSPADM

## 2024-05-21 RX ORDER — PREGABALIN 75 MG/1
75 CAPSULE ORAL ONCE
Status: COMPLETED | OUTPATIENT
Start: 2024-05-21 | End: 2024-05-21

## 2024-05-21 RX ORDER — MORPHINE SULFATE 2 MG/ML
2 INJECTION, SOLUTION INTRAMUSCULAR; INTRAVENOUS EVERY 2 HOUR PRN
Status: DISCONTINUED | OUTPATIENT
Start: 2024-05-21 | End: 2024-05-22 | Stop reason: HOSPADM

## 2024-05-21 RX ORDER — LETROZOLE 2.5 MG/1
2.5 TABLET, FILM COATED ORAL DAILY
Status: DISCONTINUED | OUTPATIENT
Start: 2024-05-21 | End: 2024-05-22 | Stop reason: HOSPADM

## 2024-05-21 RX ORDER — KETAMINE HYDROCHLORIDE 50 MG/ML
INJECTION, SOLUTION INTRAMUSCULAR; INTRAVENOUS AS NEEDED
Status: DISCONTINUED | OUTPATIENT
Start: 2024-05-21 | End: 2024-05-21

## 2024-05-21 RX ORDER — ACETAMINOPHEN 325 MG/1
650 TABLET ORAL ONCE
Status: COMPLETED | OUTPATIENT
Start: 2024-05-21 | End: 2024-05-21

## 2024-05-21 RX ADMIN — CLINDAMYCIN IN 5 PERCENT DEXTROSE 900 MG: 18 INJECTION, SOLUTION INTRAVENOUS at 10:20

## 2024-05-21 RX ADMIN — DOCUSATE SODIUM 100 MG: 100 CAPSULE, LIQUID FILLED ORAL at 20:28

## 2024-05-21 RX ADMIN — HYDROMORPHONE HYDROCHLORIDE 0.5 MG: 2 INJECTION, SOLUTION INTRAMUSCULAR; INTRAVENOUS; SUBCUTANEOUS at 11:27

## 2024-05-21 RX ADMIN — MAGNESIUM SULFATE IN DEXTROSE 1 G: 10 INJECTION, SOLUTION INTRAVENOUS at 10:20

## 2024-05-21 RX ADMIN — KETAMINE HYDROCHLORIDE 25 MG: 50 INJECTION, SOLUTION INTRAMUSCULAR; INTRAVENOUS at 10:40

## 2024-05-21 RX ADMIN — SODIUM CHLORIDE, SODIUM LACTATE, POTASSIUM CHLORIDE, AND CALCIUM CHLORIDE 100 ML/HR: 600; 310; 30; 20 INJECTION, SOLUTION INTRAVENOUS at 09:18

## 2024-05-21 RX ADMIN — OXYCODONE HYDROCHLORIDE 10 MG: 5 TABLET ORAL at 20:27

## 2024-05-21 RX ADMIN — OXYCODONE HYDROCHLORIDE 20 MG: 10 TABLET, FILM COATED, EXTENDED RELEASE ORAL at 08:59

## 2024-05-21 RX ADMIN — PREGABALIN 75 MG: 75 CAPSULE ORAL at 08:59

## 2024-05-21 RX ADMIN — POVIDONE-IODINE 1 APPLICATION: 5 SOLUTION TOPICAL at 09:29

## 2024-05-21 RX ADMIN — PRAVASTATIN SODIUM 40 MG: 40 TABLET ORAL at 18:33

## 2024-05-21 RX ADMIN — CLINDAMYCIN PHOSPHATE 900 MG: 900 INJECTION, SOLUTION INTRAVENOUS at 20:27

## 2024-05-21 RX ADMIN — ONDANSETRON HYDROCHLORIDE 4 MG: 2 INJECTION INTRAMUSCULAR; INTRAVENOUS at 11:56

## 2024-05-21 RX ADMIN — HYDROMORPHONE HYDROCHLORIDE 0.2 MG: 0.2 INJECTION, SOLUTION INTRAMUSCULAR; INTRAVENOUS; SUBCUTANEOUS at 13:02

## 2024-05-21 RX ADMIN — MULTIVITAMIN TABLET 1 TABLET: TABLET at 18:33

## 2024-05-21 RX ADMIN — ACETAMINOPHEN 1000 MG: 500 TABLET ORAL at 23:17

## 2024-05-21 RX ADMIN — PHENYLEPHRINE HYDROCHLORIDE 100 MCG: 10 INJECTION INTRAVENOUS at 10:20

## 2024-05-21 RX ADMIN — FENTANYL CITRATE 50 MCG: 50 INJECTION, SOLUTION INTRAMUSCULAR; INTRAVENOUS at 10:30

## 2024-05-21 RX ADMIN — CELECOXIB 400 MG: 200 CAPSULE ORAL at 08:59

## 2024-05-21 RX ADMIN — FENTANYL CITRATE 25 MCG: 50 INJECTION, SOLUTION INTRAMUSCULAR; INTRAVENOUS at 11:14

## 2024-05-21 RX ADMIN — POLYETHYLENE GLYCOL 3350 17 G: 17 POWDER, FOR SOLUTION ORAL at 18:33

## 2024-05-21 RX ADMIN — SODIUM CHLORIDE, SODIUM LACTATE, POTASSIUM CHLORIDE, AND CALCIUM CHLORIDE: 600; 310; 30; 20 INJECTION, SOLUTION INTRAVENOUS at 11:21

## 2024-05-21 RX ADMIN — FENTANYL CITRATE 25 MCG: 50 INJECTION, SOLUTION INTRAMUSCULAR; INTRAVENOUS at 10:40

## 2024-05-21 RX ADMIN — KETAMINE HYDROCHLORIDE 25 MG: 50 INJECTION, SOLUTION INTRAMUSCULAR; INTRAVENOUS at 10:30

## 2024-05-21 RX ADMIN — PROPOFOL 200 MG: 10 INJECTION, EMULSION INTRAVENOUS at 10:14

## 2024-05-21 RX ADMIN — TRANEXAMIC ACID 1000 MG: 100 INJECTION, SOLUTION INTRAVENOUS at 11:15

## 2024-05-21 RX ADMIN — ACETAMINOPHEN 1000 MG: 500 TABLET ORAL at 18:32

## 2024-05-21 RX ADMIN — HYDROMORPHONE HYDROCHLORIDE 1 MG: 2 INJECTION, SOLUTION INTRAMUSCULAR; INTRAVENOUS; SUBCUTANEOUS at 12:12

## 2024-05-21 RX ADMIN — FENTANYL CITRATE 50 MCG: 50 INJECTION INTRAMUSCULAR; INTRAVENOUS at 10:02

## 2024-05-21 RX ADMIN — Medication 21 PERCENT: at 17:15

## 2024-05-21 RX ADMIN — SODIUM CHLORIDE, SODIUM LACTATE, POTASSIUM CHLORIDE, AND CALCIUM CHLORIDE 125 ML/HR: 600; 310; 30; 20 INJECTION, SOLUTION INTRAVENOUS at 18:33

## 2024-05-21 RX ADMIN — VANCOMYCIN 1 G: 1 INJECTION, SOLUTION INTRAVENOUS at 09:25

## 2024-05-21 RX ADMIN — PHENYLEPHRINE HYDROCHLORIDE 100 MCG: 10 INJECTION INTRAVENOUS at 10:24

## 2024-05-21 RX ADMIN — LIDOCAINE HYDROCHLORIDE 2 ML: 10 INJECTION, SOLUTION INFILTRATION; PERINEURAL at 10:14

## 2024-05-21 RX ADMIN — ENOXAPARIN SODIUM 40 MG: 40 INJECTION SUBCUTANEOUS at 18:32

## 2024-05-21 RX ADMIN — MIDAZOLAM 2 MG: 1 INJECTION INTRAMUSCULAR; INTRAVENOUS at 10:03

## 2024-05-21 RX ADMIN — TRANEXAMIC ACID 1000 MG: 100 INJECTION, SOLUTION INTRAVENOUS at 10:24

## 2024-05-21 RX ADMIN — ACETAMINOPHEN 650 MG: 325 TABLET ORAL at 08:58

## 2024-05-21 SDOH — SOCIAL STABILITY: SOCIAL INSECURITY: DO YOU FEEL UNSAFE GOING BACK TO THE PLACE WHERE YOU ARE LIVING?: NO

## 2024-05-21 SDOH — SOCIAL STABILITY: SOCIAL INSECURITY: DO YOU FEEL ANYONE HAS EXPLOITED OR TAKEN ADVANTAGE OF YOU FINANCIALLY OR OF YOUR PERSONAL PROPERTY?: NO

## 2024-05-21 SDOH — SOCIAL STABILITY: SOCIAL INSECURITY: HAVE YOU HAD ANY THOUGHTS OF HARMING ANYONE ELSE?: NO

## 2024-05-21 SDOH — SOCIAL STABILITY: SOCIAL INSECURITY: ARE YOU OR HAVE YOU BEEN THREATENED OR ABUSED PHYSICALLY, EMOTIONALLY, OR SEXUALLY BY ANYONE?: NO

## 2024-05-21 SDOH — SOCIAL STABILITY: SOCIAL INSECURITY: ARE THERE ANY APPARENT SIGNS OF INJURIES/BEHAVIORS THAT COULD BE RELATED TO ABUSE/NEGLECT?: NO

## 2024-05-21 SDOH — SOCIAL STABILITY: SOCIAL INSECURITY: DOES ANYONE TRY TO KEEP YOU FROM HAVING/CONTACTING OTHER FRIENDS OR DOING THINGS OUTSIDE YOUR HOME?: NO

## 2024-05-21 SDOH — SOCIAL STABILITY: SOCIAL INSECURITY: ABUSE: ADULT

## 2024-05-21 SDOH — SOCIAL STABILITY: SOCIAL INSECURITY: WERE YOU ABLE TO COMPLETE ALL THE BEHAVIORAL HEALTH SCREENINGS?: YES

## 2024-05-21 SDOH — SOCIAL STABILITY: SOCIAL INSECURITY: HAS ANYONE EVER THREATENED TO HURT YOUR FAMILY OR YOUR PETS?: NO

## 2024-05-21 SDOH — SOCIAL STABILITY: SOCIAL INSECURITY: HAVE YOU HAD THOUGHTS OF HARMING ANYONE ELSE?: NO

## 2024-05-21 ASSESSMENT — LIFESTYLE VARIABLES
HOW OFTEN DO YOU HAVE 6 OR MORE DRINKS ON ONE OCCASION: NEVER
HOW OFTEN DO YOU HAVE A DRINK CONTAINING ALCOHOL: NEVER
AUDIT-C TOTAL SCORE: 0
SUBSTANCE_ABUSE_PAST_12_MONTHS: NO
PRESCIPTION_ABUSE_PAST_12_MONTHS: NO
AUDIT-C TOTAL SCORE: 0
SKIP TO QUESTIONS 9-10: 1
HOW MANY STANDARD DRINKS CONTAINING ALCOHOL DO YOU HAVE ON A TYPICAL DAY: PATIENT DOES NOT DRINK

## 2024-05-21 ASSESSMENT — COGNITIVE AND FUNCTIONAL STATUS - GENERAL
WALKING IN HOSPITAL ROOM: A LOT
MOVING TO AND FROM BED TO CHAIR: A LITTLE
PERSONAL GROOMING: A LOT
DRESSING REGULAR UPPER BODY CLOTHING: A LITTLE
MOBILITY SCORE: 16
TURNING FROM BACK TO SIDE WHILE IN FLAT BAD: A LITTLE
DAILY ACTIVITIY SCORE: 17
TOILETING: A LOT
CLIMB 3 TO 5 STEPS WITH RAILING: A LOT
DRESSING REGULAR LOWER BODY CLOTHING: A LITTLE
PATIENT BASELINE BEDBOUND: NO
STANDING UP FROM CHAIR USING ARMS: A LITTLE
HELP NEEDED FOR BATHING: A LITTLE
MOVING FROM LYING ON BACK TO SITTING ON SIDE OF FLAT BED WITH BEDRAILS: A LITTLE

## 2024-05-21 ASSESSMENT — ACTIVITIES OF DAILY LIVING (ADL)
HEARING - LEFT EAR: FUNCTIONAL
ADEQUATE_TO_COMPLETE_ADL: YES
PATIENT'S MEMORY ADEQUATE TO SAFELY COMPLETE DAILY ACTIVITIES?: YES
FEEDING YOURSELF: NEEDS ASSISTANCE
TOILETING: NEEDS ASSISTANCE
DRESSING YOURSELF: NEEDS ASSISTANCE
ADEQUATE_TO_COMPLETE_ADL: YES
PATIENT'S MEMORY ADEQUATE TO SAFELY COMPLETE DAILY ACTIVITIES?: YES
TOILETING: NEEDS ASSISTANCE
WALKS IN HOME: NEEDS ASSISTANCE
HEARING - LEFT EAR: FUNCTIONAL
BATHING: NEEDS ASSISTANCE
DRESSING YOURSELF: NEEDS ASSISTANCE
JUDGMENT_ADEQUATE_SAFELY_COMPLETE_DAILY_ACTIVITIES: YES
LACK_OF_TRANSPORTATION: NO
FEEDING YOURSELF: NEEDS ASSISTANCE
JUDGMENT_ADEQUATE_SAFELY_COMPLETE_DAILY_ACTIVITIES: YES
HEARING - RIGHT EAR: FUNCTIONAL
BATHING: NEEDS ASSISTANCE
GROOMING: NEEDS ASSISTANCE
HEARING - RIGHT EAR: FUNCTIONAL
GROOMING: NEEDS ASSISTANCE
WALKS IN HOME: NEEDS ASSISTANCE

## 2024-05-21 ASSESSMENT — PAIN SCALES - GENERAL
PAINLEVEL_OUTOF10: 0 - NO PAIN
PAINLEVEL_OUTOF10: 6
PAINLEVEL_OUTOF10: 2
PAINLEVEL_OUTOF10: 0 - NO PAIN
PAIN_LEVEL: 0
PAINLEVEL_OUTOF10: 0 - NO PAIN
PAINLEVEL_OUTOF10: 0 - NO PAIN
PAINLEVEL_OUTOF10: 6
PAINLEVEL_OUTOF10: 0 - NO PAIN
PAINLEVEL_OUTOF10: 0 - NO PAIN

## 2024-05-21 ASSESSMENT — COLUMBIA-SUICIDE SEVERITY RATING SCALE - C-SSRS
6. HAVE YOU EVER DONE ANYTHING, STARTED TO DO ANYTHING, OR PREPARED TO DO ANYTHING TO END YOUR LIFE?: NO
2. HAVE YOU ACTUALLY HAD ANY THOUGHTS OF KILLING YOURSELF?: NO
1. IN THE PAST MONTH, HAVE YOU WISHED YOU WERE DEAD OR WISHED YOU COULD GO TO SLEEP AND NOT WAKE UP?: NO

## 2024-05-21 ASSESSMENT — PAIN DESCRIPTION - DESCRIPTORS: DESCRIPTORS: TIGHTNESS

## 2024-05-21 ASSESSMENT — PATIENT HEALTH QUESTIONNAIRE - PHQ9
SUM OF ALL RESPONSES TO PHQ9 QUESTIONS 1 & 2: 0
2. FEELING DOWN, DEPRESSED OR HOPELESS: NOT AT ALL
1. LITTLE INTEREST OR PLEASURE IN DOING THINGS: NOT AT ALL

## 2024-05-21 ASSESSMENT — PAIN - FUNCTIONAL ASSESSMENT
PAIN_FUNCTIONAL_ASSESSMENT: WONG-BAKER FACES
PAIN_FUNCTIONAL_ASSESSMENT: FLACC (FACE, LEGS, ACTIVITY, CRY, CONSOLABILITY)
PAIN_FUNCTIONAL_ASSESSMENT: 0-10

## 2024-05-21 ASSESSMENT — PAIN DESCRIPTION - ORIENTATION
ORIENTATION: LEFT
ORIENTATION: LEFT

## 2024-05-21 ASSESSMENT — ENCOUNTER SYMPTOMS
ACTIVITY CHANGE: 1
GASTROINTESTINAL NEGATIVE: 1
HEMATOLOGIC/LYMPHATIC NEGATIVE: 1
RESPIRATORY NEGATIVE: 1
ALLERGIC/IMMUNOLOGIC NEGATIVE: 1
EYES NEGATIVE: 1
PSYCHIATRIC NEGATIVE: 1
CARDIOVASCULAR NEGATIVE: 1
ENDOCRINE NEGATIVE: 1

## 2024-05-21 ASSESSMENT — PAIN DESCRIPTION - LOCATION
LOCATION: KNEE
LOCATION: KNEE

## 2024-05-21 NOTE — ANESTHESIA PROCEDURE NOTES
Peripheral Block    Patient location during procedure: pre-op  Start time: 5/21/2024 10:05 AM  End time: 5/21/2024 10:06 AM  Reason for block: at surgeon's request and post-op pain management  Staffing  Performed: attending   Authorized by: Jacinta Mao MD    Performed by: Jacinta Mao MD  Preanesthetic Checklist  Completed: patient identified, IV checked, site marked, risks and benefits discussed, surgical consent, monitors and equipment checked, pre-op evaluation and timeout performed   Timeout performed at: 5/21/2024 10:00 AM  Peripheral Block  Patient position: laying flat  Prep: ChloraPrep  Patient monitoring: heart rate, continuous pulse ox and cardiac monitor  Block type: adductor canal  Laterality: left  Injection technique: single-shot  Guidance: ultrasound guided  Local infiltration: ropivacaine  Infiltration strength: 0.5 %  Dose: 20 mL  Needle  Needle gauge: 21 G  Needle length: 10 cm  Needle localization: ultrasound guidance     image stored in chart  Assessment  Injection assessment: incremental injection, negative aspiration for heme, local visualized surrounding nerve on ultrasound and no paresthesia on injection  Paresthesia pain: none  Heart rate change: no  Slow fractionated injection: yes

## 2024-05-21 NOTE — ANESTHESIA PREPROCEDURE EVALUATION
Patient: Leanne Mims    Procedure Information       Date/Time: 05/21/24 1000    Procedure: OPEN TOTAL KNEE ARTHROPLASTY (Left: Knee)    Location: [facility] / [facility]    Surgeons: Adarsh Thorpe MD          Past Medical History:   Diagnosis Date    Anxiety 09/05/2023    Cataract 2023    Dizziness 12/29/2023    Only upon getting up from laying down sometimes.    Elevated cholesterol 09/05/2023    Essential hypertension 09/05/2023    IFG (impaired fasting glucose) 09/05/2023    Meningioma (Multi) 09/05/2023 5/23 w/wo menigioma No issues  neurosurgery. craniovertebarl junction    Neuropathy 09/05/2023    R>L TSH,B12, SPEP M protein? Monoclonal anaylsis neg. 5/22.    Personal history of malignant neoplasm of breast 09/05/2023    Left XRT, Letrizole 11/17 filed    Primary osteoarthritis involving multiple joints 12/29/2023     for left knee DJD.    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     Past Surgical History:   Procedure Laterality Date    BREAST LUMPECTOMY  2017    Had radiation treatment.     Allergies   Allergen Reactions    Penicillins Other     Difficulty breathing        Relevant Problems   Anesthesia (within normal limits)      Cardiac   (+) Elevated cholesterol   (+) Essential hypertension   (+) Hyperlipidemia      Pulmonary (within normal limits)      Neuro   (+) Anxiety   (+) Barnhart's neuroma of right foot      GI (within normal limits)      /Renal (within normal limits)      Liver (within normal limits)      Endocrine (within normal limits)      Hematology (within normal limits)      Musculoskeletal   (+) Osteoarthritis of right foot   (+) Primary osteoarthritis involving multiple joints      HEENT   (+) Acute frontal sinusitis      ID (within normal limits)      Skin   (+) Rash and other nonspecific skin eruption      GYN   (+) Breast cancer (Multi)       Clinical information reviewed:    Allergies   Meds   Med Hx  Surg Hx  OB Status  Fam Hx  Soc Hx        NPO Detail:  No data recorded     Physical Exam    Airway  Mallampati: II  TM distance: >3 FB  Neck ROM: full     Cardiovascular   Rhythm: regular  Rate: normal     Dental    Pulmonary   Breath sounds clear to auscultation     Abdominal            Anesthesia Plan    History of general anesthesia?: yes  History of complications of general anesthesia?: no    ASA 3     general and regional     intravenous induction   Postoperative administration of opioids is intended.  Anesthetic plan and risks discussed with patient.  Use of blood products discussed with patient who.

## 2024-05-21 NOTE — ANESTHESIA PROCEDURE NOTES
Airway  Date/Time: 5/21/2024 10:15 AM  Urgency: elective    Airway not difficult    Staffing  Performed: CRNA   Authorized by: Jacinta Mao MD    Performed by: ANTOINETTE Merritt-MARGARITA  Patient location during procedure: OR    Indications and Patient Condition  Indications for airway management: anesthesia  Spontaneous ventilation: present  Sedation level: deep  Preoxygenated: yes  Patient position: sniffing  Mask difficulty assessment: 0 - not attempted  No planned trial extubation    Final Airway Details  Final airway type: supraglottic airway      Successful airway: classic  Size 4     Number of attempts at approach: 1

## 2024-05-21 NOTE — OP NOTE
OPEN TOTAL KNEE ARTHROPLASTY  26059 - HI ARTHRP KNE CONDYLE&PLATU MEDIAL&LAT COMPARTMENTS   Operative Note     Date: 2024  OR Location: [facility]    Name: Leanne Mims, : 1950, Age: 73 y.o., MRN: [id number], Sex: female    PRE Diagnosis  Left knee osteoarthritis    POST Diagnosis  Same    Procedures  Left total knee arthroplasty parapatellar approach    Surgeons      * Adarsh Thorpe - Primary    Resident/Fellow/Other Assistant:  hermes Mcghee courtney    Procedure Summary  Implants:  Styker Cemented Triathlon Total Knee: Femur size 6 posterior stabilized, size 6 universal tibial baseplate, size A38 N2Vac Patella, size 6x13 posterior stabilized N2Vac tibial bearing insert     Anesthesia: General LMA with adductor canal block  ASA: III  Anesthesia Staff:   Anesthesiologist: Jacinta Mao MD  CRNA: JOSE Merritt  Estimated Blood Loss: 50 mL    Specimen: No specimens collected     Staff:   Circulator: Alysa Parish RN; Aida England RN  Scrub Person: Vamsi Luna; Vianey Healy         Drains and/or Catheters:   1 Hemovac    Tourniquet Times:     Total Tourniquet Time Documented:  Thigh (Left) - 65 minutes  Total: Thigh (Left) - 65 minutes             Findings: Severe degenerative change of the medial joint space with erosion of the posterior medial tibial plateau.  Surrounding osteophyte formation.  Generalized osteopenia.  Stable implantation of the above components.  Neutral alignment in extension stable to axial loading exam.  Patella tracked normally through the parapatellar approach.    Indications: Leanne Mims is an 73 y.o. female who is having surgery for LEFT KNEE OSTEOARTHRITIS.   Patient presented with pain and symptoms of her left knee progressive over years.  Pain limiting activities not relieved with conservative care affecting daily activities.  She had reached a point of disability.  X-rays revealed severe degenerative change of the  joint space medially with erosion of the proximal tibia and varus alignment.  I discussed with her the clinical options and she elected to proceed with a knee replacement surgery.  I discussed with the patient the risks, benefits, alternatives, complications of a total knee replacement.  The risks, including but not limited to, deep vein thrombosis, pulmonary embolism, infection, knee stiffness, periprosthetic fracture, damage to ligaments tendons or neurovascular structures was discussed.  Despite these risks they elected to proceed.    Procedure Details: Medications:  Antibiotic vancomycin 1 g IV, clindamycin 900 mg IV,Tranexamic acid 2 g IV, Decadron 10 milligrams IV    Patient was seen and evaluated in the preoperative area and the left leg was marked as the operative extremity.  They were then brought back to the operating room after anesthesia administered an adductor canal block.  Patient was laid supine on the table and anesthesia team controlled the head neck and airway administered general anesthetic.  Richardson catheter was inserted.  The well leg was wrapped and secured to the table.  The operative leg was prepped with a well-padded thigh tourniquet.  We then washed the skin with a chlorhexidine wash and alcohol.  We then prepped with a ChloraPrep and draped in the usual fashion.  An operative time-out was performed.  I then exsanguinated the leg with an Esmarch and elevated the tourniquet to 250 millimeters of mercury.      I marked out the anterior landmarks of the knee and a midline incision.  I created the incision with the scalpel and used the Bovie to dissect down through the subcutaneous tissue to the level of the extensor mechanism.  I created full-thickness skin flaps medial and laterally.  I marked out the medial parapatellar approach.  I created this with the scalpel.  I then used the Bovie to dissect a subperiosteal dissection off the proximal medial tibia.  I removed the infrapatellar fat pad.   I released the lateral synovium and was able to flex the knee and translate the patella laterally.  Patient had severe wear of the medial compartment.  I marked out Whitesides line and debrided the anterior cruciate ligament.  I debrided the intercondylar notch and used a drill to enter the femoral canal.  I then used a flexible intramedullary alignment guide with distal cutting jig set for 5 degree valgus with an 8 millimeter resection.  I set this on the more prominent lateral condyle.  I pinned this into position and protected the soft tissues while I  resected the distal femur.  My cut measured at 8 millimeters.   There was a smooth resection off the medial condyle.  I now turned my attention to the tibia.  I used intramedullary guidance.  I drilled through the center of the tibial spines into the proximal tibia.  I then placed the intramedullary alignment guide.  I then placed the cutting jig.  I pinned the block in position with  a 9 millimeter resection off the lateral side.  I then used a drop carlie as a secondary check.  I then protected the soft tissues and resected the proximal tibia.  I measured the lateral thickness at 10 millimeters.  I now brought the knee into extension and used the gap balancing block.  I was satisfied with the alignment and the extension gap.  I did note that the medial defect of the tibia was not completely excised and still one third of the posterior aspect was worn.  Now returned to the femur using the 3 degree external rotation sizing guide and pinned this into position  with respect to Whitesides line, epicondylar axis and a tibial cut.  I measured the femur at a size 6. I placed the 4 in 1 cutting block and pinned this down.  I then resected the anterior and posterior condyles and chamfers.  I then cleaned the bone cuts with a rongeur and an osteotome.  I now placed the box cutting guide in line with the lateral border of the femur.  I pinned this into position then used a  distal chisel and oscillating saw to resect the box and removed the PCL with the Bovie.  I now clean the posterior aspect of the knee with the Bovie removing the medial and lateral meniscus in their entirety.  I preserved the popliteus tendon.  I then took a curved osteotome and resected the posterior condylar osteophytes bilaterally  decompressing the posterior space.  I provisionally sized the tibia to a size 6. I did a trial reduction with the femur, tibia and a 9 millimeter insert.   I brought the knee through range of motion was satisfied with the tracking and varus valgus stability throughout.  The native patella tracked normally.  I now removed the trial components.  I elected to take an additional 2 mm resection off the proximal tibia to deepen the cut along the posterior defect.  After the secondary resection the posterior aspect was only uncovered by approximately 20%.  It did not appear to us affect the stability of the tibial implant.  I now trialed again with the size 11 mm insert.  I was still satisfied with the kinematics of the knee.  I everted the patella with 2 towel clips.  I circumferentially burned around this with the Bovie.    The patella measured 26 in thickness. I took a freehand resection down to 14 millimeters of bone circumferentially.   I sized an asymmetric 38.  I alligned the drill guide with the medial border and drilled for the cemented component.  I then placed a trial and tested the kinematics and the patella tracked normally through the parapatellar approach.  I now removed the trial components and finished my tibial preparation.  I pinned the base plate into position and then used the boss reamer, keel punch and an oversized keel punched for the cemented technique.  I now removed this and thoroughly irrigated the soft tissue and bone in preparation for implantation.  I used standard cementation technique.  I flexed the knee and translated the tibia forward.  I finger packed the  cement into the boss area and keel and then seated the tibial component reducing the removing the extra cement.  I then finger packed cement onto the distal femur seated the femoral component and placed the trial polyethylene as the cement cured.  I also irrigated clean and dried the patella seated the patellar component. After cement had thoroughly cured I tested the kinematics with the size 13 millimeter insert and was satisfied with the tracking, alignment, and stability of the knee.  I removed this insert trial, cleaned the locking mechanism and seated the posterior stabilized tibial bearing insert.  The fit was secure medial and laterally.  I extended the knee and irrigated with Betadine.  I washed this out with normal saline.  I packed the need and dropped the tourniquet to obtain hemostasis. I then placed 1 intra-articular drain and closed the arthrotomy in a semi flexed position with #2 Ethibond for a watertight seal tested through range of motion.  I then irrigated and closed in a layered fashion of running 0 Vicryl, buried interrupted 2-0 Vicryl, running 4-0 Monocryl.  Dermabond and a silver dressing were applied.  The patient tolerated procedure well and was awakened taken to PACU in stable condition.  No complications encountered.  Complications:  None; patient tolerated the procedure well.    Disposition: PACU - hemodynamically stable.  Condition: stable             Attending Attestation: I performed the procedure.    Adarsh Thorpe  Phone Number: [phone]

## 2024-05-21 NOTE — ANESTHESIA POSTPROCEDURE EVALUATION
Patient: Leanne Mims    Procedure Summary       Date: 05/21/24 Room / Location: [facility] OR 08 / [facility]    Anesthesia Start: 1009 Anesthesia Stop: 1234    Procedure: OPEN TOTAL KNEE ARTHROPLASTY (Left: Knee) Diagnosis:       Primary osteoarthritis of left knee      (LEFT KNEE OSTEOARTHRITIS)    Surgeons: Adarsh Thorpe MD Responsible Provider: Jacinta Mao MD    Anesthesia Type: general, regional ASA Status: 3            Anesthesia Type: general, regional    Vitals Value Taken Time   /70 05/21/24 1231   Temp 36 °C (96.8 °F) 05/21/24 1231   Pulse 56 05/21/24 1231   Resp 16 05/21/24 1231   SpO2 96 % 05/21/24 1231       Anesthesia Post Evaluation    Patient location during evaluation: PACU  Patient participation: complete - patient participated  Level of consciousness: awake  Pain score: 0  Pain management: adequate  Multimodal analgesia pain management approach  Airway patency: patent  Two or more strategies used to mitigate risk of obstructive sleep apnea  Cardiovascular status: acceptable  Respiratory status: acceptable  Hydration status: acceptable  Postoperative Nausea and Vomiting: none  Comments: No Nausea    There were no known notable events for this encounter.

## 2024-05-21 NOTE — CONSULTS
"Consults    Reason For Consult  \"Post-op medical evaluation\"    History Of Present Illness  Leanne Mims is a 73 y.o. female, who is POD #0 left total knee arthroplasty with Dr. Thorpe. She elected for this procedure due to a history of osteoarthritis that had reached a point of disability. She also has a past medical history significant for breast cancer s/p left lumpectomy, HTN, HLD, Pre-diabetes/elevated blood glucose and depression.     She is evaluated immediately postop on the regular nursing floor. Present bedside is the patient's sister and friend.  Presently, she denies chest pain or shortness of breath. She denies nausea or vomiting. She is in no acute distress.      Past Medical History  She has a past medical history of Anxiety (09/05/2023), Cataract (2023), Dizziness (12/29/2023), Elevated cholesterol (09/05/2023), Essential hypertension (09/05/2023), IFG (impaired fasting glucose) (09/05/2023), Meningioma (Multi) (09/05/2023), Neuropathy (09/05/2023), Personal history of malignant neoplasm of breast (09/05/2023), Primary osteoarthritis involving multiple joints (12/29/2023), and Vertigo (12/29/2023).    Surgical History  She has a past surgical history that includes Breast lumpectomy (2017).     Social History  She reports that she has never smoked. She has never been exposed to tobacco smoke. She has never used smokeless tobacco. She reports current alcohol use of about 3.0 standard drinks of alcohol per week. She reports that she does not use drugs.    Family History  Family History   Problem Relation Name Age of Onset    Heart disease Mother Eunice Denis     Hypertension Mother Eunice Denis     Cancer Father Layo Denis         Allergies  Penicillins    Review of Systems  Review of Systems   Constitutional:  Positive for activity change.   HENT: Negative.     Eyes: Negative.    Respiratory: Negative.     Cardiovascular: Negative.    Gastrointestinal: Negative.    Endocrine: Negative.  "   Genitourinary: Negative.    Musculoskeletal:  Positive for gait problem.   Skin: Negative.    Allergic/Immunologic: Negative.    Hematological: Negative.    Psychiatric/Behavioral: Negative.     All other systems reviewed and are negative.       Physical Exam  Physical Exam  Vitals and nursing note reviewed.   Constitutional:       Appearance: Normal appearance.   HENT:      Head: Normocephalic and atraumatic.      Mouth/Throat:      Mouth: Mucous membranes are moist.   Eyes:      Extraocular Movements: Extraocular movements intact.      Pupils: Pupils are equal, round, and reactive to light.   Cardiovascular:      Rate and Rhythm: Normal rate.      Pulses: Normal pulses.      Heart sounds: Normal heart sounds.   Pulmonary:      Effort: Pulmonary effort is normal.      Breath sounds: Normal breath sounds.   Abdominal:      General: Bowel sounds are normal.      Palpations: Abdomen is soft.   Musculoskeletal:      Cervical back: Normal range of motion and neck supple.      Comments: ROM left lower extremity limited due to post operative pain, otherwise ROM intact   Skin:     General: Skin is warm and dry.      Capillary Refill: Capillary refill takes less than 2 seconds.      Comments: Left knee dressing clean dry and intact, with attached intact hemovac drain     Neurological:      General: No focal deficit present.      Mental Status: She is alert and oriented to person, place, and time.   Psychiatric:         Mood and Affect: Mood normal.         Behavior: Behavior normal.            Last Recorded Vitals  /59 (BP Location: Right arm, Patient Position: Lying)   Pulse 58   Temp 36.3 °C (97.3 °F) (Oral)   Resp 16   SpO2 94%     Relevant Results  No results found for this or any previous visit (from the past 24 hour(s)).   XR knee left 1-2 views    Result Date: 5/21/2024  Interpreted By:  Dimas Robert, STUDY: XR KNEE LEFT 1-2 VIEWS; 5/21/2024 1:15 pm   INDICATION: Signs/Symptoms:Post-op knee.    COMPARISON: 09/11/2020   ACCESSION NUMBER(S): [id number]   ORDERING CLINICIAN: TILA KNOTT   TECHNIQUE: Left knee portable two views   FINDINGS: No fractures or destructive lesions are identified. There is a total left knee prosthesis present with surgical drain and soft tissue air identified.       Status post left knee arthroplasty.   MACRO: none   Signed by: Dimas Robert 5/21/2024 2:24 PM Dictation workstation:   [id number]       Assessment/Plan     POD #0 Left total knee arthroplasty  As managed by primary service  PRN pain management regimen  DVT prophylaxis  PT/OT  Incentive spirometer instruction  Falls precautions    HTN  Normotensive currently, hold home losartan-hydrochlorothiazide  Continue home atenolol  Monitor vital signs    HLD  Continue home statin    Elevated blood glucose  Pre-diabetic  Hemoglobin A1c 6.2 5/15/24  Patient does not take anything for this at home   Will order BID blood sugar checks, advise patient to follow up with PCP regarding this    Hx of Breast cancer, s/p left lumpectomy  Limit venipuncture, vital signs to RUE only   Continue home letrozole    Hx Depression  Continue home sertraline    DVT/GI  Lovenox, H2 blocker    Thank you for this consult. Will continue to follow.     Isabela Gibson, APRN-CNP

## 2024-05-21 NOTE — CARE PLAN
The patient's goals for the shift include      The clinical goals for the shift include pain management      Problem: Pain  Goal: My pain/discomfort is manageable  Outcome: Progressing     Problem: Safety  Goal: Patient will be injury free during hospitalization  Outcome: Progressing     Problem: Daily Care  Goal: Daily care needs are met  Outcome: Progressing     Problem: Psychosocial Needs  Goal: Demonstrates ability to cope with hospitalization/illness  Outcome: Progressing     Problem: Discharge Barriers  Goal: My discharge needs are met  Outcome: Progressing

## 2024-05-21 NOTE — SIGNIFICANT EVENT
Pt arrived at 1229. VSS. POC [id number]. Pt aroused and denies pain or nausea.Ice to left knee. Circ intact. #20 to right wrist with 2nd bag LR infusing.  Bulky dressing to left knee with stockinette wrap D&I. Exiting hemovac drain to suction with bloody drainage. Richardson to CD.

## 2024-05-22 ENCOUNTER — DOCUMENTATION (OUTPATIENT)
Dept: HOME HEALTH SERVICES | Facility: HOME HEALTH | Age: 74
End: 2024-05-22
Payer: MEDICARE

## 2024-05-22 ENCOUNTER — HOME HEALTH ADMISSION (OUTPATIENT)
Dept: HOME HEALTH SERVICES | Facility: HOME HEALTH | Age: 74
End: 2024-05-22
Payer: MEDICARE

## 2024-05-22 ENCOUNTER — APPOINTMENT (OUTPATIENT)
Dept: CARDIOLOGY | Facility: HOSPITAL | Age: 74
End: 2024-05-22
Payer: MEDICARE

## 2024-05-22 VITALS
BODY MASS INDEX: 28.19 KG/M2 | WEIGHT: 186 LBS | OXYGEN SATURATION: 94 % | RESPIRATION RATE: 15 BRPM | HEART RATE: 62 BPM | TEMPERATURE: 97.7 F | HEIGHT: 68 IN | SYSTOLIC BLOOD PRESSURE: 144 MMHG | DIASTOLIC BLOOD PRESSURE: 69 MMHG

## 2024-05-22 LAB
ANION GAP SERPL CALC-SCNC: 11 MMOL/L
BUN SERPL-MCNC: 29 MG/DL (ref 8–25)
CALCIUM SERPL-MCNC: 8.8 MG/DL (ref 8.5–10.4)
CHLORIDE SERPL-SCNC: 102 MMOL/L (ref 97–107)
CO2 SERPL-SCNC: 21 MMOL/L (ref 24–31)
CREAT SERPL-MCNC: 1.1 MG/DL (ref 0.4–1.6)
EGFRCR SERPLBLD CKD-EPI 2021: 53 ML/MIN/1.73M*2
ERYTHROCYTE [DISTWIDTH] IN BLOOD BY AUTOMATED COUNT: 13.8 % (ref 11.5–14.5)
GLUCOSE SERPL-MCNC: 119 MG/DL (ref 65–99)
HCT VFR BLD AUTO: 32.1 % (ref 36–46)
HGB BLD-MCNC: 10.6 G/DL (ref 12–16)
MCH RBC QN AUTO: 31.9 PG (ref 26–34)
MCHC RBC AUTO-ENTMCNC: 33 G/DL (ref 32–36)
MCV RBC AUTO: 97 FL (ref 80–100)
NRBC BLD-RTO: 0 /100 WBCS (ref 0–0)
PLATELET # BLD AUTO: 254 X10*3/UL (ref 150–450)
POTASSIUM SERPL-SCNC: 4.6 MMOL/L (ref 3.4–5.1)
RBC # BLD AUTO: 3.32 X10*6/UL (ref 4–5.2)
SODIUM SERPL-SCNC: 134 MMOL/L (ref 133–145)
WBC # BLD AUTO: 9.9 X10*3/UL (ref 4.4–11.3)

## 2024-05-22 PROCEDURE — 97161 PT EVAL LOW COMPLEX 20 MIN: CPT | Mod: GP | Performed by: PHYSICAL THERAPIST

## 2024-05-22 PROCEDURE — 97530 THERAPEUTIC ACTIVITIES: CPT | Mod: GO

## 2024-05-22 PROCEDURE — 85027 COMPLETE CBC AUTOMATED: CPT | Performed by: ORTHOPAEDIC SURGERY

## 2024-05-22 PROCEDURE — 93005 ELECTROCARDIOGRAM TRACING: CPT

## 2024-05-22 PROCEDURE — 80048 BASIC METABOLIC PNL TOTAL CA: CPT | Performed by: ORTHOPAEDIC SURGERY

## 2024-05-22 PROCEDURE — 97165 OT EVAL LOW COMPLEX 30 MIN: CPT | Mod: GO

## 2024-05-22 PROCEDURE — 2500000006 HC RX 250 W HCPCS SELF ADMINISTERED DRUGS (ALT 637 FOR ALL PAYERS): Performed by: ORTHOPAEDIC SURGERY

## 2024-05-22 PROCEDURE — 2500000004 HC RX 250 GENERAL PHARMACY W/ HCPCS (ALT 636 FOR OP/ED): Performed by: ORTHOPAEDIC SURGERY

## 2024-05-22 PROCEDURE — 7100000011 HC EXTENDED STAY RECOVERY HOURLY - NURSING UNIT

## 2024-05-22 PROCEDURE — 2500000001 HC RX 250 WO HCPCS SELF ADMINISTERED DRUGS (ALT 637 FOR MEDICARE OP)

## 2024-05-22 PROCEDURE — 96372 THER/PROPH/DIAG INJ SC/IM: CPT | Performed by: ORTHOPAEDIC SURGERY

## 2024-05-22 PROCEDURE — 36415 COLL VENOUS BLD VENIPUNCTURE: CPT | Performed by: ORTHOPAEDIC SURGERY

## 2024-05-22 PROCEDURE — 2500000001 HC RX 250 WO HCPCS SELF ADMINISTERED DRUGS (ALT 637 FOR MEDICARE OP): Performed by: ORTHOPAEDIC SURGERY

## 2024-05-22 PROCEDURE — 97535 SELF CARE MNGMENT TRAINING: CPT | Mod: GO

## 2024-05-22 PROCEDURE — 97110 THERAPEUTIC EXERCISES: CPT | Mod: GP | Performed by: PHYSICAL THERAPIST

## 2024-05-22 PROCEDURE — 97530 THERAPEUTIC ACTIVITIES: CPT | Mod: GP | Performed by: PHYSICAL THERAPIST

## 2024-05-22 RX ORDER — ACETAMINOPHEN 500 MG
1000 TABLET ORAL EVERY 6 HOURS PRN
Qty: 120 TABLET | Refills: 0 | Status: SHIPPED | OUTPATIENT
Start: 2024-05-22 | End: 2024-06-06

## 2024-05-22 RX ORDER — IBUPROFEN 600 MG/1
600 TABLET ORAL EVERY 6 HOURS PRN
Qty: 20 TABLET | Refills: 0 | Status: SHIPPED | OUTPATIENT
Start: 2024-05-22 | End: 2024-05-27

## 2024-05-22 RX ORDER — ENOXAPARIN SODIUM 100 MG/ML
40 INJECTION SUBCUTANEOUS DAILY
Qty: 28 EACH | Refills: 0 | Status: SHIPPED | OUTPATIENT
Start: 2024-05-22 | End: 2024-06-19

## 2024-05-22 RX ORDER — DOCUSATE SODIUM 100 MG/1
100 CAPSULE, LIQUID FILLED ORAL 2 TIMES DAILY
Qty: 30 CAPSULE | Refills: 0 | Status: SHIPPED | OUTPATIENT
Start: 2024-05-22 | End: 2024-06-06

## 2024-05-22 RX ORDER — OXYCODONE HYDROCHLORIDE 5 MG/1
5 TABLET ORAL EVERY 4 HOURS PRN
Qty: 40 TABLET | Refills: 0 | Status: SHIPPED | OUTPATIENT
Start: 2024-05-22 | End: 2024-05-29

## 2024-05-22 RX ADMIN — SODIUM CHLORIDE, SODIUM LACTATE, POTASSIUM CHLORIDE, AND CALCIUM CHLORIDE 125 ML/HR: 600; 310; 30; 20 INJECTION, SOLUTION INTRAVENOUS at 04:45

## 2024-05-22 RX ADMIN — DOCUSATE SODIUM 100 MG: 100 CAPSULE, LIQUID FILLED ORAL at 08:25

## 2024-05-22 RX ADMIN — OXYCODONE HYDROCHLORIDE 10 MG: 5 TABLET ORAL at 10:17

## 2024-05-22 RX ADMIN — CLINDAMYCIN PHOSPHATE 900 MG: 900 INJECTION, SOLUTION INTRAVENOUS at 04:44

## 2024-05-22 RX ADMIN — SERTRALINE 50 MG: 50 TABLET, FILM COATED ORAL at 08:25

## 2024-05-22 RX ADMIN — MULTIVITAMIN TABLET 1 TABLET: TABLET at 08:24

## 2024-05-22 RX ADMIN — ATENOLOL 50 MG: 50 TABLET ORAL at 08:26

## 2024-05-22 RX ADMIN — ENOXAPARIN SODIUM 40 MG: 40 INJECTION SUBCUTANEOUS at 08:24

## 2024-05-22 RX ADMIN — ACETAMINOPHEN 1000 MG: 500 TABLET ORAL at 10:18

## 2024-05-22 RX ADMIN — OXYCODONE HYDROCHLORIDE 10 MG: 5 TABLET ORAL at 04:45

## 2024-05-22 RX ADMIN — LETROZOLE 2.5 MG: 2.5 TABLET ORAL at 08:26

## 2024-05-22 RX ADMIN — FAMOTIDINE 20 MG: 20 TABLET, FILM COATED ORAL at 08:26

## 2024-05-22 RX ADMIN — POLYETHYLENE GLYCOL 3350 17 G: 17 POWDER, FOR SOLUTION ORAL at 08:40

## 2024-05-22 RX ADMIN — ACETAMINOPHEN 1000 MG: 500 TABLET ORAL at 05:52

## 2024-05-22 ASSESSMENT — COGNITIVE AND FUNCTIONAL STATUS - GENERAL
MOBILITY SCORE: 20
TOILETING: A LOT
WALKING IN HOSPITAL ROOM: A LOT
STANDING UP FROM CHAIR USING ARMS: A LITTLE
TURNING FROM BACK TO SIDE WHILE IN FLAT BAD: A LITTLE
STANDING UP FROM CHAIR USING ARMS: A LITTLE
DRESSING REGULAR UPPER BODY CLOTHING: A LITTLE
PERSONAL GROOMING: A LOT
CLIMB 3 TO 5 STEPS WITH RAILING: A LOT
TURNING FROM BACK TO SIDE WHILE IN FLAT BAD: A LITTLE
WALKING IN HOSPITAL ROOM: A LITTLE
MOVING FROM LYING ON BACK TO SITTING ON SIDE OF FLAT BED WITH BEDRAILS: A LITTLE
DAILY ACTIVITIY SCORE: 17
CLIMB 3 TO 5 STEPS WITH RAILING: A LITTLE
MOVING FROM LYING ON BACK TO SITTING ON SIDE OF FLAT BED WITH BEDRAILS: A LITTLE
STANDING UP FROM CHAIR USING ARMS: A LITTLE
HELP NEEDED FOR BATHING: A LITTLE
CLIMB 3 TO 5 STEPS WITH RAILING: A LOT
MOBILITY SCORE: 18
TURNING FROM BACK TO SIDE WHILE IN FLAT BAD: A LITTLE
TOILETING: A LITTLE
MOVING TO AND FROM BED TO CHAIR: A LITTLE
PERSONAL GROOMING: A LITTLE
WALKING IN HOSPITAL ROOM: A LITTLE
HELP NEEDED FOR BATHING: A LITTLE
CLIMB 3 TO 5 STEPS WITH RAILING: A LITTLE
MOVING FROM LYING ON BACK TO SITTING ON SIDE OF FLAT BED WITH BEDRAILS: A LITTLE
WALKING IN HOSPITAL ROOM: A LOT
DRESSING REGULAR LOWER BODY CLOTHING: A LITTLE
DRESSING REGULAR LOWER BODY CLOTHING: A LITTLE
DAILY ACTIVITIY SCORE: 17
MOVING TO AND FROM BED TO CHAIR: A LITTLE
TOILETING: A LOT
DRESSING REGULAR LOWER BODY CLOTHING: A LOT
DRESSING REGULAR UPPER BODY CLOTHING: A LITTLE
MOVING TO AND FROM BED TO CHAIR: A LITTLE
DRESSING REGULAR UPPER BODY CLOTHING: A LITTLE
PERSONAL GROOMING: A LOT
MOBILITY SCORE: 16
STANDING UP FROM CHAIR USING ARMS: A LITTLE
DAILY ACTIVITIY SCORE: 18
HELP NEEDED FOR BATHING: A LITTLE
MOVING TO AND FROM BED TO CHAIR: A LITTLE
MOBILITY SCORE: 16

## 2024-05-22 ASSESSMENT — PAIN DESCRIPTION - ORIENTATION: ORIENTATION: LEFT

## 2024-05-22 ASSESSMENT — PAIN - FUNCTIONAL ASSESSMENT
PAIN_FUNCTIONAL_ASSESSMENT: 0-10
PAIN_FUNCTIONAL_ASSESSMENT: WONG-BAKER FACES
PAIN_FUNCTIONAL_ASSESSMENT: 0-10
PAIN_FUNCTIONAL_ASSESSMENT: 0-10

## 2024-05-22 ASSESSMENT — ACTIVITIES OF DAILY LIVING (ADL)
BATHING_ASSISTANCE: MINIMAL
ADL_ASSISTANCE: INDEPENDENT
ADL_ASSISTANCE: INDEPENDENT
HOME_MANAGEMENT_TIME_ENTRY: 15

## 2024-05-22 ASSESSMENT — PAIN SCALES - GENERAL
PAINLEVEL_OUTOF10: 4
PAINLEVEL_OUTOF10: 9
PAINLEVEL_OUTOF10: 0 - NO PAIN
PAINLEVEL_OUTOF10: 6
PAINLEVEL_OUTOF10: 0 - NO PAIN
PAINLEVEL_OUTOF10: 9
PAINLEVEL_OUTOF10: 6
PAINLEVEL_OUTOF10: 7

## 2024-05-22 ASSESSMENT — PAIN SCALES - WONG BAKER
WONGBAKER_NUMERICALRESPONSE: NO HURT
WONGBAKER_NUMERICALRESPONSE: NO HURT

## 2024-05-22 ASSESSMENT — PAIN DESCRIPTION - DESCRIPTORS: DESCRIPTORS: ACHING

## 2024-05-22 ASSESSMENT — PAIN DESCRIPTION - LOCATION: LOCATION: KNEE

## 2024-05-22 NOTE — CARE PLAN
The patient's goals for the shift include      The clinical goals for the shift include pain control      Problem: Pain  Goal: My pain/discomfort is manageable  5/22/2024 1006 by Verenice Ray RN  Outcome: Adequate for Discharge  5/22/2024 0735 by Verenice Ray RN  Outcome: Progressing     Problem: Safety  Goal: Patient will be injury free during hospitalization  5/22/2024 1006 by Verenice Ray RN  Outcome: Adequate for Discharge  5/22/2024 0735 by Verenice Ray RN  Outcome: Progressing     Problem: Daily Care  Goal: Daily care needs are met  5/22/2024 1006 by Verenice Ray RN  Outcome: Adequate for Discharge  5/22/2024 0735 by Verenice Ray RN  Outcome: Progressing     Problem: Psychosocial Needs  Goal: Demonstrates ability to cope with hospitalization/illness  5/22/2024 1006 by Verenice Ray RN  Outcome: Adequate for Discharge  5/22/2024 0735 by Verenice Ray RN  Outcome: Progressing     Problem: Discharge Barriers  Goal: My discharge needs are met  5/22/2024 1006 by Verenice Ray RN  Outcome: Adequate for Discharge  5/22/2024 0735 by Verenice Ray RN  Outcome: Progressing

## 2024-05-22 NOTE — HH CARE COORDINATION
Home Care received a Referral for Physical Therapy. We have processed the referral for a Start of Care on 05/23.     If you have any questions or concerns, please feel free to contact us at [phone]. Follow the prompts, enter your five digit zip code, and you will be directed to your care team on [facility].

## 2024-05-22 NOTE — PROGRESS NOTES
"Leanne Mims is a 73 y.o. female on day 0 of admission presenting with Arthritis of left knee.    Subjective   Resting in bed.  Feeling comfortable.  Did not have therapy yesterday she arrived to the room later in the afternoon.  Pain is controlled.  Denies nausea or vomiting.  Denies chest pain or shortness of breath.  Wants to go home today.       Objective     Physical Exam  Alert and oriented x 3  Left lower extremity: Drain output noted.  Dressing dry.  Able to straight leg raise.  Compartments are soft throughout the lower extremity.  Active ankle dorsiflexion plantarflexion intact.  Otherwise neurovascularly intact.    Last Recorded Vitals  Blood pressure 108/63, pulse 98, temperature 36.7 °C (98.1 °F), temperature source Oral, resp. rate 16, height 1.727 m (5' 7.99\"), weight 84.4 kg (186 lb), SpO2 99%.      Relevant Results  I reviewed postoperative imaging of the left total knee: Stable positioning of cemented total knee replacement.    Results for orders placed or performed during the hospital encounter of 05/21/24 (from the past 24 hour(s))   CBC   Result Value Ref Range    WBC 9.9 4.4 - 11.3 x10*3/uL    nRBC 0.0 0.0 - 0.0 /100 WBCs    RBC 3.32 (L) 4.00 - 5.20 x10*6/uL    Hemoglobin 10.6 (L) 12.0 - 16.0 g/dL    Hematocrit 32.1 (L) 36.0 - 46.0 %    MCV 97 80 - 100 fL    MCH 31.9 26.0 - 34.0 pg    MCHC 33.0 32.0 - 36.0 g/dL    RDW 13.8 11.5 - 14.5 %    Platelets 254 150 - 450 x10*3/uL   Basic metabolic panel   Result Value Ref Range    Glucose 119 (H) 65 - 99 mg/dL    Sodium 134 133 - 145 mmol/L    Potassium 4.6 3.4 - 5.1 mmol/L    Chloride 102 97 - 107 mmol/L    Bicarbonate 21 (L) 24 - 31 mmol/L    Urea Nitrogen 29 (H) 8 - 25 mg/dL    Creatinine 1.10 0.40 - 1.60 mg/dL    eGFR 53 (L) >60 mL/min/1.73m*2    Calcium 8.8 8.5 - 10.4 mg/dL    Anion Gap 11 <=19 mmol/L       Assessment/Plan   Principal Problem:    Arthritis of left knee      Problem List:  Left total knee replacement: Postoperative day #1.  " Mobilize physical therapy weightbearing as tolerated.  Plan for home with home health care.  DVT risk: Lovenox history of cancer.        Adarsh Thorpe MD

## 2024-05-22 NOTE — DISCHARGE SUMMARY
Discharge Diagnosis  Arthritis of left knee    Issues Requiring Follow-Up  Left total knee replacement    Test Results Pending At Discharge  Pending Labs       No current pending labs.            Hospital Course   Patient brought to the operating room day of admission for left total knee replacement.  Uncomplicated surgery admitted to regular nursing floor.  Plan for home with home health care.    Pertinent Physical Exam At Time of Discharge  Physical Exam    Home Medications     Medication List      START taking these medications     acetaminophen 500 mg tablet; Commonly known as: Tylenol; Take 2 tablets   (1,000 mg) by mouth every 6 hours if needed for mild pain (1 - 3) for up   to 15 days.   docusate sodium 100 mg capsule; Commonly known as: Colace; Take 1   capsule (100 mg) by mouth 2 times a day for 15 days.   enoxaparin 40 mg/0.4 mL syringe; Commonly known as: Lovenox; Inject 0.4   mL (40 mg) under the skin once daily for 28 days.   oxyCODONE 5 mg immediate release tablet; Commonly known as: Roxicodone;   Take 1 tablet (5 mg) by mouth every 4 hours if needed for severe pain (7 -   10) for up to 7 days.     CHANGE how you take these medications     * ibuprofen 200 mg tablet; What changed: Another medication with the   same name was added. Make sure you understand how and when to take each.   * ibuprofen 600 mg tablet; Take 1 tablet (600 mg) by mouth every 6 hours   if needed for mild pain (1 - 3) for up to 5 days.; What changed: You were   already taking a medication with the same name, and this prescription was   added. Make sure you understand how and when to take each.   pravastatin 40 mg tablet; Commonly known as: Pravachol; TAKE 1 TABLET BY   MOUTH EVERY DAY; What changed: when to take this   sertraline 50 mg tablet; Commonly known as: Zoloft; Take 1 tablet (50   mg) by mouth once daily.; What changed: how much to take  * This list has 2 medication(s) that are the same as other medications   prescribed for  you. Read the directions carefully, and ask your doctor or   other care provider to review them with you.     CONTINUE taking these medications     atenolol 50 mg tablet; Commonly known as: Tenormin   calcium-vitamin D3-vitamin K 500-500-40 mg-unit-mcg chewable tablet;   Commonly known as: Viactiv   Fish OiL 1,000 mg (120 mg-180 mg) capsule; Generic drug: omega   3-dha-epa-fish oil   letrozole 2.5 mg tablet; Commonly known as: Femara   losartan-hydrochlorothiazide 100-12.5 mg tablet; Commonly known as:   Hyzaar; TAKE 1 TABLET BY MOUTH EVERY DAY   multivitamin tablet   NON FORMULARY   turmeric root extract 500 mg tablet     STOP taking these medications     chlorhexidine 0.12 % solution; Commonly known as: Peridex       Outpatient Follow-Up  Future Appointments   Date Time Provider Department Center   6/5/2024  8:30 AM H[facility]OR [facility] [place]    6/12/2024 10:30 AM Mike Abbasi PT [id number]   7/17/2024 10:00 AM Barak Hazel MD [id number] [facility]       Adarsh Thorpe MD

## 2024-05-22 NOTE — CARE PLAN
The patient's goals for the shift include      The clinical goals for the shift include pain control    Problem: Pain  Goal: My pain/discomfort is manageable  Outcome: Progressing     Problem: Safety  Goal: Patient will be injury free during hospitalization  Outcome: Progressing     Problem: Daily Care  Goal: Daily care needs are met  Outcome: Progressing     Problem: Psychosocial Needs  Goal: Demonstrates ability to cope with hospitalization/illness  Outcome: Progressing     Problem: Discharge Barriers  Goal: My discharge needs are met  Outcome: Progressing

## 2024-05-22 NOTE — PROGRESS NOTES
Occupational Therapy    Evaluation/Treatment    Patient Name: Leanne Mims  MRN: [id number]  : 1950  Today's Date: 24  Time Calculation  Start Time: 847  Stop Time: 930  Time Calculation (min): 43 min       Assessment:  OT Assessment: Pt presents on eval with generalized weakness after surgery, LLE pain/limitations, decreased activity tolerance, and impaired standing balance affecting her self-care and functional transfers/mobility. Pt will benefit from continued skilled OT to address these deficits.  Prognosis: Good  Evaluation/Treatment Tolerance: Patient limited by pain  End of Session Communication: Bedside nurse  End of Session Patient Position: Up in chair, Alarm off, not on at start of session (Needs in reach and pt is cognitively intact and aware of using the call light anytime she gets up.)  OT Assessment Results: Decreased ADL status, Decreased endurance, Decreased functional mobility, Decreased IADLs  Strengths: Support of Caregivers, Attitude of self    Plan:  Treatment Interventions: ADL retraining, Functional transfer training, Endurance training, Patient/family training, Equipment evaluation/education, Neuromuscular reeducation, Compensatory technique education  OT Frequency: 5 times per week  OT Discharge Recommendations: Low intensity level of continued care, Other (Comment) (Pt will have 2 friends acting as Caregivers at home as long as she needs.)  Equipment Recommended upon Discharge: Wheeled walker  OT Recommended Transfer Status: Minimal assist  OT - OK to Discharge: Yes      Subjective     General:   OT Received On: 24  General  Reason for Referral: s/p L TKR, impaired ADL's/mobility  Referred By: Adarsh Thorpe MD  Past Medical History Relevant to Rehab: anxiety, cataracts, meningioma, neuropathy, vertigo, breast CA, breast lumpectomy, depression  Family/Caregiver Present: No  Prior to Session Communication: Bedside nurse  Patient Position Received: Bed, 3 rail up,  Alarm off, not on at start of session  General Comment: Pt is a 72 yo female admitted to the hospital for an elective L TKR by Dr. Thorpe on 5/21/2024.    Precautions:  Hearing/Visual Limitations: wears glasses  LE Weight Bearing Status: Weight Bearing as Tolerated (LLE)  Medical Precautions: Fall precautions  Post-Surgical Precautions: Left total knee precautions    Pain:  Pain Assessment  Pain Assessment: 0-10  Pain Score: 6  Pain Type: Surgical pain, Acute pain  Pain Location: Knee  Pain Orientation: Left  Pain Interventions: Ambulation/increased activity    Objective     Cognition:  Overall Cognitive Status: Within Functional Limits  Orientation Level: Oriented X4  Cognition Comments: Pt appears to be anxious at times asking multiple questions and appearing unsure about certain things.    Home Living:  Type of Home: House  Lives With: Alone  Home Adaptive Equipment: Cane  Home Layout: Multi-level, Bed/bath upstairs, Stairs to alternate level with rails  Alternate Level Stairs-Rails:  (unilateral for 2 sets of stairs)  Alternate Level Stairs-Number of Steps: 4 down and 7 up  Home Access: Other (Comment) (threshold without a railing)  Bathroom Shower/Tub: Tub/shower unit  Bathroom Toilet: Handicapped height  Bathroom Equipment: Grab bars in shower    Prior Function:  Level of [place]: Independent with ADLs and functional transfers, Independent with homemaking with ambulation  ADL Assistance: Independent  Homemaking Assistance: Independent  Ambulatory Assistance: Independent (uses a cane PRN)  Vocational: Retired  Hand Dominance: Right    ADL:  Eating Assistance: Independent  Grooming Assistance: Minimal  Grooming Deficit: Steadying, Verbal cueing, Standing with assistive device  Bathing Assistance: Minimal  UE Dressing Assistance: Stand by  UE Dressing Deficit: Setup  LE Dressing Assistance: Moderate  LE Dressing Deficit: Don/doff L sock (due to LLE pain/limitations)  Toileting Assistance with Device:  Minimal  Toileting Deficit: Clothing management up, Clothing management down, Steadying (Pt completed her whole routine in the bathroom with steadying in standing for clothing management and indep seated on toilet for pericare.)    Activity Tolerance:  Activity Tolerance Comments: Fair    Bed Mobility/Transfers: Bed Mobility  Bed Mobility:  (Pt completed supine to sit in bed with min A for her LLE due to pain and limitations.)    Transfers  Transfer:  (Pt completed sit<>stand multiple times with min A to CGA for balance and verbal cues for hand placement and sequencing.)    Functional Mobility:  Functional Mobility  Functional Mobility Performed:  (Pt tolerated functional mobility to/from the bathroom using a RW with min A/CGA for balance and verbal cues for sequencing/safety.)    Standing Balance:  Static Standing Balance  Static Standing-Balance Support: Bilateral upper extremity supported  Static Standing-Level of Assistance: Minimum assistance, Contact guard    Therapy/Activity: Therapeutic Activity  Therapeutic Activity Performed:  (See bed mobility, toileting, transfers, functional mobility, and static standing balance. Also educated pt on knee precautions, car transfers, and tub transfers.)    Sensation:  Sensation Comment: pt denies paresthesias    Strength:  Strength Comments: Susan HARRELL    Coordination:  Coordination Comment: STANLEY's JULIO CESAR     Hand Function:  Hand Function  Gross Grasp: Functional  Coordination: Functional      Outcome Measures: [facility] Daily Activity  Putting on and taking off regular lower body clothing: A lot  Bathing (including washing, rinsing, drying): A little  Putting on and taking off regular upper body clothing: A little  Toileting, which includes using toilet, bedpan or urinal: A little  Taking care of personal grooming such as brushing teeth: A little  Eating Meals: None  Daily Activity - Total Score: 18      Education Documentation  Handouts, taught by Jovanni Rodriguez Jr., OT at  5/22/2024 11:47 AM.  Learner: Patient  Readiness: Acceptance  Method: Explanation  Response: Verbalizes Understanding    Precautions, taught by Jovanni Rodriguez Jr., OT at 5/22/2024 11:47 AM.  Learner: Patient  Readiness: Acceptance  Method: Explanation  Response: Verbalizes Understanding    ADL Training, taught by Jovanni Rodriguez Jr., OT at 5/22/2024 11:47 AM.  Learner: Patient  Readiness: Acceptance  Method: Explanation  Response: Verbalizes Understanding    Education Comments  No comments found.      Goals:  Encounter Problems       Encounter Problems (Active)       OT Goals       Pt will complete all functional transfers and mobility with mod indep using a RW. (Progressing)       Start:  05/22/24    Expected End:  06/19/24            Pt will tolerate functional mobility for a household distance using a RW with mod indep. (Progressing)       Start:  05/22/24    Expected End:  06/19/24            Pt will complete all ADL's with mod indep/indep using a device as needed. (Progressing)       Start:  05/22/24    Expected End:  06/19/24

## 2024-05-22 NOTE — PROGRESS NOTES
Physical Therapy    Physical Therapy Treatment    Patient Name: Leanne Mims  MRN: [id number]  Today's Date: 5/22/2024  Time Calculation  Start Time: 1305  Stop Time: 1329  Time Calculation (min): 24 min    Assessment/Plan   PT Assessment  PT Assessment Results: Decreased range of motion, Decreased strength, Decreased mobility, Impaired balance, Decreased safety awareness, Pain  Rehab Prognosis: Good  Strengths: Support of extended family/friends, Premorbid level of function  Barriers to Participation: Comorbidities, Housing layout  End of Session Communication: Bedside nurse  Assessment Comment: She made adequate progress toward her functional mobility goals. This afternoon, she required minimally increased assist during functional mobility (close S) compared to her baseline of independence. The pt would benefit from continued skilled PT services for maximizing independence and safety prior to & after discharge (LOW intensity).  End of Session Patient Position:  (seated EOB with call light and needs within reach)     PT Plan  Treatment/Interventions: Bed mobility, Gait training, Transfer training, Stair training, Strengthening, Range of motion, Therapeutic exercise, Therapeutic activity  PT Plan: Skilled PT  PT Frequency: BID  PT Discharge Recommendations: Low intensity level of continued care  Equipment Recommended upon Discharge: Wheeled walker  PT Recommended Transfer Status: Assist x1 (FWW)  PT - OK to Discharge: Yes      General Visit Information:   PT  Visit  PT Received On: 05/22/24  General  Reason for Referral: Impaired functional mobility. This 73 year old was admitted for an elective surgery. She was now s/p L TKA by Dr. Thorpe on 5/21/54.  Past Medical History Relevant to Rehab: anxiety, cataracts, meningioma, neuropathy, vertigo, breast CA s/p L breast lumpectomy  Prior to Session Communication: Bedside nurse  Patient Position Received: Bed, 3 rail up, Alarm off, not on at start of session  General  Comment: RN cleared pt for participation. She agreed to tx and participated fully.    Subjective   Precautions:  Precautions  Hearing/Visual Limitations: Hearing WFL, used reading glasses  LE Weight Bearing Status: Weight Bearing as Tolerated (LLE)  Medical Precautions: Fall precautions  Post-Surgical Precautions: Left total knee precautions    Objective   Pain:  Pain Assessment  Pain Assessment: 0-10  Pain Score: 4  Pain Type: Surgical pain  Pain Location: Knee  Pain Orientation: Left  Pain Interventions: Ambulation/increased activity    Postural Control:  Static Sitting Balance  Static Sitting-Balance Support: Feet supported, No upper extremity supported  Static Sitting-Level of Assistance: Close supervision  Dynamic Sitting Balance  Dynamic Sitting-Balance Support: Feet supported, No upper extremity supported  Dynamic Sitting-Balance:  (Upper body dressing while seated EOB)  Dynamic Sitting-Comments: Distant S  Static Standing Balance  Static Standing-Balance Support: Bilateral upper extremity supported  Static Standing-Level of Assistance: Close supervision  Dynamic Standing Balance  Dynamic Standing-Balance Support:  (unilateral UE support at FWW while she pulled up her underwear and pants to hip level)  Dynamic Standing-Comments:  (Close S)     Activity Tolerance:  Activity Tolerance  Endurance: Tolerates 10 - 20 min exercise with multiple rests    Treatments:  Therapeutic Exercise  Therapeutic Exercise Performed: Yes (In supine, bilat AP & QS x20 each; L hip/knee flex, LAQ x20 each. Seated EOB: L LAQ & knee flex x20 each.)    Therapeutic Activity  Therapeutic Activity Performed:  (Pt able to recall 1/3 total knee precautions prior to education. A FWW was issued and sized to pt's height. Instruction her in walker safety principles.)    Bed Mobility  Bed Mobility: Yes  Bed Mobility 1  Bed Mobility 1: Supine to sitting  Level of Assistance 1: Close supervision  Bed Mobility Comments 1:  (HOB minimally elevated  "and used R bed rail.)    Ambulation/Gait Training  Ambulation/Gait Training Performed: Yes  Ambulation/Gait Training 1  Surface 1: Level tile  Device 1: Rolling walker  Assistance 1: Close supervision  Comments/Distance (ft) 1: 180 ft using reciprocal pattern, slowed velocity & continuous movement. Steady throughout.    Transfers  Transfer: Yes  Transfer 1  Technique 1: Sit to stand, Stand to sit  Transfer Device 1: Walker  Transfer Level of Assistance 1: Close supervision  Trials/Comments 1: x1 trial at EOB    Stairs  Stairs: Yes  Stairs  Rails 1: Right (ascending)  Device 1: Single point cane  Assistance 1: Contact guard  Comment/Number of Steps 1: 9 stairs using step-to-step technique with sequencing to protect LLE. Slowed pacing and steady throughout. Pt declined to practice stairs without using rail stating her threshold step at home was not \"very high.\"    Outcome Measures:  [facility] Basic Mobility  Turning from your back to your side while in a flat bed without using bedrails: None  Moving from lying on your back to sitting on the side of a flat bed without using bedrails: None  Moving to and from bed to chair (including a wheelchair): A little  Standing up from a chair using your arms (e.g. wheelchair or bedside chair): A little  To walk in hospital room: A little  Climbing 3-5 steps with railing: A little  Basic Mobility - Total Score: 20    Education Documentation  Precautions, taught by Kayla Bright PT at 5/22/2024  3:21 PM.  Learner: Patient  Readiness: Acceptance  Method: Explanation  Response: Verbalizes Understanding, Needs Reinforcement, Demonstrated Understanding    Home Exercise Program, taught by Kayla Bright PT at 5/22/2024  3:21 PM.  Learner: Patient  Readiness: Acceptance  Method: Explanation  Response: Verbalizes Understanding, Needs Reinforcement, Demonstrated Understanding    Mobility Training, taught by Kayla Bright PT at 5/22/2024  3:21 PM.  Learner: Patient  Readiness: " Acceptance  Method: Explanation  Response: Verbalizes Understanding, Needs Reinforcement, Demonstrated Understanding    Precautions, taught by Kayla Bright PT at 5/22/2024 12:10 PM.  Learner: Patient  Readiness: Acceptance  Method: Explanation  Response: Verbalizes Understanding, Demonstrated Understanding, Needs Reinforcement    Home Exercise Program, taught by Kayla Bright PT at 5/22/2024 12:10 PM.  Learner: Patient  Readiness: Acceptance  Method: Explanation  Response: Verbalizes Understanding, Demonstrated Understanding, Needs Reinforcement    Mobility Training, taught by Kayla Brgiht, PT at 5/22/2024 12:10 PM.  Learner: Patient  Readiness: Acceptance  Method: Explanation  Response: Verbalizes Understanding, Demonstrated Understanding, Needs Reinforcement    Education Comments  No comments found.        OP EDUCATION:       Encounter Problems       Encounter Problems (Resolved)       Functional Mobility       Pt will transition supine<>sit with mod I. (Adequate for Discharge)       Start:  05/22/24    Expected End:  05/25/24    Resolved:  05/22/24         Pt will transfer sit<>stand with FWW & mod I. (Adequate for Discharge)       Start:  05/22/24    Expected End:  05/25/24    Resolved:  05/22/24         Pt will ambulate >/=150 ft with FWW & mod I. (Adequate for Discharge)       Start:  05/22/24    Expected End:  05/25/24    Resolved:  05/22/24         Pt will negotiate 1 stair with straight cane and Romie x1. (Adequate for Discharge)       Start:  05/22/24    Expected End:  05/25/24    Resolved:  05/22/24         Pt will negotiate 7 stairs with unilateral rail, straight cane & close S. (Adequate for Discharge)       Start:  05/22/24    Expected End:  05/25/24    Resolved:  05/22/24

## 2024-05-22 NOTE — PROGRESS NOTES
05/22/24 1415   Discharge Planning   Patient expects to be discharged to: Home with [facility], SOC confirmed for 5/23 bedside nurse updated     Bedside nurse updated that [facility] arrangements are secure for discharge

## 2024-05-22 NOTE — PROGRESS NOTES
Leanne Mims is a 73 y.o. female on day 0 of admission presenting with Arthritis of left knee.      Subjective   Patient seen and examined.  POD #1 left total knee replacement.  Denies acute events overnight.  Denies chest pain, shortness of breath, nausea, vomiting, chills.  Reports mild postoperative pain to her left lower extremity.  States she would like to go home today.       Objective     Last Recorded Vitals  /69 (BP Location: Right arm, Patient Position: Lying)   Pulse 62   Temp 36.5 °C (97.7 °F) (Oral)   Resp 15   Wt 84.4 kg (186 lb)   SpO2 94%   Intake/Output last 3 Shifts:    Intake/Output Summary (Last 24 hours) at 5/22/2024 1228  Last data filed at 5/22/2024 1158  Gross per 24 hour   Intake 4611.83 ml   Output 1075 ml   Net 3536.83 ml       Admission Weight  Weight: 84.4 kg (186 lb) (05/21/24 1749)    Daily Weight  05/21/24 : 84.4 kg (186 lb)    Image Results  XR knee left 1-2 views  Narrative: Interpreted By:  Dimas Robert,   STUDY:  XR KNEE LEFT 1-2 VIEWS; 5/21/2024 1:15 pm      INDICATION:  Signs/Symptoms:Post-op knee.      COMPARISON:  09/11/2020      ACCESSION NUMBER(S):  [id number]      ORDERING CLINICIAN:  TILA KNOTT      TECHNIQUE:  Left knee portable two views      FINDINGS:  No fractures or destructive lesions are identified. There is a total  left knee prosthesis present with surgical drain and soft tissue air  identified.      Impression: Status post left knee arthroplasty.      MACRO:  none      Signed by: Dimas Robert 5/21/2024 2:24 PM  Dictation workstation:   [id number]      Physical Exam  Vitals and nursing note reviewed.   Constitutional:       Appearance: Normal appearance.   HENT:      Head: Normocephalic and atraumatic.      Mouth/Throat:      Mouth: Mucous membranes are moist.   Eyes:      Extraocular Movements: Extraocular movements intact.      Pupils: Pupils are equal, round, and reactive to light.   Cardiovascular:      Rate and Rhythm: Normal rate.       Pulses: Normal pulses.      Heart sounds: Normal heart sounds.   Pulmonary:      Effort: Pulmonary effort is normal.      Breath sounds: Normal breath sounds.   Abdominal:      General: Bowel sounds are normal.      Palpations: Abdomen is soft.   Musculoskeletal:         General: Tenderness present.      Cervical back: Normal range of motion and neck supple.      Comments: ROM left lower extremity slightly limited due to post operative pain   Skin:     General: Skin is warm and dry.      Capillary Refill: Capillary refill takes less than 2 seconds.      Comments: Left knee dressing clean dry intact, hemovac drain has been removed   Neurological:      General: No focal deficit present.      Mental Status: She is alert.   Psychiatric:         Mood and Affect: Mood normal.         Relevant Results               Assessment/Plan       Principal Problem:    Arthritis of left knee    POD #1 Left total knee arthroplasty  As managed by primary service  PRN pain management regimen  DVT prophylaxis  PT/OT  Incentive spirometer instruction  Falls precautions     HTN  Normotensive currently, hold home losartan-hydrochlorothiazide  Continue home atenolol  Monitor vital signs     HLD  Continue home statin     Elevated blood glucose  Pre-diabetic  Hemoglobin A1c 6.2 5/15/24  Patient does not take anything for this at home   Will order BID blood sugar checks, advise patient to follow up with PCP regarding this     Hx of Breast cancer, s/p left lumpectomy  Limit venipuncture, vital signs to RUE only   Continue home letrozole     Hx Depression  Continue home sertraline     DVT/GI  Lovenox, H2 blocker      5/22/24: As above.  No acute events overnight.  Has mobilized with therapy.  On room air.  Medically stable at this time.  Will sign off.  Thank you for this consult.            Isabela Gibson, APRN-CNP

## 2024-05-22 NOTE — CARE PLAN
The patient's goals for the shift include  rest    The clinical goals for the shift include pain management

## 2024-05-22 NOTE — PROGRESS NOTES
05/22/24 0845   Discharge Planning   Living Arrangements Alone   Support Systems Friends/neighbors   Assistance Needed Patient is oriented and does not use assistive devices at baseline   Type of Residence Private residence   Number of Stairs to Enter Residence 1   Number of Stairs Within Residence 8   Do you have animals or pets at home? Yes   Type of Animals or Pets dog   Who is requesting discharge planning? Patient   Home or Post Acute Services In home services   Type of Home Care Services Home nursing visits;Home OT;Home PT   Patient expects to be discharged to: Attempted to call into patient room and no answer. Patient plan is to D/C home with [facility] (Internal referral) once medically clear.   Does the patient need discharge transport arranged? No   Patient Choice   Provider Choice list and CMS website (https://medicare.gov/care-compare#search) for post-acute Quality and Resource Measure Data were provided and reviewed with: Patient   Patient / Family choosing to utilize agency / facility established prior to hospitalization No

## 2024-05-23 ENCOUNTER — HOME CARE VISIT (OUTPATIENT)
Dept: HOME HEALTH SERVICES | Facility: HOME HEALTH | Age: 74
End: 2024-05-23
Payer: MEDICARE

## 2024-05-23 PROCEDURE — 1090000002 HH PPS REVENUE DEBIT

## 2024-05-23 PROCEDURE — 169592 NO-PAY CLAIM PROCEDURE

## 2024-05-23 PROCEDURE — G0151 HHCP-SERV OF PT,EA 15 MIN: HCPCS | Mod: HHH

## 2024-05-23 PROCEDURE — 0023 HH SOC

## 2024-05-23 PROCEDURE — 1090000001 HH PPS REVENUE CREDIT

## 2024-05-23 ASSESSMENT — ACTIVITIES OF DAILY LIVING (ADL)
ENTERING_EXITING_HOME: MINIMUM ASSIST
OASIS_M1830: 03
TRANSPORTATION ASSESSED: 1
AMBULATION ASSISTANCE: 1
TRANSPORTATION: NEEDS ASSISTANCE
AMBULATION ASSISTANCE: MINIMUM ASSIST

## 2024-05-23 ASSESSMENT — ENCOUNTER SYMPTOMS
SUBJECTIVE PAIN PROGRESSION: GRADUALLY IMPROVING
PAIN LOCATION - PAIN QUALITY: ACHING
PAIN SEVERITY GOAL: 0/10
PAIN: 1
PAIN LOCATION: LEFT KNEE
PERSON REPORTING PAIN: PATIENT
PAIN LOCATION - PAIN SEVERITY: 1/10
HIGHEST PAIN SEVERITY IN PAST 24 HOURS: 7/10
LOWEST PAIN SEVERITY IN PAST 24 HOURS: 1/10
PAIN LOCATION - EXACERBATING FACTORS: UPRIGHT ACTIVITY

## 2024-05-23 NOTE — HOME HEALTH
5 21 24.  Dr. Thorpe.  RTC 6 17 24. Bandage to be removed in about one week.  WBAT.    AROM zero to 106 degrees.  TUG 27 seconds    Patient given written copy of recommended supine exercises of  1.  Ankle pumps  2.  Quad sets  3.  Gluteal sets  4.  Heel slides  5.  Hip abduction  6.  Straight Leg Raises  7.  Short arc quads       for up to 20 repetitions, 3 sessions per day.   Patient performed these while reclined in her recliner.  She is aware that my recommendation is for her to perform these while lying on a bed, a more favorable location for performace of exercises.

## 2024-05-24 ENCOUNTER — HOME CARE VISIT (OUTPATIENT)
Dept: HOME HEALTH SERVICES | Facility: HOME HEALTH | Age: 74
End: 2024-05-24
Payer: MEDICARE

## 2024-05-24 PROCEDURE — 1090000001 HH PPS REVENUE CREDIT

## 2024-05-24 PROCEDURE — 1090000002 HH PPS REVENUE DEBIT

## 2024-05-25 PROCEDURE — 1090000002 HH PPS REVENUE DEBIT

## 2024-05-25 PROCEDURE — 1090000001 HH PPS REVENUE CREDIT

## 2024-05-26 PROCEDURE — 1090000002 HH PPS REVENUE DEBIT

## 2024-05-26 PROCEDURE — 1090000001 HH PPS REVENUE CREDIT

## 2024-05-27 PROCEDURE — 1090000002 HH PPS REVENUE DEBIT

## 2024-05-27 PROCEDURE — 1090000001 HH PPS REVENUE CREDIT

## 2024-05-28 ENCOUNTER — HOME CARE VISIT (OUTPATIENT)
Dept: HOME HEALTH SERVICES | Facility: HOME HEALTH | Age: 74
End: 2024-05-28
Payer: MEDICARE

## 2024-05-28 VITALS — SYSTOLIC BLOOD PRESSURE: 134 MMHG | RESPIRATION RATE: 18 BRPM | DIASTOLIC BLOOD PRESSURE: 78 MMHG

## 2024-05-28 PROCEDURE — 1090000001 HH PPS REVENUE CREDIT

## 2024-05-28 PROCEDURE — 1090000002 HH PPS REVENUE DEBIT

## 2024-05-28 PROCEDURE — G0157 HHC PT ASSISTANT EA 15: HCPCS | Mod: CQ,HHH

## 2024-05-29 PROCEDURE — 1090000001 HH PPS REVENUE CREDIT

## 2024-05-29 PROCEDURE — 1090000002 HH PPS REVENUE DEBIT

## 2024-05-30 ENCOUNTER — HOME CARE VISIT (OUTPATIENT)
Dept: HOME HEALTH SERVICES | Facility: HOME HEALTH | Age: 74
End: 2024-05-30
Payer: MEDICARE

## 2024-05-30 VITALS — SYSTOLIC BLOOD PRESSURE: 122 MMHG | HEART RATE: 60 BPM | DIASTOLIC BLOOD PRESSURE: 70 MMHG | RESPIRATION RATE: 16 BRPM

## 2024-05-30 PROCEDURE — 1090000002 HH PPS REVENUE DEBIT

## 2024-05-30 PROCEDURE — 1090000001 HH PPS REVENUE CREDIT

## 2024-05-30 PROCEDURE — G0157 HHC PT ASSISTANT EA 15: HCPCS | Mod: CQ,HHH

## 2024-05-30 ASSESSMENT — ENCOUNTER SYMPTOMS
LOWEST PAIN SEVERITY IN PAST 24 HOURS: 5/10
HIGHEST PAIN SEVERITY IN PAST 24 HOURS: 6/10
PERSON REPORTING PAIN: PATIENT
PAIN: 1

## 2024-05-31 PROCEDURE — 1090000001 HH PPS REVENUE CREDIT

## 2024-05-31 PROCEDURE — 1090000002 HH PPS REVENUE DEBIT

## 2024-06-01 PROCEDURE — 1090000002 HH PPS REVENUE DEBIT

## 2024-06-01 PROCEDURE — 1090000001 HH PPS REVENUE CREDIT

## 2024-06-02 PROCEDURE — 1090000001 HH PPS REVENUE CREDIT

## 2024-06-02 PROCEDURE — 1090000002 HH PPS REVENUE DEBIT

## 2024-06-03 ENCOUNTER — HOME CARE VISIT (OUTPATIENT)
Dept: HOME HEALTH SERVICES | Facility: HOME HEALTH | Age: 74
End: 2024-06-03
Payer: MEDICARE

## 2024-06-03 VITALS
OXYGEN SATURATION: 98 % | RESPIRATION RATE: 16 BRPM | HEART RATE: 68 BPM | DIASTOLIC BLOOD PRESSURE: 64 MMHG | SYSTOLIC BLOOD PRESSURE: 100 MMHG

## 2024-06-03 PROCEDURE — 1090000002 HH PPS REVENUE DEBIT

## 2024-06-03 PROCEDURE — G0157 HHC PT ASSISTANT EA 15: HCPCS | Mod: CQ,HHH

## 2024-06-03 PROCEDURE — 1090000001 HH PPS REVENUE CREDIT

## 2024-06-03 ASSESSMENT — ENCOUNTER SYMPTOMS
PAIN: 1
LOWEST PAIN SEVERITY IN PAST 24 HOURS: 1/10
HIGHEST PAIN SEVERITY IN PAST 24 HOURS: 5/10
PERSON REPORTING PAIN: PATIENT

## 2024-06-04 PROCEDURE — 1090000002 HH PPS REVENUE DEBIT

## 2024-06-04 PROCEDURE — 1090000001 HH PPS REVENUE CREDIT

## 2024-06-05 ENCOUNTER — APPOINTMENT (OUTPATIENT)
Dept: RADIOLOGY | Facility: CLINIC | Age: 74
End: 2024-06-05
Payer: MEDICARE

## 2024-06-05 PROCEDURE — 1090000001 HH PPS REVENUE CREDIT

## 2024-06-05 PROCEDURE — 1090000002 HH PPS REVENUE DEBIT

## 2024-06-06 ENCOUNTER — HOME CARE VISIT (OUTPATIENT)
Dept: HOME HEALTH SERVICES | Facility: HOME HEALTH | Age: 74
End: 2024-06-06
Payer: MEDICARE

## 2024-06-06 PROCEDURE — 1090000001 HH PPS REVENUE CREDIT

## 2024-06-06 PROCEDURE — 1090000002 HH PPS REVENUE DEBIT

## 2024-06-06 PROCEDURE — G0151 HHCP-SERV OF PT,EA 15 MIN: HCPCS | Mod: HHH

## 2024-06-06 ASSESSMENT — ACTIVITIES OF DAILY LIVING (ADL)
HOME_HEALTH_OASIS: 00
TRANSPORTATION ASSESSED: 1
AMBULATION ASSISTANCE: 1
TRANSPORTATION: NEEDS ASSISTANCE
AMBULATION ASSISTANCE: INDEPENDENT
OASIS_M1830: 00

## 2024-06-06 ASSESSMENT — ENCOUNTER SYMPTOMS
PAIN LOCATION - PAIN QUALITY: ACHING
PAIN: 1
PAIN LOCATION - PAIN SEVERITY: 0/10
SUBJECTIVE PAIN PROGRESSION: GRADUALLY IMPROVING
HIGHEST PAIN SEVERITY IN PAST 24 HOURS: 5/10
PAIN LOCATION: LEFT KNEE
LOWEST PAIN SEVERITY IN PAST 24 HOURS: 0/10
PAIN SEVERITY GOAL: 0/10

## 2024-06-06 NOTE — HOME HEALTH
Using a cane now regularly.  Short trips around the house with the cane.  15 days post op today.    5 21 24. Dr. Thorpe. RTC 6 17 24. Bandage to be removed in about one week. WBAT.     AROM minus to 118 degrees.   TUG 9 seconds.    Reviewed written copy of recommended supine exercises of   1. Ankle pumps   2. Quad sets   3. Gluteal sets   4. Heel slides   5. Hip abduction   6. Straight Leg Raises   7. Short arc quads for up to 20 repetitions, 3 sessions per day.   Also seated and standing exercises reviewed today.          Patient performed these while reclined in her recliner. She is aware that my recommendation is for her to perform these while lying on a bed, a more favorable location for performace of exercises.

## 2024-06-11 NOTE — PROGRESS NOTES
Physical Therapy Evaluation    Patient Name: Leanne Mims  MRN: [id number]  Evaluation Date: 6/12/2024  Time Calculation  Start Time: 1035  Stop Time: 1115  Time Calculation (min): 40 min  PT Evaluation Time Entry  PT Evaluation (Moderate) Time Entry: 25        Insurance Information:    Problem List Items Addressed This Visit    None  Visit Diagnoses         Codes    S/P total knee arthroplasty, left    -  Primary Z96.652    Relevant Orders    Follow Up In Physical Therapy              Subjective   General:  Patient is a 74 yo female with s/p left TKA 5/21/2024.  Patient with good recovery thus far.  Patient recently discharged from home PT.  Patient independent with HEP.  Patient recently transitioned to cane form walker. Patient motivated to participate.         Surgery:   S/p left TKA 5/21    Precautions:  vertigo    Relevant PMH:  Vertigo  HTN  Breast CA  Anxiety  Neuropathy  meningiaoma      Pain:  Left knee  Worse- PM  5/10    Better- at rest  0/10     Home Living:       Home type: House  Stairs: Yes- independent  Lives with: Spouse  Occupation: retired  Hobbies/activities: walking dog, gardening    Prior Function Per Pt/Caregiver Report:  Limited secondary    Imaging:  Po/x-ray 5/21  Satisfactory    Objective   Posture:     Range of Motion:  Knee AROM L R   Flexion 115 deg 140 deg   Extension 0 deg 0 deg         Strength:  Knee MMT L R   Knee Flexion 4/5 4+/5   Knee Extension 4/5 4+/5         Flexibility:  HS/quad ++    Palpation:  Modest tenderness left knee      Incission:  Clean and dry  Glue evident  Circumferential mm  Join line  39 cm       Gait:  Using straight cane     Stairs  Up and down with step to pattern 8 steps with cane/rail    Transfers:  independent    Outcome Measures:  WOMAC  40% impaired    Assessment   Pt is a 73 y.o. female who presents with impairments of limited knee ROM/weakness and altered gait. These impairments have led to functional limitations including difficulty with  ADLs/recreation. Pt would benefit from skilled physical therapy intervention to improve above impairments and facilitate return to function.    Plan  2x/week  Up to 16 visits  Therapeutic exercise  manual    Plan for next visit:   Advance PRES as able  Add manual as appropriate    OP EDUCATION:  HEP       Today's Treatment:  Therapeutic Exercise  HEP to be completed daily, exercises include:  Continue with HEP per home PT  Verbal review    Goals:  STG(3 visits)   Patient to be independent with HEP    LTG(16 visits)   WOMAC to < 20 % impaired   PROM left knee 0-120   MMT left quad/HS 4+/5   Patient to walk for 30 minutes without need to sit

## 2024-06-12 ENCOUNTER — EVALUATION (OUTPATIENT)
Dept: PHYSICAL THERAPY | Facility: CLINIC | Age: 74
End: 2024-06-12
Payer: MEDICARE

## 2024-06-12 DIAGNOSIS — Z96.652 S/P TOTAL KNEE ARTHROPLASTY, LEFT: Primary | ICD-10-CM

## 2024-06-12 PROCEDURE — 97162 PT EVAL MOD COMPLEX 30 MIN: CPT | Mod: GP

## 2024-06-17 NOTE — PROGRESS NOTES
"    Physical Therapy Treatment    Patient Name: Leanne Mims  MRN: [id number]  Encounter date:  6/19/2024  Time Calculation  Start Time: 1319  Stop Time: 1402  Time Calculation (min): 40 min  PT Therapeutic procedures time entry:  Therapeutic Exercise Time entry: 30 min  Manual : 10 minutes     Visit Number:  2 (including evaluation)  Planned total visits: 15  Visits Authorized/Insurance Coverage:  MEDICARE A&B, [place], [place], NO AUTH ( $74.32 USED PT/ST)     Current Problem  Problem List Items Addressed This Visit    None  Visit Diagnoses         Codes    S/P total knee arthroplasty, left     Z96.652            Surgery:   S/p left TKA 5/21     Precautions:  Vertigo     Relevant PMH:  Vertigo  HTN  Breast CA  Anxiety  Neuropathy  meningiaoma     Pain  1/10    Subjective  General    Patient states her pain can increase to 3-4/10 during the day and is normally achy with anterior tightness at the end of the day.     Objective      L knee PROM: 0-120 deg. Patient ambulated into appointment, Mod I with SPC, minimal limp on LLE.    Treatment:  Manual: Retro STM to anterior L knee, pt supine, BLE over bolster, for edema control and muscle stiffness       Therapeutic exercises: x 10  NuStep x 5', level 1, seat 10    Supine:   Quad sets x 20, 3\" holds  SLR 2 x 10, 3\" hold at top  Hooklying: adduction, 2 x 10, green                     abduction, 2 x 10  Ankle pumps/heel raises  Heel slides 2 x 10    Standing: at rail  Step ups  x 10 L/R, 4\" step  Lateral Step ups  x 10 L/R, 4\" step    Current HEP:  PER home PT    Activity tolerance:  Good    OP EDUCATION:    Assessment:       NuStep introduced to improve BLE ROM and strength, slow pace, good response. Minimal verbal and visual cueing required for most therex due to inclusion on HEP, good follow through. Patient uncertain of ability to step up with LLE due to favoring RLE at home, good response to forward and lateral step ups to 4\" step w/ BLE. Decreased L knee pain following " treatment.    Pt's response to treatment:  Good  Areas of improvements: Decreased L knee stiffness and pain following STM and therex  Limitations/deficits: Requires step to gait pattern ascending and descending stairs, increased L knee aching and tightness at end of day and after ADLs     Pain end of session: 0.5/10    Plan:    Continue with current POC/no changes    Assessment of current progress against goals:  Progressing toward functional goals and Symptomatic relief with treatment    Goals:  STG(3 visits)   Patient to be independent with HEP     LTG(16 visits)   WOMAC to < 20 % impaired   PROM left knee 0-120   MMT left quad/HS 4+/5   Patient to walk for 30 minutes without need to sit

## 2024-06-19 ENCOUNTER — TREATMENT (OUTPATIENT)
Dept: PHYSICAL THERAPY | Facility: CLINIC | Age: 74
End: 2024-06-19
Payer: MEDICARE

## 2024-06-19 DIAGNOSIS — Z96.652 S/P TOTAL KNEE ARTHROPLASTY, LEFT: ICD-10-CM

## 2024-06-19 PROCEDURE — 97140 MANUAL THERAPY 1/> REGIONS: CPT | Mod: GP | Performed by: SPECIALIST/TECHNOLOGIST

## 2024-06-19 PROCEDURE — 97110 THERAPEUTIC EXERCISES: CPT | Mod: GP | Performed by: SPECIALIST/TECHNOLOGIST

## 2024-06-20 NOTE — PROGRESS NOTES
"    Physical Therapy Treatment    Patient Name: Leanne Mims  MRN: [id number]  Encounter date:  6/21/2024  Time Calculation  Start Time: 1115  Stop Time: 1205  Time Calculation (min): 50 min     PT Therapeutic Procedures Time Entry  Manual Therapy Time Entry: 15  Therapeutic Exercise Time Entry: 25    Visit Number:  3 (including evaluation)  Visits Authorized/Insurance Coverage:  MEDICARE A&B, [place], [place], [place] ( $74.32 USED PT/ST)     Current Problem  Problem List Items Addressed This Visit    None  Visit Diagnoses         Codes    S/P total knee arthroplasty, left     Z96.652             Surgery  s/p left TKA     Surgery date:  5/21/2024     Date:  06/18/2024 4 weeks post surgery    Precautions  Precautions  Post-Surgical Precautions: Left total knee precautions  vertigo     Pain  Pain Assessment: 0-10  0-10 (Numeric) Pain Score: 1 (Past week 1-6)  Pain Type: Surgical pain  Pain Location: Knee  Response to Interventions: Post treatment 3    Subjective  General            Objective  ROM knee flexion 122  Post manual and AROM knee flexion 128 deg  Knee extension 0   Edema well controlled  Incision healing    Treatment:  Therapeutic Exercise  Therapeutic Exercise Performed: Yes  NuStep x 5', level 1, seat 10     Supine:   Quad sets x 20, 3\" holds  SLR 20x, 3s hold  DKC, yellow SB 2x15    Seated HR/TR (pt states she can't perform heel raises in standing because left ankle hurts and is swollen)    Trialed various positions for heel raises.  Ended up performing offset with single leg 15x  Toe raises 15 x    Standing 3 way hip, and HS curl 15x each (pt to had SLR flexion to her home program)    Pt ambulated around the clinic post RM to work through new flexibility achieved during the session.  Pt educated in now to achieve more neutral foot position on left.    DNP but reviewed    Hooklying: adduction, 2 x 10, green                     abduction, 2 x 10  Ankle pumps/heel raises  Heel slides 2 x 10     Standing: at " "rail  Step ups  x 10 L/R, 4\" step  Lateral Step ups  x 10 L/R, 4\" step  Billed Treatment Times:  Therapeutic Exercise 25 min    Current HEP:  HEP from home for ROM, isometric and AROM in supine/H/L, seated and standing       Manual:    Pt supine:  MFR/DTM (mid quad), STM/MFR med/lat/anterior L knee, to release scar tissue, improve flexibility and decrease min edema    Billed Treatment Times:  Manual Therapy  15 min    OP EDUCATION:  Outpatient Education  Individual(s) Educated: Patient  Education Provided: Home Exercise Program, Body Mechanics, Anatomy, Physiology, POC, Post-Op Precautions  Education Comment: Extensive pt education throughout on healing process, role of edema, scar tissue, natural healing process and antomical position of the knee and ankle    Assessment:  PT Assessment  Assessment Comment: Pt performed well.  Improved AROM post treatment  Areas of improvements:  AROM, PROM, Decreased pain, Improved stability, Improved strength, Improved gait pattern, and Improved ability to transfer  Limitations/deficits:  Weakness and pain and edema    Plan:     Continue with current POC/no changes    Assessment of current progress against goals:  Progressing toward functional goals    Goals:                "

## 2024-06-21 ENCOUNTER — TREATMENT (OUTPATIENT)
Dept: PHYSICAL THERAPY | Facility: CLINIC | Age: 74
End: 2024-06-21
Payer: MEDICARE

## 2024-06-21 DIAGNOSIS — Z96.652 S/P TOTAL KNEE ARTHROPLASTY, LEFT: ICD-10-CM

## 2024-06-21 PROCEDURE — 97140 MANUAL THERAPY 1/> REGIONS: CPT | Mod: GP | Performed by: PHYSICAL THERAPIST

## 2024-06-21 PROCEDURE — 97110 THERAPEUTIC EXERCISES: CPT | Mod: GP | Performed by: PHYSICAL THERAPIST

## 2024-06-21 ASSESSMENT — PAIN SCALES - GENERAL: PAINLEVEL_OUTOF10: 1

## 2024-06-21 ASSESSMENT — PAIN - FUNCTIONAL ASSESSMENT: PAIN_FUNCTIONAL_ASSESSMENT: 0-10

## 2024-06-28 ENCOUNTER — HOSPITAL ENCOUNTER (OUTPATIENT)
Dept: RADIOLOGY | Facility: CLINIC | Age: 74
Discharge: HOME | End: 2024-06-28
Payer: MEDICARE

## 2024-06-28 DIAGNOSIS — G93.89 BRAIN MASS: ICD-10-CM

## 2024-06-28 PROCEDURE — 70553 MRI BRAIN STEM W/O & W/DYE: CPT

## 2024-06-28 PROCEDURE — 2500000004 HC RX 250 GENERAL PHARMACY W/ HCPCS (ALT 636 FOR OP/ED): Performed by: NEUROLOGICAL SURGERY

## 2024-06-28 PROCEDURE — A9575 INJ GADOTERATE MEGLUMI 0.1ML: HCPCS | Performed by: NEUROLOGICAL SURGERY

## 2024-06-28 RX ORDER — GADOTERATE MEGLUMINE 376.9 MG/ML
17 INJECTION INTRAVENOUS
Status: COMPLETED | OUTPATIENT
Start: 2024-06-28 | End: 2024-06-28

## 2024-07-01 ENCOUNTER — TREATMENT (OUTPATIENT)
Dept: PHYSICAL THERAPY | Facility: CLINIC | Age: 74
End: 2024-07-01
Payer: MEDICARE

## 2024-07-01 DIAGNOSIS — Z96.652 S/P TOTAL KNEE ARTHROPLASTY, LEFT: ICD-10-CM

## 2024-07-01 PROCEDURE — 97140 MANUAL THERAPY 1/> REGIONS: CPT | Mod: GP,CQ

## 2024-07-01 PROCEDURE — 97110 THERAPEUTIC EXERCISES: CPT | Mod: GP,CQ

## 2024-07-01 ASSESSMENT — PAIN - FUNCTIONAL ASSESSMENT
PAIN_FUNCTIONAL_ASSESSMENT: 0-10
PAIN_FUNCTIONAL_ASSESSMENT: 0-10

## 2024-07-01 ASSESSMENT — PAIN SCALES - GENERAL
PAINLEVEL_OUTOF10: 2
PAINLEVEL_OUTOF10: 1

## 2024-07-01 NOTE — PROGRESS NOTES
"    Physical Therapy Treatment    Patient Name: Leanne Mims  MRN: [id number]  Encounter date:  7/1/2024  Time Calculation  Start Time: 1015  Stop Time: 1103  Time Calculation (min): 48 min     PT Therapeutic Procedures Time Entry  Manual Therapy Time Entry: 9  Therapeutic Exercise Time Entry: 33    Visit Number:  4 (including evaluation)  Planned total visits: 2x wk up to 16  Visit Authorized/insurance coverage:  MEDICARE A&B[place], [place] ( $74.32 USED PT/ST)       Current Problem  Problem List Items Addressed This Visit    None  Visit Diagnoses         Codes    S/P total knee arthroplasty, left     Z96.652             Surgery  S/p left TKA 5/21 07/01/2024 5 weeks post surgery     Precautions   Vertigo    Pain  Pain Assessment: 0-10  0-10 (Numeric) Pain Score: 1  Pain Location: Knee  Pain Orientation: Left    Subjective  General  General Comment: Patient reports that her knee feels more tight than painful.       Objective  L knee 0-120 degrees     Treatment:  Therapeutic Exercise  Therapeutic Exercise Performed: Yes    NuStep x 5', level 1, seat 10  Std bike Seat 26 x5 min full revolution      Supine:   Quad sets x 20, 3\" holds  SLR 20x, 3s hold  SLR with ER 3\" x5  DKC, yellow SB 2x15    Seated:  Trialed various positions for heel raises.  Ended up performing offset with single leg 15x  Seated Toe raises 15 x    Standing: at rail  Hip Extension x10 L/R  Hip SLR x10 L/R  Hip Abd x10 L/R  Step ups  x 10 L/R, 4\" step  Lateral Step ups  x 10 L/R, 4\" step  Billed Treatment Times:  Therapeutic Exercise 33 min    Current HEP:  Hip Extension x10 L/R  Hip SLR x10 L/R  Hip Abd x10 L/R  Step ups  x 10 L/R, 4\" step  Lateral Step ups  x 10 L/R, 4\" step    Manual Therapy  Manual Therapy Performed: Yes  Manual:    Pt supine:  MFR/DTM medial/lateral posterior L knee,  Billed Treatment Times:  Manual Therapy  9 min        Assessment:  PT Assessment  Assessment Comment: Patient is making good progress.  Reviewed HEP " exercises and removed some from home glenis care activity.  Added std bike with full revolutions as well as additional standing exercises without increased sx.  Areas of improvements:  AROM and Decreased pain  Limitations/deficits:  Weakness and L knee tightness    Pain end of session:  o    Plan:     Continue with current POC/no changes    Assessment of current progress against goals:  Progressing toward functional goals    Goals:  STG(3 visits)   Patient to be independent with HEP     LTG(16 visits)   WOMAC to < 20 % impaired   PROM left knee 0-120   MMT left quad/HS 4+/5   Patient to walk for 30 minutes without need to sit

## 2024-07-02 NOTE — PROGRESS NOTES
"    Physical Therapy Treatment    Patient Name: Leanne Mims  MRN: [id number]  Encounter date:  7/3/2024  Time Calculation  Start Time: 1720  Stop Time: 1810  Time Calculation (min): 50 min     PT Therapeutic Procedures Time Entry  Manual Therapy Time Entry: 10  Therapeutic Exercise Time Entry: 35    Visit Number:  5 (including evaluation)  Planned total visits: 16  Visits Authorized/Insurance Coverage:  [place]ICARE A&B[place], [place] ( $74.32 USED PT/ST)     Current Problem  Problem List Items Addressed This Visit    None  Visit Diagnoses         Codes    S/P total knee arthroplasty, left    -  Primary Z96.652               Surgery  S/p left TKA 5/21 07/01/2024 5 weeks post surgery      Precautions  Vertigo       Pain  No pain  \"Tightness\"    Subjective  General  Patient pleases with progress post TKA with exception of \"tightness\" quad    Objective  PROM  0-127    Modest vaulting left LE    Treatment:    Manual:  Supine over bolster  STM left quad    Therapeutic Exercise  Std bike Seat 26 x 5:21 1 mile min full revolution     Supine:   HS stretch  SLR 2 x 10  DKC, yellow SB 2 x 10       Standing: at rail  Hip Extension 2x10 L/R  Hip SLR 2x10 L/R  Hip Abd 2x10 L/R  Min squats x 2 x10  Step ups  x 10 L/R, 4\" step  Lateral Step ups  x 10 L/R, 4\" step      Current HEP:  SLR  Stranding hip 3 way  Mini squats       Activity tolerance:  good    OP EDUCATION:  HEP    Assessment:  Pt's response to treatment:  good  Areas of improvements:  ROM  Limitations/deficits:  endurance    Pain end of session:   0/10    Plan:   Continue with current POC with the following changes advance as able; hurdles,etc..    Assessment of current progress against goals:  Progressing toward functional goals      Goals:  STG(3 visits)   Patient to be independent with HEP     LTG(16 visits)   WOMAC to < 20 % impaired   PROM left knee 0-120   MMT left quad/HS 4+/5   Patient to walk for 30 minutes without need to sit                   "

## 2024-07-03 ENCOUNTER — TREATMENT (OUTPATIENT)
Dept: PHYSICAL THERAPY | Facility: CLINIC | Age: 74
End: 2024-07-03
Payer: MEDICARE

## 2024-07-03 DIAGNOSIS — Z96.652 S/P TOTAL KNEE ARTHROPLASTY, LEFT: Primary | ICD-10-CM

## 2024-07-03 PROCEDURE — 97110 THERAPEUTIC EXERCISES: CPT | Mod: GP

## 2024-07-03 PROCEDURE — 97140 MANUAL THERAPY 1/> REGIONS: CPT | Mod: GP

## 2024-07-08 ENCOUNTER — LAB (OUTPATIENT)
Dept: LAB | Facility: LAB | Age: 74
End: 2024-07-08
Payer: MEDICARE

## 2024-07-08 DIAGNOSIS — E78.00 ELEVATED CHOLESTEROL: ICD-10-CM

## 2024-07-08 DIAGNOSIS — R73.01 IFG (IMPAIRED FASTING GLUCOSE): ICD-10-CM

## 2024-07-08 LAB
ALBUMIN SERPL-MCNC: 4.3 G/DL (ref 3.5–5)
ALP BLD-CCNC: 101 U/L (ref 35–125)
ALT SERPL-CCNC: 15 U/L (ref 5–40)
ANION GAP SERPL CALC-SCNC: 13 MMOL/L
AST SERPL-CCNC: 18 U/L (ref 5–40)
BILIRUB SERPL-MCNC: 0.3 MG/DL (ref 0.1–1.2)
BUN SERPL-MCNC: 20 MG/DL (ref 8–25)
CALCIUM SERPL-MCNC: 10.2 MG/DL (ref 8.5–10.4)
CHLORIDE SERPL-SCNC: 102 MMOL/L (ref 97–107)
CHOLEST SERPL-MCNC: 205 MG/DL (ref 133–200)
CHOLEST/HDLC SERPL: 3.4 {RATIO}
CO2 SERPL-SCNC: 24 MMOL/L (ref 24–31)
CREAT SERPL-MCNC: 0.9 MG/DL (ref 0.4–1.6)
EGFRCR SERPLBLD CKD-EPI 2021: 67 ML/MIN/1.73M*2
ERYTHROCYTE [DISTWIDTH] IN BLOOD BY AUTOMATED COUNT: 13.6 % (ref 11.5–14.5)
EST. AVERAGE GLUCOSE BLD GHB EST-MCNC: 108 MG/DL
GLUCOSE SERPL-MCNC: 104 MG/DL (ref 65–99)
HBA1C MFR BLD: 5.4 %
HCT VFR BLD AUTO: 39.6 % (ref 36–46)
HDLC SERPL-MCNC: 61 MG/DL
HGB BLD-MCNC: 12.9 G/DL (ref 12–16)
LDLC SERPL CALC-MCNC: 108 MG/DL (ref 65–130)
MCH RBC QN AUTO: 32.3 PG (ref 26–34)
MCHC RBC AUTO-ENTMCNC: 32.6 G/DL (ref 32–36)
MCV RBC AUTO: 99 FL (ref 80–100)
NRBC BLD-RTO: 0 /100 WBCS (ref 0–0)
PLATELET # BLD AUTO: 361 X10*3/UL (ref 150–450)
POTASSIUM SERPL-SCNC: 4.6 MMOL/L (ref 3.4–5.1)
PROT SERPL-MCNC: 7.1 G/DL (ref 5.9–7.9)
RBC # BLD AUTO: 4 X10*6/UL (ref 4–5.2)
SODIUM SERPL-SCNC: 139 MMOL/L (ref 133–145)
TRIGL SERPL-MCNC: 181 MG/DL (ref 40–150)
WBC # BLD AUTO: 6.4 X10*3/UL (ref 4.4–11.3)

## 2024-07-08 PROCEDURE — 80061 LIPID PANEL: CPT

## 2024-07-08 PROCEDURE — 80053 COMPREHEN METABOLIC PANEL: CPT

## 2024-07-08 PROCEDURE — 83036 HEMOGLOBIN GLYCOSYLATED A1C: CPT

## 2024-07-08 PROCEDURE — 36415 COLL VENOUS BLD VENIPUNCTURE: CPT

## 2024-07-08 PROCEDURE — 85027 COMPLETE CBC AUTOMATED: CPT

## 2024-07-08 NOTE — PROGRESS NOTES
"    Physical Therapy Treatment    Patient Name: Leanne Mims  MRN: [id number]  Encounter date:  7/9/2024  Time Calculation  Start Time: 1020  Stop Time: 1100  Time Calculation (min): 40 min     PT Therapeutic Procedures Time Entry  Manual Therapy Time Entry: 10  Therapeutic Exercise Time Entry: 30    Visit Number:  6 (including evaluation)  Planned total visits: 16  Visits Authorized/Insurance Coverage:  MEDICARE A&B, [place], [place] ( $74.32 USED PT/ST)     Current Problem  Problem List Items Addressed This Visit    None  Visit Diagnoses         Codes    S/P total knee arthroplasty, left    -  Primary Z96.652          Surgery  S/p left TKA 5/21 07/08/2024 6 weeks post surgery      Precautions  Vertigo       Pain  \"Stiffness\"  4-5/10 at worst    Subjective  General  Patient in good spirits; reports chief complaint of stiffness after prolonged standing.    Objective  Improving stability and symetry without cane    Treatment:       Manual:  Supine over bolster  STM left quad     Therapeutic Exercise  Std bike Seat 26 x 5:28 1 mile min full revolution     Supine:     SLR 2 x 10  DKC, yellow SB 2 x 10        Standing: at rail  Hip Extension 2x10 L/R  Hip SLR 2x10 L/R  Hip Abd 2x10 L/R  Min squats x 2 x10  Step ups  x 10 L/R, 4\" step  Lateral Step ups  x 10 L/R, 4\" step  Hurdles FWD/LAT x 4        Current HEP:  SLR  Stranding hip 3 way  Mini squats    Activity tolerance:  good    OP EDUCATION:  HEP    Assessment:     Pt's response to treatment:  good  Areas of improvements:  gait quality  Limitations/deficits:  tightness with prolonged sitting    Pain end of session:   0-1.5/10    Plan:  Continue with current POC with the following changes add resistance as appropriate/manual PRN    Assessment of current progress against goals:  Progressing toward functional goals post TKA.      Goals:  STG(3 visits)   Patient to be independent with HEP     LTG(16 visits)   WOMAC to < 20 % impaired   PROM left knee 0-120   MMT " left quad/HS 4+/5   Patient to walk for 30 minutes without need to sit

## 2024-07-09 ENCOUNTER — TREATMENT (OUTPATIENT)
Dept: PHYSICAL THERAPY | Facility: CLINIC | Age: 74
End: 2024-07-09
Payer: MEDICARE

## 2024-07-09 DIAGNOSIS — Z96.652 S/P TOTAL KNEE ARTHROPLASTY, LEFT: Primary | ICD-10-CM

## 2024-07-09 PROCEDURE — 97110 THERAPEUTIC EXERCISES: CPT | Mod: GP

## 2024-07-09 PROCEDURE — 97140 MANUAL THERAPY 1/> REGIONS: CPT | Mod: GP

## 2024-07-10 NOTE — PROGRESS NOTES
"    Physical Therapy Treatment    Patient Name: Leanne Mims  MRN: [id number]  Encounter date:  7/11/2024  Time Calculation  Start Time: 1100  Stop Time: 1116  Time Calculation (min): 46 min  PT Therapeutic Procedures Time Entry  Manual Therapy Time Entry: 10  Therapeutic Exercise Time Entry: 33     Visit Number:  7 (including evaluation)  Planned total visits: 16  Visits Authorized/Insurance Coverage:  MEDICARE A&B, [place], [place], NO AUTH ( $74.32 USED PT/ST)     Current Problem  Problem List Items Addressed This Visit    None  Visit Diagnoses         Codes    S/P total knee arthroplasty, left     Z96.652          Surgery  S/p left TKA 5/21 07/08/2024 6 weeks post surgery      Precautions  Vertigo    Pain  0/10 achy vs pain    Subjective  General     Patient reports LEFT knee \"aching\", noting it has decreased but can still take a minute to \"get moving\" in the morning. Patient states she has been elevating her LLE to treat swelling, which has improved the symptoms.  Main complaint of \"tightness\". Patient notes she has discontinued use of the walker, is decreasing cane usage, noting she brings a chair with her to sit on to pull weeds and work in the garden.     Objective     Surgical incision appears closed and well healed, small scab still present in lower 1/3 of scar, no bleeding/ seeping seen, no signs of infection. Patient ambulated into appointment using [facility], did not use to navigate through therapy space.     Treatment:  Manual:  Supine on wedge- STM left quad     Therapeutic Exercise  NuStep Seat 10 x 8:30 min 0.5 mile     Supine:   SLR 2 x 10  DKC,  w/ Green SB 2 x 10     Standing: at rail  Hip Extension 2x10 L/R  Hip SLR 2x10 L/R  Hip Abd 2x10 L/R  Min squats 2 x10  Step ups  x 10 L/R, 4\" step  Lateral Step ups  x 10 L/R, 4\" step    Hurdles FWD/LAT x 4 DNP 7/11     Current HEP:  SLR  Stranding hip 3 way  Mini squats    Activity tolerance:  Good    OP EDUCATION:    Assessment:     Warmup performed on NuStep " due to similarity to exercise equipment pt has at home but has not used yet due to uncertainty of L knee response. Good response, increased confidence in ability to use home exercise bike. Good response to supine and standing therex, potential to increase resistance. Denied increased L knee pain after therex.    Pt's response to treatment:  Good  Areas of improvements: Improved exercise endurance, ambulation w/ decreased use of assistive devices  Limitations/deficits: Increased L knee tension with prolonged sitting and upon waking.     Pain end of session: 0/10     Plan:    Continue with current POC/no changes    Assessment of current progress against goals:  Progressing toward functional goals    Goals:  STG(3 visits)   Patient to be independent with HEP     LTG(16 visits)   WOMAC to < 20 % impaired   PROM left knee 0-120   MMT left quad/HS 4+/5   Patient to walk for 30 minutes without need to sit

## 2024-07-11 ENCOUNTER — TREATMENT (OUTPATIENT)
Dept: PHYSICAL THERAPY | Facility: CLINIC | Age: 74
End: 2024-07-11
Payer: MEDICARE

## 2024-07-11 DIAGNOSIS — Z96.652 S/P TOTAL KNEE ARTHROPLASTY, LEFT: ICD-10-CM

## 2024-07-11 PROCEDURE — 97110 THERAPEUTIC EXERCISES: CPT | Mod: CQ,GP | Performed by: SPECIALIST/TECHNOLOGIST

## 2024-07-11 PROCEDURE — 97140 MANUAL THERAPY 1/> REGIONS: CPT | Mod: GP | Performed by: SPECIALIST/TECHNOLOGIST

## 2024-07-15 NOTE — PROGRESS NOTES
"    Physical Therapy Treatment    Patient Name: Leanne Mims  MRN: [id number]  Encounter date:  7/16/2024  Time Calculation  Start Time: 1031   Stop Time: 1114  Time Calculation (min): 43 min  PT Therapeutic Procedures Time Entry  Manual Therapy Time Entry: 10  Therapeutic Exercise Time Entry: 31     Visit Number:  8 (including evaluation)  Planned total visits: 16  Visits Authorized/Insurance Coverage:  MEDICARE A&B[place], [place] ( $74.32 USED PT/ST)     Current Problem  Problem List Items Addressed This Visit    None  Visit Diagnoses         Codes    S/P total knee arthroplasty, left     Z96.652            Surgery  S/p left TKA 5/21 07/08/2024 6 weeks post surgery      Precautions  Vertigo    Pain   3/10     Subjective  General     Patient reports she has been working in her garden, compliant with her HEP, and getting outside for more exercises regularly, but notes she is still hesitant to ambulate up and down steps with reciprocal gait, stating she had to walk with step to for     Objective       L knee able to bend sufficiently to step down w/ RLE, movement more hesitant as bending R knee to step down with LLE.      Treatment:  Recumbent bike Seat 10 x 7', 1.36 mile     Supine:   SLR 2 x 10  DKC,  w/ Green SB 2 x 10     Standing: at rail  Hip Extension 2x10 L/R  Hip SLR 2x10 L/R  Hip Abd 2x10 L/R  Min squats 2 x10    At steps:  Step ups  x 10 L/R, 7\" step  Lateral Step ups  x 10 L/R, 7\" step  Reciprocal stair ambulation, Up    Hurdles FWD/LAT x 4 DNP 7/11    Current HEP:  SLR  Stranding hip 3 way  Mini squats    Activity tolerance:  Good    OP EDUCATION:      Patient advised to include ascending and descending stairs in HEP as L knee tolerates.     Assessment:       Decreased L knee tightness following STM.  Good response to standing therex, mild increase in L knee pain with mini squats, resolved after therex. Patient performed forward step ups and ascending and descending at stairs in gym with " reciprocal pattern. Good ability to ascend with reciprocal gait, hesitation bending L knee and increased flexion of L hip to descend leading with RLE. Increased L knee fatigue after therex    Pt's response to treatment:  Good .  Areas of improvements: Improved ability to ascend stairs,   Limitations/deficits: Limited ability to bend L knee to descend stairs leading with RLE    Pain end of session: 2/10 L knee tightness    Plan:    Continue with current POC/no changes    Assessment of current progress against goals:  Progressing toward functional goals    Goals:  STG(3 visits)   Patient to be independent with HEP     LTG(16 visits)   WOMAC to < 20 % impaired   PROM left knee 0-120   MMT left quad/HS 4+/5   Patient to walk for 30 minutes without need to sit

## 2024-07-16 ENCOUNTER — TREATMENT (OUTPATIENT)
Dept: PHYSICAL THERAPY | Facility: CLINIC | Age: 74
End: 2024-07-16
Payer: MEDICARE

## 2024-07-16 DIAGNOSIS — Z96.652 S/P TOTAL KNEE ARTHROPLASTY, LEFT: ICD-10-CM

## 2024-07-16 PROCEDURE — 97110 THERAPEUTIC EXERCISES: CPT | Mod: CQ,GP | Performed by: SPECIALIST/TECHNOLOGIST

## 2024-07-16 PROCEDURE — 97140 MANUAL THERAPY 1/> REGIONS: CPT | Mod: CQ,GP | Performed by: SPECIALIST/TECHNOLOGIST

## 2024-07-17 ENCOUNTER — OFFICE VISIT (OUTPATIENT)
Dept: PRIMARY CARE | Facility: CLINIC | Age: 74
End: 2024-07-17
Payer: MEDICARE

## 2024-07-17 VITALS
WEIGHT: 186 LBS | HEIGHT: 67 IN | DIASTOLIC BLOOD PRESSURE: 82 MMHG | TEMPERATURE: 97.8 F | SYSTOLIC BLOOD PRESSURE: 138 MMHG | BODY MASS INDEX: 29.19 KG/M2 | HEART RATE: 63 BPM | OXYGEN SATURATION: 99 %

## 2024-07-17 DIAGNOSIS — R73.01 IFG (IMPAIRED FASTING GLUCOSE): ICD-10-CM

## 2024-07-17 DIAGNOSIS — I10 ESSENTIAL HYPERTENSION: Primary | ICD-10-CM

## 2024-07-17 DIAGNOSIS — F41.9 ANXIETY: ICD-10-CM

## 2024-07-17 DIAGNOSIS — E78.00 ELEVATED CHOLESTEROL: ICD-10-CM

## 2024-07-17 PROBLEM — D32.0 BENIGN NEOPLASM OF CEREBRAL MENINGES (MULTI): Status: RESOLVED | Noted: 2023-12-27 | Resolved: 2024-07-17

## 2024-07-17 PROBLEM — C50.919 BREAST CANCER (MULTI): Status: RESOLVED | Noted: 2023-09-05 | Resolved: 2024-07-17

## 2024-07-17 PROCEDURE — 1125F AMNT PAIN NOTED PAIN PRSNT: CPT | Performed by: INTERNAL MEDICINE

## 2024-07-17 PROCEDURE — 3079F DIAST BP 80-89 MM HG: CPT | Performed by: INTERNAL MEDICINE

## 2024-07-17 PROCEDURE — 99214 OFFICE O/P EST MOD 30 MIN: CPT | Performed by: INTERNAL MEDICINE

## 2024-07-17 PROCEDURE — 1159F MED LIST DOCD IN RCRD: CPT | Performed by: INTERNAL MEDICINE

## 2024-07-17 PROCEDURE — 3008F BODY MASS INDEX DOCD: CPT | Performed by: INTERNAL MEDICINE

## 2024-07-17 PROCEDURE — 1036F TOBACCO NON-USER: CPT | Performed by: INTERNAL MEDICINE

## 2024-07-17 PROCEDURE — G2211 COMPLEX E/M VISIT ADD ON: HCPCS | Performed by: INTERNAL MEDICINE

## 2024-07-17 PROCEDURE — 3075F SYST BP GE 130 - 139MM HG: CPT | Performed by: INTERNAL MEDICINE

## 2024-07-17 RX ORDER — LOSARTAN POTASSIUM AND HYDROCHLOROTHIAZIDE 12.5; 1 MG/1; MG/1
1 TABLET ORAL DAILY
Qty: 90 TABLET | Refills: 2 | Status: SHIPPED | OUTPATIENT
Start: 2024-07-17

## 2024-07-17 RX ORDER — PRAVASTATIN SODIUM 40 MG/1
40 TABLET ORAL NIGHTLY
Qty: 90 TABLET | Refills: 2 | Status: SHIPPED | OUTPATIENT
Start: 2024-07-17

## 2024-07-17 RX ORDER — SERTRALINE HYDROCHLORIDE 25 MG/1
25 TABLET, FILM COATED ORAL DAILY
Qty: 90 TABLET | Refills: 2 | Status: SHIPPED | OUTPATIENT
Start: 2024-07-17 | End: 2025-07-17

## 2024-07-17 RX ORDER — SERTRALINE HYDROCHLORIDE 50 MG/1
50 TABLET, FILM COATED ORAL DAILY
Start: 2024-07-17 | End: 2024-07-17 | Stop reason: DRUGHIGH

## 2024-07-17 RX ORDER — PRAVASTATIN SODIUM 40 MG/1
40 TABLET ORAL NIGHTLY
Start: 2024-07-17 | End: 2024-07-17 | Stop reason: SDUPTHER

## 2024-07-17 RX ORDER — ATENOLOL 50 MG/1
50 TABLET ORAL DAILY
Qty: 90 TABLET | Refills: 2 | Status: SHIPPED | OUTPATIENT
Start: 2024-07-17

## 2024-07-17 ASSESSMENT — PATIENT HEALTH QUESTIONNAIRE - PHQ9
2. FEELING DOWN, DEPRESSED OR HOPELESS: NOT AT ALL
1. LITTLE INTEREST OR PLEASURE IN DOING THINGS: NOT AT ALL
SUM OF ALL RESPONSES TO PHQ9 QUESTIONS 1 AND 2: 0

## 2024-07-17 ASSESSMENT — ENCOUNTER SYMPTOMS
PALPITATIONS: 0
SHORTNESS OF BREATH: 0

## 2024-07-17 ASSESSMENT — PAIN SCALES - GENERAL: PAINLEVEL: 2

## 2024-07-17 NOTE — PROGRESS NOTES
"    Physical Therapy Treatment    Patient Name: Leanne Mims  MRN: [id number]  Encounter date:  7/18/2024  Time Calculation  Start Time:1030  Stop Time: 1114  Time Calculation (min): 44 min  PT Therapeutic Procedures Time Entry  Manual Therapy Time Entry: 10  Therapeutic Exercise Time Entry: 31     Visit Number:  9 (including evaluation)  Planned total visits: 16  Visits Authorized/Insurance Coverage:  MEDICARE A&B[place], [place] ( $74.32 USED PT/ST)        Current Problem  Problem List Items Addressed This Visit    None  Visit Diagnoses         Codes    S/P total knee arthroplasty, left     Z96.652            Surgery  S/p left TKA 5/21 07/08/2024 6 weeks post surgery      Precautions  Vertigo     Pain      1+/10     Subjective  General      Patient reports she attempted to climb steps at home, increased pain, 4/10, ascending. Patient relays her large dog ran into her L knee while she was standing, noting \"It didn't hurt.\"    Objective      Tissue texture changes palpated w/ IASTM, medial and lateral distal quad.         L knee PROM: 0-130 deg.     Treatment:  Therapeutic   Recumbent bike Seat 10 x 5', 0.8 mile     Supine:   SLR 2 x 10, #1, LLE  DKC,  w/ Green SB x 20     Standing: at rail, #1   Hip Extension 2x10 L/R,   Hip SLR 2x10 L/R  Hip Abd 2x10 L/R  Min squats 2 x10     At gym stairs:  Step ups  x 10 L/R,   Lateral Step ups  x 10 L/R,   Reciprocal stair ambulation: ascending leading with LLE, 3 x 1 flight, descending leading w/ RLE x 2, w/ LLE x1      - Rail on R side when ascending as at home.     Hurdles FWD/LAT x 4 DNP 7/11     Current HEP:  SLR  Stranding hip 3 way  Mini squats     Activity tolerance:  Good     OP EDUCATION:    Assessment:     IASTM performed to address tightness in distal quads, good response. Improved L knee ROM as recorded. Increased resistance used on SLR and standing 3 way hip, good response. Improved ability to ascend/descend stairs, less compensation at L hip with " descending leading w/ RLE. Denied L knee pain after treatment.     Pt's response to treatment:  Good  Areas of improvements: Improved L knee AROM, decreased L knee pain, Improved BLE strength.   Limitations/deficits: Mild L hip compensation utilized for descending stairs.      Pain end of session: 0/10    Plan:    Continue with current POC/no changes    Assessment of current progress against goals:  Progressing toward functional goals    Goals:  STG(3 visits)   Patient to be independent with HEP     LTG(16 visits)   WOMAC to < 20 % impaired   PROM left knee 0-120   MMT left quad/HS 4+/5   Patient to walk for 30 minutes without need to sit

## 2024-07-17 NOTE — PATIENT INSTRUCTIONS
Kim Martinez End of April, get labs doen 1-2 weeks before apt.      Diagnoses and all orders for this visit:  Essential hypertension  Comments:  BP at home doing well <140/90 at 138. RSV vaccine at local pharmacy good idea 9-10/24. Labs get 1-2 weeks before [facility] apt.  Orders:  -     losartan-hydrochlorothiazide (Hyzaar) 100-12.5 mg tablet; Take 1 tablet by mouth once daily.  -     atenolol (Tenormin) 50 mg tablet; Take 1 tablet (50 mg) by mouth once daily.  Anxiety  Comments:  Sertraline 25 mg , doing OK. 50 mg to tired.  Orders:  -     sertraline (Zoloft) 25 mg tablet; Take 1 tablet (25 mg) by mouth once daily.  Elevated cholesterol  Comments:  [id number] on pravastatin.  Orders:  -     pravastatin (Pravachol) 40 mg tablet; Take 1 tablet (40 mg) by mouth once daily at bedtime.  -     Comprehensive Metabolic Panel; Future  -     Lipid Panel; Future  IFG (impaired fasting glucose)  Comments:  HGA1C 5.4%, low sugar diet.  Orders:  -     Hemoglobin A1C; Future  Other orders  -     Follow Up In Primary Care - Established

## 2024-07-17 NOTE — PROGRESS NOTES
[facility]: [facility]  PROGRESS NOTE      Leanne Mims is a 73 y.o. female that is presenting today for Hyperlipidemia (6 mo fu).    Assessment/Plan   Diagnoses and all orders for this visit:  Essential hypertension  Comments:  BP at home doing well <140/90 at 138. RSV vaccine at local pharmacy good idea 9-10/24. Labs get 1-2 weeks before Gisela blake.  Orders:  -     losartan-hydrochlorothiazide (Hyzaar) 100-12.5 mg tablet; Take 1 tablet by mouth once daily.  -     atenolol (Tenormin) 50 mg tablet; Take 1 tablet (50 mg) by mouth once daily.  Anxiety  Comments:  Sertraline 25 mg , doing OK. 50 mg to tired.  Orders:  -     sertraline (Zoloft) 25 mg tablet; Take 1 tablet (25 mg) by mouth once daily.  Elevated cholesterol  Comments:  [id number] on pravastatin.  Orders:  -     pravastatin (Pravachol) 40 mg tablet; Take 1 tablet (40 mg) by mouth once daily at bedtime.  -     Comprehensive Metabolic Panel; Future  -     Lipid Panel; Future  IFG (impaired fasting glucose)  Comments:  HGA1C 5.4%, low sugar diet.  Orders:  -     Hemoglobin A1C; Future  Other orders  -     Follow Up In Primary Care - Established  Subjective   Follow up HTN, chol, anxiety. BP away from office doing well. Left TKA doing well. .Anxiety doing OK 25 mg. IFBS doing OK.    Hyperlipidemia  Pertinent negatives include no chest pain or shortness of breath.     Review of Systems   Respiratory:  Negative for shortness of breath.    Cardiovascular:  Negative for chest pain and palpitations.   All other systems reviewed and are negative.     Objective   Vitals:    07/17/24 1005   BP: 159/89   Pulse: 63   Temp: 36.6 °C (97.8 °F)   SpO2: 99%      Body mass index is 29.13 kg/m².  Physical Exam  Constitutional:       General: She is not in acute distress.     Appearance: She is not toxic-appearing.      Comments: Cane with LTKA.   HENT:      Head: Normocephalic and atraumatic.      Right Ear: Tympanic membrane and ear canal  normal.      Left Ear: Tympanic membrane and ear canal normal.      Nose: Nose normal.      Mouth/Throat:      Pharynx: Oropharynx is clear.   Eyes:      Extraocular Movements: Extraocular movements intact.      Pupils: Pupils are equal, round, and reactive to light.   Cardiovascular:      Rate and Rhythm: Normal rate and regular rhythm.      Heart sounds: Normal heart sounds. No murmur heard.  Pulmonary:      Breath sounds: Normal breath sounds.   Abdominal:      General: Bowel sounds are normal. There is no distension.      Palpations: Abdomen is soft. There is no mass.      Tenderness: There is no abdominal tenderness.   Musculoskeletal:         General: Tenderness (left TKA.) present. No swelling.      Right lower leg: No edema.      Left lower leg: Edema (trace) present.   Skin:     General: Skin is warm and dry.   Neurological:      General: No focal deficit present.      Mental Status: She is oriented to person, place, and time.      Sensory: No sensory deficit.      Motor: No weakness.      Deep Tendon Reflexes: Reflexes normal.     Diagnostic Results   Lab Results   Component Value Date    GLUCOSE 104 (H) 07/08/2024    CALCIUM 10.2 07/08/2024     07/08/2024    K 4.6 07/08/2024    CO2 24 07/08/2024     07/08/2024    BUN 20 07/08/2024    CREATININE 0.90 07/08/2024     Lab Results   Component Value Date    ALT 15 07/08/2024    AST 18 07/08/2024    ALKPHOS 101 07/08/2024    BILITOT 0.3 07/08/2024     Lab Results   Component Value Date    WBC 6.4 07/08/2024    HGB 12.9 07/08/2024    HCT 39.6 07/08/2024    MCV 99 07/08/2024     07/08/2024     Lab Results   Component Value Date    CHOL 205 (H) 07/08/2024    CHOL 194 12/11/2023    CHOL 186 05/24/2023     Lab Results   Component Value Date    HDL 61.0 07/08/2024    HDL 57.0 12/11/2023    HDL 64 05/24/2023     Lab Results   Component Value Date    LDLCALC 108 07/08/2024    LDLCALC 97 12/11/2023    LDLCALC 92 05/24/2023     Lab Results   Component  "Value Date    TRIG 181 (H) 07/08/2024    TRIG 202 (H) 12/11/2023    TRIG 148 05/24/2023     No components found for: \"CHOLHDL\"  Lab Results   Component Value Date    HGBA1C 5.4 07/08/2024     Other labs not included in the list above were reviewed either before or during this encounter.    History    Past Medical History:   Diagnosis Date   • Anxiety 09/05/2023   • Cataract 2023   • Dizziness 12/29/2023    Only upon getting up from laying down sometimes.   • Elevated cholesterol 09/05/2023   • Essential hypertension 09/05/2023   • IFG (impaired fasting glucose) 09/05/2023   • Meningioma (Multi) 09/05/2023 5/23 w/wo menigioma No issues  neurosurgery. craniovertebarl junction   • Neuropathy 09/05/2023    R>L TSH,B12, SPEP M protein? Monoclonal anaylsis neg. 5/22.   • Personal history of malignant neoplasm of breast 09/05/2023    Left XRT, Letrizole 11/17 ed   • Primary osteoarthritis involving multiple joints 12/29/2023     for left knee DJD.   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     Past Surgical History:   Procedure Laterality Date   • BREAST LUMPECTOMY  2017    Had radiation treatment.   • JOINT REPLACEMENT Left 05/21/2024    TLKR     Family History   Problem Relation Name Age of Onset   • Heart disease Mother Eunice Denis    • Hypertension Mother Eunice Denis    • Cancer Father Layo Denis      Social History     Socioeconomic History   • Marital status:      Spouse name: Not on file   • Number of children: Not on file   • Years of education: Not on file   • Highest education level: Not on file   Occupational History   • Not on file   Tobacco Use   • Smoking status: Never     Passive exposure: Never   • Smokeless tobacco: Never   Vaping Use   • Vaping status: Never Used   Substance and Sexual Activity   • Alcohol use: Yes     Alcohol/week: 3.0 standard drinks of alcohol     Types: 3 Glasses of wine per week     " Comment: small glass with ice   • Drug use: Never   • Sexual activity: Not Currently   Other Topics Concern   • Not on file   Social History Narrative   • Not on file     Social Determinants of Health     Financial Resource Strain: Low Risk  (5/21/2024)    Overall Financial Resource Strain (CARDIA)    • Difficulty of Paying Living Expenses: Not hard at all   Food Insecurity: Not on file   Transportation Needs: No Transportation Needs (6/6/2024)    OASIS : Transportation    • Lack of Transportation (Medical): No    • Lack of Transportation (Non-Medical): No    • Patient Unable or Declines to Respond: No   Physical Activity: Not on file   Stress: Not on file   Social Connections: Feeling Socially Integrated (6/6/2024)    OASIS : Social Isolation    • Frequency of experiencing loneliness or isolation: Never   Intimate Partner Violence: Not on file   Housing Stability: Low Risk  (5/21/2024)    Housing Stability Vital Sign    • Unable to Pay for Housing in the Last Year: No    • Number of Places Lived in the Last Year: 1    • Unstable Housing in the Last Year: No     Allergies   Allergen Reactions   • Penicillins Other     Difficulty breathing      Current Outpatient Medications on File Prior to Visit   Medication Sig Dispense Refill   • atenolol (Tenormin) 50 mg tablet Take 1 tablet (50 mg) by mouth once daily.     • calcium-vitamin D3-vitamin K (Viactiv) 500-500-40 mg-unit-mcg chewable tablet Chew 1 tablet once daily. With a meal     • letrozole (Femara) 2.5 mg tablet Take 1 tablet (2.5 mg total) by mouth once daily.     • losartan-hydrochlorothiazide (Hyzaar) 100-12.5 mg tablet TAKE 1 TABLET BY MOUTH EVERY DAY 90 tablet 2   • multivitamin tablet Take 1 tablet by mouth once daily.     • NON FORMULARY Take by mouth once daily. Juice Plus Fibre ..     • omega 3-dha-epa-fish oil (Fish OiL) 1,000 mg (120 mg-180 mg) capsule Take 1 capsule (1,000 mg) by mouth once daily.     • turmeric root extract 500 mg tablet  Take 1 tablet by mouth 2 times a day.     • [DISCONTINUED] pravastatin (Pravachol) 40 mg tablet TAKE 1 TABLET BY MOUTH EVERY DAY (Patient taking differently: Take 1 tablet (40 mg) by mouth once daily at bedtime.) 90 tablet 1   • [DISCONTINUED] sertraline (Zoloft) 50 mg tablet Take 1 tablet (50 mg) by mouth once daily. (Patient taking differently: Take 1 tablet (50 mg) by mouth once daily. 1/2 tab daily) 90 tablet 2   • [DISCONTINUED] ibuprofen 200 mg tablet Take 1 tablet (200 mg) by mouth every 6 hours if needed for mild pain (1 - 3).       No current facility-administered medications on file prior to visit.     Immunization History   Administered Date(s) Administered   • Pneumococcal conjugate vaccine, 13-valent (PREVNAR 13) 10/18/2019   • Pneumococcal polysaccharide vaccine, 23-valent, age 2 years and older (PNEUMOVAX 23) 10/27/2014   • Tdap vaccine, age 7 year and older (BOOSTRIX, ADACEL) 08/01/2009, 10/18/2019   • Zoster, live 04/22/2011     Patient's medical history was reviewed and updated either before or during this encounter.       Barak Hazel MD

## 2024-07-18 ENCOUNTER — TREATMENT (OUTPATIENT)
Dept: PHYSICAL THERAPY | Facility: CLINIC | Age: 74
End: 2024-07-18
Payer: MEDICARE

## 2024-07-18 DIAGNOSIS — Z96.652 S/P TOTAL KNEE ARTHROPLASTY, LEFT: ICD-10-CM

## 2024-07-18 PROCEDURE — 97110 THERAPEUTIC EXERCISES: CPT | Mod: CQ,GP | Performed by: SPECIALIST/TECHNOLOGIST

## 2024-07-18 PROCEDURE — 97140 MANUAL THERAPY 1/> REGIONS: CPT | Mod: CQ,GP | Performed by: SPECIALIST/TECHNOLOGIST

## 2024-07-23 ENCOUNTER — APPOINTMENT (OUTPATIENT)
Dept: PHYSICAL THERAPY | Facility: CLINIC | Age: 74
End: 2024-07-23
Payer: MEDICARE

## 2024-07-26 ENCOUNTER — TREATMENT (OUTPATIENT)
Dept: PHYSICAL THERAPY | Facility: CLINIC | Age: 74
End: 2024-07-26
Payer: MEDICARE

## 2024-07-26 DIAGNOSIS — Z96.652 S/P TOTAL KNEE ARTHROPLASTY, LEFT: ICD-10-CM

## 2024-07-26 PROCEDURE — 97110 THERAPEUTIC EXERCISES: CPT | Mod: GP

## 2024-07-26 NOTE — PROGRESS NOTES
"    Physical Therapy Treatment    Patient Name: Leanne Mims  MRN: [id number]  Encounter date:  7/26/2024  Time Calculation  Start Time: 1015  Stop Time: 1100  Time Calculation (min): 45 min     PT Therapeutic Procedures Time Entry  Therapeutic Exercise Time Entry: 40    Visit Number:  Visit count could not be calculated. Make sure you are using a visit which is associated with an episode. (including evaluation)  Planned total visits: 16  Visits Authorized/Insurance Coverage:  MEDICARE A&B, [place], [place], NO AUTH ( $74.32 USED PT/ST)     Current Problem  Problem List Items Addressed This Visit    None  Visit Diagnoses         Codes    S/P total knee arthroplasty, left     Z96.652              Surgery  S/p left TKA 5/21 07/08/2024 6 weeks post surgery      Precautions  Vertigo      Pain  \"Heavy\"- reduced pain    Subjective  General  Patient to ortho- pleased with progress; follow up in 6 months.  Patient reports she \"forgot cane\"- much less pain left knee.  Patient with some left knee ankle    Objective  PROM 0-130  MMT   Quad 4+  HS 4+    Treatment:    Therapeutic Exercise  Recumbent bike Seat 10 x 5', 0.8 mile     Supine:   SLR 2 x 10, #1, LLE  DKC,  w/ Green SB x 20     Standing: at rail, #1   Hip Extension 2x10 L/R,   Hip SLR 2x10 L/R  Hip Abd 2x10 L/R  Min squats 2 x10     Step ups 4\" x 10 L/R,   Lateral Step ups 4\"  x 10 L/R,   Hurdles FWD/LAT x 4    Up and down 10 steps- reciprocal independent    Current HEP:  SLR  Stranding hip 3 way  Mini squats    Activity tolerance:  Good    OP EDUCATION:  HEP    Assessment:  Pt's response to treatment:  good  Areas of improvements:  function/pain  Limitations/deficits:  ankle pain left    Pain end of session:   0-1/10    Plan:  Continue with current POC with the following changes advance as able    Assessment of current progress against goals:  Progressing toward functional goals      Goals:  STG(3 visits)   Patient to be independent with HEP     LTG(16 visits)   WOMAC " to < 20 % impaired- NE   PROM left knee 0-120- MET   MMT left quad/HS 4+/5- MET   Patient to walk for 30 minutes without need to sit- NE

## 2024-07-30 ENCOUNTER — TREATMENT (OUTPATIENT)
Dept: PHYSICAL THERAPY | Facility: CLINIC | Age: 74
End: 2024-07-30
Payer: MEDICARE

## 2024-07-30 ENCOUNTER — TELEPHONE (OUTPATIENT)
Dept: PRIMARY CARE | Facility: CLINIC | Age: 74
End: 2024-07-30

## 2024-07-30 DIAGNOSIS — I10 ESSENTIAL HYPERTENSION: ICD-10-CM

## 2024-07-30 DIAGNOSIS — Z96.652 S/P TOTAL KNEE ARTHROPLASTY, LEFT: Primary | ICD-10-CM

## 2024-07-30 PROCEDURE — 97110 THERAPEUTIC EXERCISES: CPT | Mod: GP

## 2024-07-30 RX ORDER — LOSARTAN POTASSIUM AND HYDROCHLOROTHIAZIDE 12.5; 1 MG/1; MG/1
1 TABLET ORAL DAILY
Qty: 90 TABLET | Refills: 2 | Status: SHIPPED | OUTPATIENT
Start: 2024-07-30

## 2024-07-30 NOTE — PROGRESS NOTES
"    Physical Therapy Treatment    Patient Name: Leanne Mims  MRN: [id number]  Encounter date:  7/30/2024  Time Calculation  Start Time: 1015  Stop Time: 1055  Time Calculation (min): 40 min     PT Therapeutic Procedures Time Entry  Therapeutic Exercise Time Entry: 38    Visit Number:  11 (including evaluation)  Planned total visits: 16  Visits Authorized/Insurance Coverage:  MEDICARE A&B, [place], [place], [place] ( $74.32 USED PT/ST)     Current Problem  Problem List Items Addressed This Visit    None  Visit Diagnoses         Codes    S/P total knee arthroplasty, left    -  Primary Z96.652          Surgery  S/p left TKA 5/21         Precautions  Vertigo       Pain  \"Ache\"  Left ankle > knee pain    Subjective  General  Patient in good spirits; describes modest \"ache\" left knee    Objective  Gait symetry improving    Treatment:       Therapeutic Exercise  Upright bike L2  1 mile x 5:28 seat 26     Supine:   SLR 2 x 10, #1.5, LLE  DKC,  w/ Green SB x 20     Standing: at rail, #1.5   Hip Extension 2x10 L/R,   Hip SLR 2x10 L/R  Hip Abd 2x10 L/R  HS curls 2 x 10 L/R  Mini squats 2 x10     Step ups 4\" x 15 L/R,   Lateral Step ups 4\"  x 15 L/R,   Hurdles FWD/LAT x 4  Foam step tap 8\" x 20  Balance beam tandem/sidestep x 6 ea       Current HEP:  SLR  Stranding hip 3 way  Mini squats    Activity tolerance:  good    OP EDUCATION:  HEP    Assessment:  Pt's response to treatment:  HEP  Areas of improvements:  function/endurance  Limitations/deficits:  modest limp    Pain end of session:   0-1/10    Plan:  Continue with current POC with the following changes advance as able     Assessment of current progress against goals:  Progressing toward functional goals        Goals:  STG(3 visits)   Patient to be independent with HEP     LTG(16 visits)   WOMAC to < 20 % impaired- NE   PROM left knee 0-120- MET   MMT left quad/HS 4+/5- MET   Patient to walk for 30 minutes without need to sit- NE     "

## 2024-08-01 ENCOUNTER — TREATMENT (OUTPATIENT)
Dept: PHYSICAL THERAPY | Facility: CLINIC | Age: 74
End: 2024-08-01
Payer: MEDICARE

## 2024-08-01 DIAGNOSIS — Z96.652 S/P TOTAL KNEE ARTHROPLASTY, LEFT: Primary | ICD-10-CM

## 2024-08-01 PROCEDURE — 97110 THERAPEUTIC EXERCISES: CPT | Mod: GP

## 2024-08-01 NOTE — PROGRESS NOTES
"    Physical Therapy Treatment    Patient Name: Leanne Mmis  MRN: [id number]  Encounter date:  8/1/2024  Time Calculation  Start Time: 1103  Stop Time: 1145  Time Calculation (min): 42 min     PT Therapeutic Procedures Time Entry  Therapeutic Exercise Time Entry: 39    Visit Number:  12 (including evaluation)  Planned total visits: 16  Visits Authorized/Insurance Coverage:  MEDICARE A&B, [place], [place], NO [place] ( $74.32 USED PT/ST)     Current Problem  Problem List Items Addressed This Visit    None  Visit Diagnoses         Codes    S/P total knee arthroplasty, left    -  Primary Z96.652            Surgery  S/p left TKA 5/21         Precautions  Vertigo     Pain  Up to 2.5/10    Subjective  General  Patient in good spirits and motivated to participate.  Patient performing HEP without issues.  Patient working in garden without issues.    Objective  Gait symetry improving    Treatment:    Therapeutic Exercise  Upright bike L2  1 mile x 5:21 seat 26     Supine:   SLR 2 x 10, #1.5, LLE  DKC,  w/ Green SB x 20     Standing: at rail, #1.5   Hip Extension 2x10 L/R,   Hip SLR 2x10 L/R  Hip Abd 2x10 L/R  HS curls 2 x 10 L/R  Mini squats 2 x10     Step ups 6\" x 15 L/R,   Lateral Step ups 6\"  x 15 L/R,   Hurdles FWD/LAT x 4  Foam step tap 8\" x 20  Balance beam tandem/sidestep x 6 ea     SLS left with tap right 12/3/6 x 10     Current HEP:  SLR  Stranding hip 3 way  Mini squats    Activity tolerance:  good    OP EDUCATION:  HEP  Pace activity    Assessment:  Pt's response to treatment:  good  Areas of improvements:  function   Limitations/deficits:  modest soreness    Pain end of session:   0/10    Plan:     Continue with current POC with the following changes advance as able.    Assessment of current progress against goals:  Progressing toward functional goals         Goals:  STG(3 visits)   Patient to be independent with HEP     LTG(16 visits)   WOMAC to < 20 % impaired- NE   PROM left knee 0-120- MET   MMT left quad/HS 4+/5- " MET   Patient to walk for 30 minutes without need to sit- NE

## 2024-08-05 NOTE — PROGRESS NOTES
"    Physical Therapy Treatment    Patient Name: Leanne Mims  MRN: [id number]  Encounter date:  8/6/2024  Time Calculation  Start Time: 1015  Stop Time: 1100  Time Calculation (min): 45 min     PT Therapeutic Procedures Time Entry  Therapeutic Exercise Time Entry: 40    Visit Number:  13 (including evaluation)  Planned total visits: 16  Visits Authorized/Insurance Coverage:  MEDICARE A&B[place], [place] ( $74.32 USED PT/ST)     Current Problem  Problem List Items Addressed This Visit    None  Visit Diagnoses         Codes    S/P total knee arthroplasty, left    -  Primary Z96.652                Surgery  S/p left TKA 5/21         Precautions  Vertigo       Pain  0/10    Subjective  General  Patient reports no knee pain.  Patient pleased with progress in PT.  Patient diligent with HEP    Objective  Modest guarding of ST left (secondary to ankle)    Treatment:    Therapeutic Exercise  Upright bike L2  1 mile x 5:21 seat 26     Supine:   SLR 2 x 10, #1.5, LLE  DKC,  w/ Green SB x 20     Standing: at rail, #1.5   Hip Extension 2x10 L/R,   Hip SLR 2x10 L/R  Hip Abd 2x10 L/R  HS curls 2 x 10 L/R  Mini squats 2 x10     Step ups 6\" x 15 L/R,   Lateral Step ups 6\"  x 15 L/R,   Hurdles FWD/LAT x 4  Foam step tap 8\" x 20  Balance beam tandem/sidestep x 6 ea     SLS left with tap right 12/3/6 x 10     Current HEP:  SLR  Stranding hip 3 way  Mini squats    Activity tolerance:  good    OP EDUCATION:  HEP    Assessment:  Pt's response to treatment:  good  Areas of improvements:  gait symetry  Limitations/deficits:  ankle pain    Pain end of session:   0/10 left knee    Plan:  Continue with current POC with the following changes advance as annabelle    Assessment of current progress against goals:  Progressing toward functional goals      Goals:  STG(3 visits)   Patient to be independent with HEP     LTG(16 visits)   WOMAC to < 20 % impaired- NE   PROM left knee 0-120- MET   MMT left quad/HS 4+/5- MET   Patient to walk for 30 minutes " without need to sit- NE

## 2024-08-06 ENCOUNTER — TREATMENT (OUTPATIENT)
Dept: PHYSICAL THERAPY | Facility: CLINIC | Age: 74
End: 2024-08-06
Payer: MEDICARE

## 2024-08-06 DIAGNOSIS — Z96.652 S/P TOTAL KNEE ARTHROPLASTY, LEFT: Primary | ICD-10-CM

## 2024-08-06 PROCEDURE — 97110 THERAPEUTIC EXERCISES: CPT | Mod: GP

## 2024-08-07 NOTE — PROGRESS NOTES
"    Physical Therapy Treatment    Patient Name: Leanne Mims  MRN: [id number]  Encounter date:  8/8/2024  Time Calculation  Start Time: 1015  Stop Time: 1100  Time Calculation (min): 45 min     PT Therapeutic Procedures Time Entry  Therapeutic Exercise Time Entry: 40    Visit Number:  14 (including evaluation)  Planned total visits: 16  Visits Authorized/Insurance Coverage:  MEDICARE A&B[place], [place] ( $74.32 USED PT/ST)     Current Problem  Problem List Items Addressed This Visit    None  Visit Diagnoses         Codes    S/P total knee arthroplasty, left    -  Primary Z96.652            Surgery  S/p left TKA 5/21         Precautions  Vertigo       Pain  Modest soreness    Subjective  General  Patient in good spirits and motivated to participate.    Objective  Modest guarding of left side WB    Treatment:    Therapeutic Exercise  Upright bike L2  1 mile x 5:23 seat 26     Supine:   SLR 2 x 10, #1.5, LLE  DKC,  w/ Green SB x 20     Standing: at rail, #1.5   Hip Extension 2x10 L/R,   Hip SLR 2x10 L/R  Hip Abd 2x10 L/R  HS curls 2 x 10 L/R  Mini squats 2 x10     Step ups 6\" x 15 L/R,   Lateral Step ups 6\"  x 15 L/R,   Hurdles FWD/LAT x 4  Foam step tap 8\" x 20  Balance beam tandem/sidestep x 6 ea     SLS left with tap right 12/3/6 x 10       Current HEP:  SLR  Stranding hip 3 way  Mini squats     Activity tolerance:  Pace activity    OP EDUCATION:  HEP  Pace activity    Assessment:  Pt's response to treatment:  ood  Areas of improvements:  endurance  Limitations/deficits:  ankle pain    Pain end of session:   No changes    Plan:  Continue with current POC with the following changes advance as tolerated    Assessment of current progress against goals:  Progressing toward functional goals         Goals:  STG(3 visits)   Patient to be independent with HEP- MET     LTG(16 visits)   WOMAC to < 20 % impaired- NE   PROM left knee 0-120- MET   MMT left quad/HS 4+/5- MET   Patient to walk for 30 minutes without need to " sit- NE

## 2024-08-08 ENCOUNTER — TREATMENT (OUTPATIENT)
Dept: PHYSICAL THERAPY | Facility: CLINIC | Age: 74
End: 2024-08-08
Payer: MEDICARE

## 2024-08-08 DIAGNOSIS — Z96.652 S/P TOTAL KNEE ARTHROPLASTY, LEFT: Primary | ICD-10-CM

## 2024-08-08 PROCEDURE — 97110 THERAPEUTIC EXERCISES: CPT | Mod: GP

## 2024-08-13 ENCOUNTER — TREATMENT (OUTPATIENT)
Dept: PHYSICAL THERAPY | Facility: CLINIC | Age: 74
End: 2024-08-13
Payer: MEDICARE

## 2024-08-13 DIAGNOSIS — Z96.652 S/P TOTAL KNEE ARTHROPLASTY, LEFT: Primary | ICD-10-CM

## 2024-08-13 PROCEDURE — 97110 THERAPEUTIC EXERCISES: CPT | Mod: GP

## 2024-08-13 NOTE — PROGRESS NOTES
"    Physical Therapy Treatment    Patient Name: Leanne Mims  MRN: [id number]  Encounter date:  8/13/2024  Time Calculation  Start Time: 1020  Stop Time: 1100  Time Calculation (min): 40 min     PT Therapeutic Procedures Time Entry  Therapeutic Exercise Time Entry: 38    Visit Number:  15 (including evaluation)  Planned total visits: 16  Visits Authorized/Insurance Coverage:  MEDICARE A&B, [place], [place] ( $74.32 USED PT/ST)     Current Problem  Problem List Items Addressed This Visit    None  Visit Diagnoses         Codes    S/P total knee arthroplasty, left    -  Primary Z96.652            Surgery  S/p left TKA 5/21         Precautions  Vertigo    Pain  0/10    Subjective  General  Patient reports preparedness for discharge with continuation of HEP,    Objective  MMT   Left quad/HS 4+/5    PROM  0-133    WOMAC      Treatment:    Therapeutic Exercise  Upright bike L2  1 mile x 5:26 seat 26     Supine:   SLR 2 x 10, #1.5, LLE  DKC,  w/ Green SB x 20     Standing: at rail, #1.5   Hip Extension 2x10 L/R,   Hip SLR 2x10 L/R  Hip Abd 2x10 L/R  HS curls 2 x 10 L/R  Mini squats 2 x10     Step ups 6\" x 15 L/R,   Lateral Step ups 6\"  x 15 L/R,   Hurdles FWD/LAT x 4  Foam step tap 8\" x 20  Balance beam tandem/sidestep x 6 ea     SLS left with tap right 12/3/6 x 10        Current HEP:  SLR  Stranding hip 3 way  Mini squats    Activity tolerance:  Good    OP EDUCATION:  HEP    Assessment:  Pt's response to treatment:  good  Areas of improvements:  ROM/strength/function  Limitations/deficits:  left ankle pain    Pain end of session:   0/10    Plan:  Discharge; continue with HEP    Assessment of current progress against goals:  Functionally independent, goals met:  Date met 8/13    Goals:  STG(3 visits)   Patient to be independent with HEP- MET     LTG(16 visits)   WOMAC to < 20 % impaired- MET   PROM left knee 0-120- MET   MMT left quad/HS 4+/5- MET   Patient to walk for 30 minutes without need to sit-MET        "

## 2024-09-22 DIAGNOSIS — E78.00 ELEVATED CHOLESTEROL: ICD-10-CM

## 2024-09-23 RX ORDER — PRAVASTATIN SODIUM 40 MG/1
40 TABLET ORAL DAILY
Qty: 90 TABLET | Refills: 2 | Status: SHIPPED | OUTPATIENT
Start: 2024-09-23

## 2024-10-08 NOTE — PROGRESS NOTES
Verbal consent of the patient and/or verbal parental consent for patients under the age of 18 have been obtained to conduct a physical examination at this office visit.    New patient  History Of Present Illness  10/09/24 Leanne Mims is a 74 y.o. female who presents for an evaluation of their Left ankle. Arrives with her  [place]. States that 5 years ago she sprained her ankle on vacation and was able to tolerate walking on it with an ankle brace. Has been a minor discomfort since then. In May she had a TKR on the left leg and has been and since then the left ankle pain has resurfaced and worsened. She states a 6/10 grinding type pain. She wears a supportive ankle brace regularly. She was seen by [facility] and had XR completed on 9/27 where they found arthritic changes. States that she will take advil prn for pain management. States that she goes up and down stairs one leg at a time. States that her pain is relatively the same regardless of activity.     All previous Progress Notes and imaging results related to this patients chief complaint have been reviewed in preparation for this examination.    Past Medical History  She has a past medical history of Anxiety (09/05/2023), Cataract (2023), Dizziness (12/29/2023), Elevated cholesterol (09/05/2023), Essential hypertension (09/05/2023), IFG (impaired fasting glucose) (09/05/2023), Meningioma (Multi) (09/05/2023), Neuropathy (09/05/2023), Personal history of malignant neoplasm of breast (09/05/2023), Primary osteoarthritis involving multiple joints (12/29/2023), and Vertigo (12/29/2023).    Surgical History  She has a past surgical history that includes Breast lumpectomy (2017) and Joint replacement (Left, 05/21/2024).     Social History  She reports that she has never smoked. She has never been exposed to tobacco smoke. She has never used smokeless tobacco. She reports current alcohol use of about 3.0 standard drinks of alcohol per week. She reports  that she does not use drugs.    Family History  Family History   Problem Relation Name Age of Onset    Heart disease Mother Eunice Denis     Hypertension Mother Eunice Denis     Cancer Father Layo Denis         Allergies  Penicillins    Review of Systems  CONSTITUTIONAL:   Negative for weight change, loss of appetite, fatigue, weakness, fever, chills, night sweats, headaches .           HEENT:   Negative for cold, cough, sore throat, sinus pain, swollen lymph nodes.           OPHTHALMOLOGY:   Negative for diminished vision, blurred vision, loss of vision, double vision.           ALLERGY:   Negative for runny nose, scratchy throat, sinus congestion, rash, facial pressure, nasal congestion, post-nasal drip.           CARDIOLOGY:   Negative for chest pain, palpitations, murmurs, irregular heart beat, shortness of breath, leg edema, dyspnea on exertion, fatigue, dizziness.           RESPIRATORY:   Negative for chest pain, shortness of breath, swelling of the legs, asthma/copd, chest congestion, pain with breathing .           GASTROENTEROLOGY:   Negative for nausea, vomitting, heartburn, constipation, diarrhea, blood in stool, change in bowel habits, black stool.           HEMATOLOGY/LYMPH:   Negative for fatigue, loss of appetitie, easy bruising, easy bleeding, anemia, abnormal bleeding, slow healing.           ENDOCRINOLOGY:   Negative for polyuria, polydipsia, polyphagia, fatigue, weight loss, weight gain, cold intolerance, heat intolerance, diabetes.           MUSCULOSKELETAL:   Positive  for Left ankle        DERMATOLOGY:   Negative for rash, bruising.           NEUROLOGY:   Negative for tingling, numbness, gait abnormality, paresthesias, weakness, sciatica.        Examination:  Left Lateral Lateral Malleolus and Fibula  Edema: Positive diffusely, especially laterally.   Ecchymosis/Bruising: Positive laterally.   Percussion Test: Positive laterally.   Tuning Fork Test: Positive laterally.     Orientation:     Asymmetrical, because of pain and swelling.            ROM:   Positive, Decreased.            Muscle Strength:   Positive +3/+5 Planar Flexion, Decreased because of pain and swelling  Positive +3/+5 Dorsi Flexion, Decreased because of pain and swelling         Positive +3/+5 Inversion, Decreased because of pain and swelling  Positive +3/+5 Eversion, Decreased because of pain and swelling          Positive +3/+5 Plantarflexion in combination with inversion, Decreased because of pain and swelling  +5/+5 Plantarflexion in combination with eversion          Positive +3/+5 Dorsiflexion in combination with inversion (Posterior Tibialis), Decreased because of pain and swelling  +5/+5 Dorsiflexion in combination with eversion (Peroneal Brevis)  +5/+5 Dorsiflexion in combination with eversion and flexion of great toe (Peroneal Longus).       Palpation:   Positive, Painful, Tender to Palpation lateral mallelous and ATF > CF > PTF.            Vascular:   +2/+4 Dorsalis Pedis  +2/+4 Posterior Tibialis  Capillary Refill < 2 Seconds.               Leg/Ankle/Foot - Ankle Impingement:  Posterior Ankle Impingement Test: Negative.   Anterior Ankle Impingement Test: Negative.         Leg/Ankle/Foot - Ankle Instability:  Flexibility Test:  Negative.   Anterior Drawer Test:  Positive   Talar Tilt Test:  Positive  External Rotation Kleiger Plantar Flexion Test:  Negative.   External Rotation Kleiger Dorsiflexion Test:  Negative.   Squeeze Test:  Positive  Dorsiflexion Test:  Negative.   Posterior Tibial Instability Test: Negative.  Peroneal Tendon Instability Test:  Positive  Horizontal Squeeze Test:  Negative.   Vertical Squeeze Test:  Negative.   Standing/Walking Test: Positive, Painful.   Hop Test:  Positive          Leg/Ankle/Foot - DVT:  Justina's Sign: Negative.            Leg/Ankle/Foot - Hindfoot Achilles/Calcaneus:  Kimble Compression Test: Negative.   Hoffa Sign: Negative.   Achilles Tendon Tap Test: Negative.   Heel  Compression Test: Negative.            Feet/Foot:   Positive  Left Valgus foot        Imaging and Diagnostics Review:  Xrays performed at [facility]. Pt instructed to bring in Report.  -----------------------------------------------  Right leg shorter than left leg by the following:  Iliac crest:  2.8 mm  Sacral base: 4.8 mm  Midline femoral heads:  level mm  Median difference of the right leg being shorter than left leg is approximately:  3.8 mm     Standing erect pelvis x-ray ordered for leg length discrepancy  =============================  X-rays of bilateral ankles show degenerative arthritic changes bilateral ankles significantly more in the left ankle over the lateral portion of the ankle joint as well as the talofibular articulation as well with os trigonum noted    Assessment   1. Chronic pain of left ankle        2. Moderate left ankle sprain, initial encounter        3. Arthritis of left ankle        4. Grade 2 ankle sprain        5. Valgus foot deformity, acquired, left        6. Leg length difference, acquired            Treatment or Intervention:  May continue to use ice and moist heat therapy as needed  ,   Start into Physical Therapy 1-2 times a week for 8-10 weeks with manual therapy as well as dry needling and IASTM  Reviewed home exercises to be performed by the patient routinely  , ,   Stressed the importance of wearing shoes with good stability control to help with the biomechanics affecting the lower legs  ,   Stressed the importance of wearing full foot insoles to help with the biomechanics affecting the lower legs  ,   Recommendation over-the-counter vitamin-D 2 -3000+ milligrams a day, as well as a daily multivitamin.  ,   Recommendation over-the-counter curcumin, turmeric, boswellia, as well as egg shell membrane as directed to aid with joint inflammation.  ,   Recommendation over-the-counter Move Free for joint health.  ,   May take OTC Tylenol Extra Strength or OTC Tylenol  Arthritis, taking one every 6-8 hours with food as needed for pain management., Patient advised regarding the risks and/or potential adverse reactions and/or side effects of any prescribed medications along with any over-the-counter medications or any supplements used. Patient advised to seek immediate medical care if any adverse reactions occur. The patient and/or patient(s) parent(s) verbalized their understanding.  ,   MRI of the left ankle to rule out ligament or tendon injury versus stress fracture versus other  Discussed in detail with the patient to the level of their understanding the possibility in the future of regenerative injections versus corticosteroid injections versus viscosupplementation injections versus a combination,   At the patient's next office visit, will provide appropriate 4.0 millimeter heel lift to be placed in the LEFT shoe to accommodate for leg length discrepancy found on standing erect pelvis xray  You have been ordered an MRI of the left ankle. Once you contact scheduling at [phone] and obtain the date and time of your MRI, contact our office at [phone] to schedule your follow-up appointment to review your results. Please note the results of your imaging will not be discussed over the telephone.   Follow-up after left ankle MRI or in 2-4 weeks for a reevaluation and possible xray or sooner as needed.    Please note that this report has been produced using speech recognition software.  It may contain errors related to grammar, punctuation or spelling.  Electronically signed, but not reviewed.  KEITH Martinez, Director of Sports Medicine      ANJELICA BUSTILLOS on 10/9/24 at 11:21 AM.     PRAVIN Martinez DO    No

## 2024-10-09 ENCOUNTER — OFFICE VISIT (OUTPATIENT)
Dept: SPORTS MEDICINE | Facility: CLINIC | Age: 74
End: 2024-10-09
Payer: MEDICARE

## 2024-10-09 ENCOUNTER — HOSPITAL ENCOUNTER (OUTPATIENT)
Dept: RADIOLOGY | Facility: CLINIC | Age: 74
Discharge: HOME | End: 2024-10-09
Payer: MEDICARE

## 2024-10-09 VITALS
BODY MASS INDEX: 29.19 KG/M2 | HEIGHT: 67 IN | DIASTOLIC BLOOD PRESSURE: 80 MMHG | HEART RATE: 70 BPM | WEIGHT: 186 LBS | SYSTOLIC BLOOD PRESSURE: 120 MMHG

## 2024-10-09 DIAGNOSIS — S93.409A GRADE 2 ANKLE SPRAIN: ICD-10-CM

## 2024-10-09 DIAGNOSIS — M21.70 LEG LENGTH DIFFERENCE, ACQUIRED: ICD-10-CM

## 2024-10-09 DIAGNOSIS — G89.29 CHRONIC PAIN OF LEFT ANKLE: ICD-10-CM

## 2024-10-09 DIAGNOSIS — S93.402A MODERATE LEFT ANKLE SPRAIN, INITIAL ENCOUNTER: ICD-10-CM

## 2024-10-09 DIAGNOSIS — M19.072 ARTHRITIS OF LEFT ANKLE: ICD-10-CM

## 2024-10-09 DIAGNOSIS — M21.072 VALGUS FOOT DEFORMITY, ACQUIRED, LEFT: ICD-10-CM

## 2024-10-09 DIAGNOSIS — M25.572 CHRONIC PAIN OF LEFT ANKLE: ICD-10-CM

## 2024-10-09 DIAGNOSIS — M25.571 ACUTE RIGHT ANKLE PAIN: ICD-10-CM

## 2024-10-09 PROCEDURE — 3079F DIAST BP 80-89 MM HG: CPT | Performed by: FAMILY MEDICINE

## 2024-10-09 PROCEDURE — 72170 X-RAY EXAM OF PELVIS: CPT | Performed by: RADIOLOGY

## 2024-10-09 PROCEDURE — 1159F MED LIST DOCD IN RCRD: CPT | Performed by: FAMILY MEDICINE

## 2024-10-09 PROCEDURE — 73600 X-RAY EXAM OF ANKLE: CPT | Mod: 50

## 2024-10-09 PROCEDURE — 1125F AMNT PAIN NOTED PAIN PRSNT: CPT | Performed by: FAMILY MEDICINE

## 2024-10-09 PROCEDURE — 99204 OFFICE O/P NEW MOD 45 MIN: CPT | Performed by: FAMILY MEDICINE

## 2024-10-09 PROCEDURE — 3074F SYST BP LT 130 MM HG: CPT | Performed by: FAMILY MEDICINE

## 2024-10-09 PROCEDURE — 1036F TOBACCO NON-USER: CPT | Performed by: FAMILY MEDICINE

## 2024-10-09 PROCEDURE — 99214 OFFICE O/P EST MOD 30 MIN: CPT | Performed by: FAMILY MEDICINE

## 2024-10-09 PROCEDURE — 72170 X-RAY EXAM OF PELVIS: CPT

## 2024-10-09 ASSESSMENT — PAIN SCALES - GENERAL
PAINLEVEL: 6
PAINLEVEL_OUTOF10: 6

## 2024-10-09 ASSESSMENT — PATIENT HEALTH QUESTIONNAIRE - PHQ9
2. FEELING DOWN, DEPRESSED OR HOPELESS: NOT AT ALL
SUM OF ALL RESPONSES TO PHQ9 QUESTIONS 1 AND 2: 0
1. LITTLE INTEREST OR PLEASURE IN DOING THINGS: NOT AT ALL

## 2024-10-09 ASSESSMENT — ENCOUNTER SYMPTOMS
OCCASIONAL FEELINGS OF UNSTEADINESS: 0
DEPRESSION: 0
LOSS OF SENSATION IN FEET: 0

## 2024-10-09 ASSESSMENT — COLUMBIA-SUICIDE SEVERITY RATING SCALE - C-SSRS
6. HAVE YOU EVER DONE ANYTHING, STARTED TO DO ANYTHING, OR PREPARED TO DO ANYTHING TO END YOUR LIFE?: NO
1. IN THE PAST MONTH, HAVE YOU WISHED YOU WERE DEAD OR WISHED YOU COULD GO TO SLEEP AND NOT WAKE UP?: NO
2. HAVE YOU ACTUALLY HAD ANY THOUGHTS OF KILLING YOURSELF?: NO

## 2024-10-09 ASSESSMENT — PAIN - FUNCTIONAL ASSESSMENT: PAIN_FUNCTIONAL_ASSESSMENT: 0-10

## 2024-10-09 ASSESSMENT — PAIN DESCRIPTION - DESCRIPTORS: DESCRIPTORS: ACHING;POUNDING

## 2024-10-09 NOTE — PATIENT INSTRUCTIONS
May use PRICE therapy as needed  ,   Start into Physical Therapy 1-2 times a week for 8-10 weeks with manual therapy as well as dry needling and IASTM  , Reviewed home exercises to be performed by the patient routinely  , ,   Stressed the importance of wearing shoes with good stability control to help with the biomechanics affecting the lower legs  ,   Stressed the importance of wearing full foot insoles to help with the biomechanics affecting the lower legs  ,   Recommendation over-the-counter calcium with vitamin-D 2 -3000+ milligrams a day, as well as OTC symphytum as directed daily to promote bony healing, in addition to a daily multivitamin.  ,   Recommendation over-the-counter curcumin, turmeric, boswellia, as well as egg shell membrane as directed to aid with joint inflammation.  ,   Recommendation over-the-counter Move Free for joint health.  ,   May take OTC Tylenol Extra Strength or OTC Tylenol Arthritis, taking one every 6-8 hours with food as needed for pain management., Patient advised regarding the risks and/or potential adverse reactions and/or side effects of any prescribed medications along with any over-the-counter medications or any supplements used. Patient advised to seek immediate medical care if any adverse reactions occur. The patient and/or patient(s) parent(s) verbalized their understanding.  ,   Discussed in detail with the patient to the level of their understanding the possibility in the future of left ankle to rule out ligament or tendon injury versus stress fracture versus other  ,   Discussed in detail with the patient to the level of their understanding the possibility in the future of regenerative injections versus corticosteroid injections  ,   At the patient's next office visit, will provide appropriate ___ millimeter heel lift to be placed in the ____ shoe to accommodate for leg length discrepancy found on standing erect pelvis xray  You have been ordered an MRI of the left  ankle. Once you contact scheduling at [phone] and obtain the date and time of your MRI, contact our office at [phone] to schedule your follow-up appointment to review your results. Please note the results of your imaging will not be discussed over the telephone.   Follow-up after left ankle MRI or in 2-4 weeks for a reevaluation and possible xray or sooner as needed

## 2024-10-14 ENCOUNTER — HOSPITAL ENCOUNTER (OUTPATIENT)
Dept: RADIOLOGY | Facility: CLINIC | Age: 74
Discharge: HOME | End: 2024-10-14
Payer: MEDICARE

## 2024-10-14 DIAGNOSIS — M21.072 VALGUS FOOT DEFORMITY, ACQUIRED, LEFT: ICD-10-CM

## 2024-10-14 DIAGNOSIS — S93.409A GRADE 2 ANKLE SPRAIN: ICD-10-CM

## 2024-10-14 DIAGNOSIS — S93.402A MODERATE LEFT ANKLE SPRAIN, INITIAL ENCOUNTER: ICD-10-CM

## 2024-10-14 DIAGNOSIS — M19.072 ARTHRITIS OF LEFT ANKLE: ICD-10-CM

## 2024-10-14 DIAGNOSIS — M21.70 LEG LENGTH DIFFERENCE, ACQUIRED: ICD-10-CM

## 2024-10-14 DIAGNOSIS — M25.572 CHRONIC PAIN OF LEFT ANKLE: ICD-10-CM

## 2024-10-14 DIAGNOSIS — G89.29 CHRONIC PAIN OF LEFT ANKLE: ICD-10-CM

## 2024-10-14 PROCEDURE — 73721 MRI JNT OF LWR EXTRE W/O DYE: CPT | Mod: LT

## 2024-10-18 NOTE — PROGRESS NOTES
Verbal consent of the patient and/or verbal parental consent for patients under the age of 18 have been obtained to conduct a physical examination at this office visit.    New patient  History Of Present Illness  10/22/24 eLanne Mims is a 74 y.o. female who presents for MRI review of their Left ankle. States that she is not able to get into PT until December. States that she is feeling the same as when she was last seen. She arrives wearing her ankle brace. She states no new or worsening symptoms.   She does not want surgical intervention especially and ankle fusion.  She says it does not give her enough pain that she feels require surgery at this time however she would be interested in the regenerative injections as well as the joint lubrication injections if they would help.      All previous Progress Notes and imaging results related to this patients chief complaint have been reviewed in preparation for this examination.    Past Medical History  She has a past medical history of Anxiety (09/05/2023), Cataract (2023), Dizziness (12/29/2023), Elevated cholesterol (09/05/2023), Essential hypertension (09/05/2023), IFG (impaired fasting glucose) (09/05/2023), Meningioma (Multi) (09/05/2023), Neuropathy (09/05/2023), Personal history of malignant neoplasm of breast (09/05/2023), Primary osteoarthritis involving multiple joints (12/29/2023), and Vertigo (12/29/2023).    Surgical History  She has a past surgical history that includes Breast lumpectomy (2017) and Joint replacement (Left, 05/21/2024).     Social History  She reports that she has never smoked. She has never been exposed to tobacco smoke. She has never used smokeless tobacco. She reports current alcohol use of about 3.0 standard drinks of alcohol per week. She reports that she does not use drugs.    Family History  Family History   Problem Relation Name Age of Onset    Heart disease Mother Eunice Denis     Hypertension Mother Eunice Denis     Cancer  Father Layo Denis    History Of Present Illness  10/09/24 Leanne Mims is a 74 y.o. female who presents for an evaluation of their Left ankle. Arrives with her significant other Elliot. States that 5 years ago she sprained her ankle on vacation and was able to tolerate walking on it with an ankle brace. Has been a minor discomfort since then. In May she had a TKR on the left leg and has been and since then the left ankle pain has resurfaced and worsened. She states a 6/10 grinding type pain. She wears a supportive ankle brace regularly. She was seen by [facility] and had XR completed on 9/27 where they found arthritic changes. States that she will take advil prn for pain management. States that she goes up and down stairs one leg at a time. States that her pain is relatively the same regardless of activity.  Allergies  Penicillins    Review of Systems  CONSTITUTIONAL:   Negative for weight change, loss of appetite, fatigue, weakness, fever, chills, night sweats, headaches .           HEENT:   Negative for cold, cough, sore throat, sinus pain, swollen lymph nodes.           OPHTHALMOLOGY:   Negative for diminished vision, blurred vision, loss of vision, double vision.           ALLERGY:   Negative for runny nose, scratchy throat, sinus congestion, rash, facial pressure, nasal congestion, post-nasal drip.           CARDIOLOGY:   Negative for chest pain, palpitations, murmurs, irregular heart beat, shortness of breath, leg edema, dyspnea on exertion, fatigue, dizziness.           RESPIRATORY:   Negative for chest pain, shortness of breath, swelling of the legs, asthma/copd, chest congestion, pain with breathing .           GASTROENTEROLOGY:   Negative for nausea, vomitting, heartburn, constipation, diarrhea, blood in stool, change in bowel habits, black stool.           HEMATOLOGY/LYMPH:   Negative for fatigue, loss of appetitie, easy bruising, easy bleeding, anemia, abnormal bleeding, slow healing.            ENDOCRINOLOGY:   Negative for polyuria, polydipsia, polyphagia, fatigue, weight loss, weight gain, cold intolerance, heat intolerance, diabetes.           MUSCULOSKELETAL:   Positive  for Left ankle        DERMATOLOGY:   Negative for rash, bruising.           NEUROLOGY:   Negative for tingling, numbness, gait abnormality, paresthesias, weakness, sciatica.        Examination: All findings slight improvement since last visit  Left Lateral Lateral Malleolus and Fibula  Edema: Positive diffusely, especially laterally.   Ecchymosis/Bruising: Negative  Percussion: Positive laterally.    Tuning Fork Test: Positive laterally.     Orientation:  All findings slight improvement since last visit  Asymmetrical, because of pain and swelling.            ROM: All findings slight improvement since last visit  Positive, Decreased.            Muscle Strength: All findings slight improvement since last visit  Positive +3/+5 Planar Flexion, still Decreased because of pain and swelling  Positive +3/+5 Dorsi Flexion, still Decreased because of pain and swelling         Positive +3/+5 Inversion, still Decreased because of pain and swelling  Positive +3/+5 Eversion, still Decreased because of pain and swelling          Positive +3/+5 Plantarflexion in combination with inversion,still  Decreased because of pain and swelling  +5/+5 Plantarflexion in combination with eversion          Positive +3/+5 Dorsiflexion in combination with inversion (Posterior Tibialis),still  Decreased because of pain and swelling  +5/+5 Dorsiflexion in combination with eversion (Peroneal Brevis)  +5/+5 Dorsiflexion in combination with eversion and flexion of great toe (Peroneal Longus).       Palpation: All findings slight improvement since last visit  Positive,  to Palpation lateral mallelous and ATF > CF > PTF.            Vascular:   +2/+4 Dorsalis Pedis  +2/+4 Posterior Tibialis  Capillary Refill < 2 Seconds.               Leg/Ankle/Foot - Ankle  Impingement:  Posterior Ankle Impingement Test: Negative.   Anterior Ankle Impingement Test: Negative.         Leg/Ankle/Foot - Ankle Instability:All findings slight improvement since last visit  Flexibility Test:  Negative.   Anterior Drawer Test:  Positive   Talar Tilt Test:  Positive  External Rotation Kleiger Plantar Flexion Test:  Negative.   External Rotation Kleiger Dorsiflexion Test:  Negative.   Squeeze Test:  Positive  Dorsiflexion Test:  Negative.   Posterior Tibial Instability Test: Negative.  Peroneal Tendon Instability Test:  Positive  Horizontal Squeeze Test:  Negative.   Vertical Squeeze Test:  Negative.   Standing/Walking Test: Positive, Painful.   Hop Test:  Positive          Leg/Ankle/Foot - DVT:  Justina's Sign: Negative.            Leg/Ankle/Foot - Hindfoot Achilles/Calcaneus:  Kimble Compression Test: Negative.   Hoffa Sign: Negative.   Achilles Tendon Tap Test: Negative.   Heel Compression Test: Negative.            Feet/Foot:   Positive  Left Valgus foot        Imaging and Diagnostics Review:  nterpreted By:  Anali Escobedo,   STUDY:  MR ANKLE LEFT WO IV CONTRAST; ;  10/14/2024 8:36 am      INDICATION:  Signs/Symptoms:LEFT ANKLE PAIN.      ,M25.572 Pain in left ankle and joints of left foot,G89.29 Other  chronic pain,S93.402A Sprain of unspecified ligament of left ankle,  initial encounter,M19.072 Primary osteoarthritis, left ankle and  foot,S93.409A Sprain of unspecified ligament of unspecified ankle,  initial encounter,M21.072 Valgus deformity, not elsewhere classified,  left ankle,M21.70 Unequal limb length (acquired), unspecified site      COMPARISON:  X-ray 10/09/2024      ACCESSION NUMBER(S):  [id number]      ORDERING CLINICIAN:  ASHLEY BARKER      TECHNIQUE:  Multiplanar multisequence MRI obtained of the left ankle.      FINDINGS:  Ankle joint demonstrates a moderate effusion with intermediate signal  intensity and debris/synovial hypertrophy about the articulation.  There is severe  loss of articular cartilage with mild remodeling of  the subchondral bone plate in particular lateral talar dome as well  as corresponding lateral tibial plafond. Mild reactive marrow edema  demonstrated in this area. Moderate fluid in the common posterior  recess of the ankle joint with intra-articular bodies demonstrated.  Of note, there is fluid tracking along the flexor hallucis longus  from the ankle joint. There is a moderate subtalar joint effusion  with edema and fluid tracking along the inferior extensor retinaculum  in the sinus tarsi. Reactive marrow edema demonstrated in the lateral  process of the talus.      Anterior inferior tibiofibular ligament demonstrates sequela of  chronic sprain. Posterior inferior tibiofibular ligament is intact.  Distal interosseous tibiofibular ligament is intact.      Anterior talofibular ligament demonstrates sequela of chronic tear  with remote well corticated ossific fragments from the lateral  malleolus about the tip with largest fragment identified measuring 7  mm. Calcaneofibular ligament demonstrates sequela of chronic sprain.  The ligament is seen about the previously noted areas of ossific  fragment. However, no focal discontinuity in the current examination.  Posterior talofibular ligament is intact.      Posterior tibiotalar portion deep deltoid ligament demonstrates  sequela of chronic sprain with osseous irregularity about the  colliculus. No focal discontinuity demonstrated. Visualized  components superficial deltoid ligament demonstrates sequela of  chronic sprain with chronic thickened tibia navicular portion with  spring ligament complex demonstrates chronic thickened superomedial  portion.      Sinus tarsi demonstrates generalized edema.      Posterior tibial tendon is intact. Flexor digitorum and flexor  hallucis longus tendons are intact. Fluid seen tracking along the  flexor hallucis longus tendon sheath. Tarsal tunnel is intact.      Peroneus brevis  and longus are dislocated from the fibular groove  demonstrated along the outer margin of the lateral malleolus in  relation to remote tear of the superior peroneal retinaculum. The  peroneus longus tendon demonstrates mild tendinosis with moderate  peroneus brevis tendinosis. Low-grade partial superficial tear  demonstrated. Tibialis anterior and extensor tendons intact      Achilles tendon intact. Chronic plantar fasciitis demonstrated with  chronic thickened central and lateral cords with moderate calcaneal  spurring demonstrated. Musculature of the foot demonstrates diffuse  atrophy. Midfoot osseous structures demonstrate component of  intertarsal osteoarthritis. There is Lisfranc joint osteoarthritis  with 2nd and 3rd tarsometatarsal joint osteoarthritis demonstrated.  Visualized metatarsal bases are unremarkable.          IMPRESSION:  1. Severe ankle joint osteoarthritis with moderate effusion and  intra-articular debris/synovial hypertrophy. Reactive marrow edema  about the articulation. Moderate subtalar joint effusion demonstrated.  2. Dislocation of the peroneal tendons from the fibular groove in  relation to torn superior peroneal retinaculum. There is tendinosis  and low-grade partial superficial tear. No tenosynovitis or focal  tendon tear.  3. Chronic medial and lateral collateral ligament sprain. Remote  cortical avulsion fragments in particular about the lateral malleolus.  4. Chronic high ankle sprain  5. Chronic plantar fasciitis        Signed by: Anali Escobedo 10/14/2024 10:38 AM  Dictation workstation:   [id number]  ------------------------------------------------  XR ANKLE BILATERAL 2 VIEWS; ;  10/9/2024 11:50 am      INDICATION:  Signs/Symptoms:left ankle pain, right ankle pain..      ,M25.572 Pain in left ankle and joints of left foot,G89.29 Other  chronic pain,S93.402A Sprain of unspecified ligament of left ankle,  initial encounter,M19.072 Primary osteoarthritis, left ankle  and  foot,S93.409A Sprain of unspecified ligament of unspecified ankle,  initial encounter,M21.072 Valgus deformity, not elsewhere classified,  left ankle,M21.70 Unequal limb length (acquired), unspecified site      COMPARISON:  None.      ACCESSION NUMBER(S):  [id number]      ORDERING CLINICIAN:  ASHLEY BARKER      FINDINGS:  Bilateral ankles, two views of each      There is severe joint space narrowing with osteophytosis and  sclerosis involving the left ankle. There is hindfoot valgus  deformity. There is bimalleolar soft tissue edema. No acute fracture  dislocation seen.      No abnormality seen on the right in the ankle. There is moderate  severe joint space narrowing osteophytosis in the right midfoot  articulations of the naviculocuneiform and tarsometatarsal joints      IMPRESSION:  Severe ankle osteoarthritis on the left   hindfoot valgus  3. Moderate severe right midfoot osteoarthritis.   4. No abnormality seen in the right ankle      MACRO:  None      Signed by: Scot Tabares 10/10/2024 7:48 PM  -----------------------------------------------  Right leg shorter than left leg by the following:  Iliac crest:  2.8 mm  Sacral base: 4.8 mm  Midline femoral heads:  level mm  Median difference of the right leg being shorter than left leg is approximately:  3.8 mm     XR PELVIS 1-2 VIEWS; ;  10/9/2024 11:50 am      INDICATION:  Signs/Symptoms:PELVIC PAIN.      ,M25.572 Pain in left ankle and joints of left foot,G89.29 Other  chronic pain,M21.70 Unequal limb length (acquired), unspecified site      COMPARISON:  None.      ACCESSION NUMBER(S):  [id number]      ORDERING CLINICIAN:  ASHLEY BARKER      FINDINGS:  Pelvis, single view      There is no fracture or dislocation. Mild degenerative changes in the  hips with osteophytosis      IMPRESSION:  No acute abnormality. Mild osteophytosis in the hips          MACRO:  None      Signed by: Scot Tabares 10/10/2024 7:46 PM  Assessment   1. Peroneal tendon injury,  left, subsequent encounter        2. Chronic pain of left ankle        3. Moderate left ankle sprain, initial encounter        4. DJD (degenerative joint disease), ankle and foot, left        5. Grade 2 ankle sprain        6. Valgus foot deformity, acquired, left        7. Leg length difference, acquired        8. Acute right ankle pain        9. High ankle sprain of left lower extremity, subsequent encounter        10. Plantar fasciitis of left foot              Treatment or Intervention:  May continue to use ice and moist heat therapy as needed  ,   Continue physical Therapy 1-2 times a week for 8-10 weeks with manual therapy as well as dry needling and IASTM  Once again reviewed home exercises to be performed by the patient routinely  , ,   Once again Stressed the importance of wearing shoes with good stability control to help with the biomechanics affecting the lower legs  ,   Once again Stressed the importance of wearing full foot insoles to help with the biomechanics affecting the lower legs  ,   Once again Recommendation over-the-counter vitamin-D 2 -3000+ milligrams a day, as well as a daily multivitamin.  ,   Once again Recommendation over-the-counter curcumin, turmeric, boswellia, as well as egg shell membrane as directed to aid with joint inflammation.  ,   Once again Recommendation over-the-counter Move Free for joint health.  ,   May continue to take OTC Tylenol Extra Strength or OTC Tylenol Arthritis, taking one every 6-8 hours with food as needed for pain management., Patient advised regarding the risks and/or potential adverse reactions and/or side effects of any prescribed medications along with any over-the-counter medications or any supplements used. Patient advised to seek immediate medical care if any adverse reactions occur. The patient and/or patient(s) parent(s) verbalized their understanding.  ,   Discussed in detail with the patient to the level of their understanding the possibility in the  future of regenerative injections versus  viscosupplementation injections versus a combination,   Pt given heel lift to try using 9mm heel lift in the right shoe. If it feels too much, peel off layer to make it 6mm. If that feels too much then peel off another layer to make the heel lift 3mm  Follow-up tomorrow for Prolozone injection #1 into the left foot and ankle    Please note that this report has been produced using speech recognition software.  It may contain errors related to grammar, punctuation or spelling.  Electronically signed, but not reviewed.  KEITH Martinez, Director of Sports Medicine      ASHLEY BARKER on 10/22/24 at 11:47 AM.     PRAVIN Martinez DO

## 2024-10-22 ENCOUNTER — OFFICE VISIT (OUTPATIENT)
Dept: SPORTS MEDICINE | Facility: CLINIC | Age: 74
End: 2024-10-22
Payer: MEDICARE

## 2024-10-22 DIAGNOSIS — S93.409A GRADE 2 ANKLE SPRAIN: ICD-10-CM

## 2024-10-22 DIAGNOSIS — S93.402A MODERATE LEFT ANKLE SPRAIN, INITIAL ENCOUNTER: ICD-10-CM

## 2024-10-22 DIAGNOSIS — M21.072 VALGUS FOOT DEFORMITY, ACQUIRED, LEFT: ICD-10-CM

## 2024-10-22 DIAGNOSIS — M19.072 DJD (DEGENERATIVE JOINT DISEASE), ANKLE AND FOOT, LEFT: ICD-10-CM

## 2024-10-22 DIAGNOSIS — S93.492D HIGH ANKLE SPRAIN OF LEFT LOWER EXTREMITY, SUBSEQUENT ENCOUNTER: ICD-10-CM

## 2024-10-22 DIAGNOSIS — M25.571 ACUTE RIGHT ANKLE PAIN: ICD-10-CM

## 2024-10-22 DIAGNOSIS — M21.70 LEG LENGTH DIFFERENCE, ACQUIRED: ICD-10-CM

## 2024-10-22 DIAGNOSIS — S86.302D PERONEAL TENDON INJURY, LEFT, SUBSEQUENT ENCOUNTER: Primary | ICD-10-CM

## 2024-10-22 DIAGNOSIS — M25.572 CHRONIC PAIN OF LEFT ANKLE: ICD-10-CM

## 2024-10-22 DIAGNOSIS — M72.2 PLANTAR FASCIITIS OF LEFT FOOT: ICD-10-CM

## 2024-10-22 DIAGNOSIS — G89.29 CHRONIC PAIN OF LEFT ANKLE: ICD-10-CM

## 2024-10-22 PROBLEM — S93.492A HIGH ANKLE SPRAIN OF LEFT LOWER EXTREMITY: Status: ACTIVE | Noted: 2024-10-22

## 2024-10-22 PROCEDURE — 99214 OFFICE O/P EST MOD 30 MIN: CPT | Performed by: FAMILY MEDICINE

## 2024-10-22 PROCEDURE — 1036F TOBACCO NON-USER: CPT | Performed by: FAMILY MEDICINE

## 2024-10-22 PROCEDURE — 1159F MED LIST DOCD IN RCRD: CPT | Performed by: FAMILY MEDICINE

## 2024-10-22 ASSESSMENT — PATIENT HEALTH QUESTIONNAIRE - PHQ9
SUM OF ALL RESPONSES TO PHQ9 QUESTIONS 1 AND 2: 0
2. FEELING DOWN, DEPRESSED OR HOPELESS: NOT AT ALL
1. LITTLE INTEREST OR PLEASURE IN DOING THINGS: NOT AT ALL

## 2024-10-22 ASSESSMENT — ENCOUNTER SYMPTOMS
DEPRESSION: 0
LOSS OF SENSATION IN FEET: 0
OCCASIONAL FEELINGS OF UNSTEADINESS: 0

## 2024-10-22 NOTE — PATIENT INSTRUCTIONS
May continue to use ice and moist heat therapy as needed  ,   Start into Physical Therapy 1-2 times a week for 8-10 weeks with manual therapy as well as dry needling and IASTM  Reviewed home exercises to be performed by the patient routinely  , ,   Stressed the importance of wearing shoes with good stability control to help with the biomechanics affecting the lower legs  ,   Stressed the importance of wearing full foot insoles to help with the biomechanics affecting the lower legs  ,   Recommendation over-the-counter vitamin-D 2 -3000+ milligrams a day, as well as a daily multivitamin.  ,   Recommendation over-the-counter curcumin, turmeric, boswellia, as well as egg shell membrane as directed to aid with joint inflammation.  ,   Recommendation over-the-counter Move Free for joint health.  ,   May take OTC Tylenol Extra Strength or OTC Tylenol Arthritis, taking one every 6-8 hours with food as needed for pain management., Patient advised regarding the risks and/or potential adverse reactions and/or side effects of any prescribed medications along with any over-the-counter medications or any supplements used. Patient advised to seek immediate medical care if any adverse reactions occur. The patient and/or patient(s) parent(s) verbalized their understanding.  ,   MRI of the left ankle to rule out ligament or tendon injury versus stress fracture versus other  Discussed in detail with the patient to the level of their understanding the possibility in the future of regenerative injections versus corticosteroid injections versus viscosupplementation injections versus a combination,   Pt to try using 9mm heel lift in the right shoe. If it feels too much, peel off layer to make it 6mm. If that feels too much then peel off another layer to make the heel lift 3mm  Follow-up 6-8 weeks

## 2024-10-22 NOTE — PROGRESS NOTES
Verbal consent of the patient and/or verbal parental consent for patients under the age of 18 have been obtained to conduct a physical examination at this office visit.    New patient  History Of Present Illness  10/24/24 Leanne Mims is a 74 y.o. female who presents for Prolozone #1 inj of their Left ankle. She is unchanged from last seen yesterday she says she is excited to try these injections because of how good she heard they did for some of her friends especially her significant other Al unchanged since previous visit unchanged since previous visitvin.  She still would like to try to avoid surgical intervention is much as possible because she does not want an ankle fusion whatsoever with some of the surgeons have told her.    She says it is uncomfortable but she is able to deal with it.  Unchanged since previous visit      All previous Progress Notes and imaging results related to this patients chief complaint have been reviewed in preparation for this examination.    Past Medical History  She has a past medical history of Anxiety (09/05/2023), Cataract (2023), Dizziness (12/29/2023), Elevated cholesterol (09/05/2023), Essential hypertension (09/05/2023), IFG (impaired fasting glucose) (09/05/2023), Meningioma (Multi) (09/05/2023), Neuropathy (09/05/2023), Personal history of malignant neoplasm of breast (09/05/2023), Primary osteoarthritis involving multiple joints (12/29/2023), and Vertigo (12/29/2023).    Surgical History  She has a past surgical history that includes Breast lumpectomy (2017) and Joint replacement (Left, 05/21/2024).     Social History  She reports that she has never smoked. She has never been exposed to tobacco smoke. She has never used smokeless tobacco. She reports current alcohol use of about 3.0 standard drinks of alcohol per week. She reports that she does not use drugs.    Family History  Family History   Problem Relation Name Age of Onset    Heart disease Mother Eunice Denis      Hypertension Mother Eunice Denis     Cancer Father Layo Denis    History Of Present Illness  10/09/24 Leanne Mims is a 74 y.o. female who presents for an evaluation of their Left ankle. Arrives with her significant other Elliot. States that 5 years ago she sprained her ankle on vacation and was able to tolerate walking on it with an ankle brace. Has been a minor discomfort since then. In May she had a TKR on the left leg and has been and since then the left ankle pain has resurfaced and worsened. She states a 6/10 grinding type pain. She wears a supportive ankle brace regularly. She was seen by [facility] and had XR completed on 9/27 where they found arthritic changes. States that she will take advil prn for pain management. States that she goes up and down stairs one leg at a time. States that her pain is relatively the same regardless of activity.  ----------------------------------------  10/22/24 Leanne Mims is a 74 y.o. female who presents for MRI review of their Left ankle. States that she is not able to get into PT until December. States that she is feeling the same as when she was last seen. She arrives wearing her ankle brace. She states no new or worsening symptoms.   She does not want surgical intervention especially and ankle fusion.  She says it does not give her enough pain that she feels require surgery at this time however she would be interested in the regenerative injections as well as the joint lubrication injections if they would help.   Allergies  Penicillins    Review of Systems  CONSTITUTIONAL:   Negative for weight change, loss of appetite, fatigue, weakness, fever, chills, night sweats, headaches .           HEENT:   Negative for cold, cough, sore throat, sinus pain, swollen lymph nodes.           OPHTHALMOLOGY:   Negative for diminished vision, blurred vision, loss of vision, double vision.           ALLERGY:   Negative for runny nose, scratchy throat, sinus congestion, rash,  facial pressure, nasal congestion, post-nasal drip.           CARDIOLOGY:   Negative for chest pain, palpitations, murmurs, irregular heart beat, shortness of breath, leg edema, dyspnea on exertion, fatigue, dizziness.           RESPIRATORY:   Negative for chest pain, shortness of breath, swelling of the legs, asthma/copd, chest congestion, pain with breathing .           GASTROENTEROLOGY:   Negative for nausea, vomitting, heartburn, constipation, diarrhea, blood in stool, change in bowel habits, black stool.           HEMATOLOGY/LYMPH:   Negative for fatigue, loss of appetitie, easy bruising, easy bleeding, anemia, abnormal bleeding, slow healing.           ENDOCRINOLOGY:   Negative for polyuria, polydipsia, polyphagia, fatigue, weight loss, weight gain, cold intolerance, heat intolerance, diabetes.           MUSCULOSKELETAL:   Positive  for Left ankle        DERMATOLOGY:   Negative for rash, bruising.           NEUROLOGY:   Negative for tingling, numbness, gait abnormality, paresthesias, weakness, sciatica.        Examination: All findings unchanged since previous visit  Left Lateral Lateral Malleolus and Fibula  Edema: Positive diffusely, especially laterally.   Ecchymosis/Bruising: Negative  Percussion: Positive laterally.    Tuning Fork Test: Positive laterally.     Orientation: All findings unchanged since previous visit  Asymmetrical, because swelling.            ROM: All findings unchanged since previous visitt  Positive, Decreased.  Plantarflexion and dorsiflexion as well as inversion and eversion as well as them in combination because of significant arthritic changes           Muscle Strength: All findings unchanged since previous visit  Positive +3/+5 Planar Flexion, still Decreased because of pain and swelling  Positive +3/+5 Dorsi Flexion, still Decreased because of pain and swelling         Positive +3/+5 Inversion, still Decreased because of pain and swelling  Positive +3/+5 Eversion, still Decreased  because of pain and swelling          Positive +3/+5 Plantarflexion in combination with inversion,still  Decreased because of pain and swelling  +5/+5 Plantarflexion in combination with eversion          Positive +3/+5 Dorsiflexion in combination with inversion (Posterior Tibialis),still  Decreased because of pain and swelling  +5/+5 Dorsiflexion in combination with eversion (Peroneal Brevis)  +5/+5 Dorsiflexion in combination with eversion and flexion of great toe (Peroneal Longus).       Palpation:All findings unchanged since previous visit  Positive,  to Palpation lateral mallelous and ATF > CF > PTF.  And additionally anterior inferior tib-fib ligament           Vascular:   +2/+4 Dorsalis Pedis  +2/+4 Posterior Tibialis  Capillary Refill < 2 Seconds.               Leg/Ankle/Foot - Ankle Impingement:  Posterior Ankle Impingement Test: Negative.   Anterior Ankle Impingement Test: Negative.         Leg/Ankle/Foot - Ankle InstabilityAll findings unchanged since previous visit  Flexibility Test:  Negative.   Anterior Drawer Test:  Positive   Talar Tilt Test:  Positive  External Rotation Kleiger Plantar Flexion Test:  Negative.   External Rotation Kleiger Dorsiflexion Test:  Negative.   Squeeze Test:  Positive  Dorsiflexion Test:  Negative.   Posterior Tibial Instability Test: Negative.  Peroneal Tendon Instability Test:  Positive  Horizontal Squeeze Test:  Negative.   Vertical Squeeze Test:  Negative.   Standing/Walking Test: Positive, Painful.   Hop Test:  Positive          Leg/Ankle/Foot - DVT:  Justina's Sign: Negative.            Leg/Ankle/Foot - Hindfoot Achilles/Calcaneus:  Kimble Compression Test: Negative.   Hoffa Sign: Negative.   Achilles Tendon Tap Test: Negative.   Heel Compression Test: Negative.            Feet/Foot:   Positive  Left Valgus foot        Imaging and Diagnostics Review:  nterpreted By:  Anali Escobedo,   STUDY:  MR ANKLE LEFT WO IV CONTRAST; ;  10/14/2024 8:36 am       INDICATION:  Signs/Symptoms:LEFT ANKLE PAIN.      ,M25.572 Pain in left ankle and joints of left foot,G89.29 Other  chronic pain,S93.402A Sprain of unspecified ligament of left ankle,  initial encounter,M19.072 Primary osteoarthritis, left ankle and  foot,S93.409A Sprain of unspecified ligament of unspecified ankle,  initial encounter,M21.072 Valgus deformity, not elsewhere classified,  left ankle,M21.70 Unequal limb length (acquired), unspecified site      COMPARISON:  X-ray 10/09/2024      ACCESSION NUMBER(S):  [id number]      ORDERING CLINICIAN:  ASHLEY BARKER      TECHNIQUE:  Multiplanar multisequence MRI obtained of the left ankle.      FINDINGS:  Ankle joint demonstrates a moderate effusion with intermediate signal  intensity and debris/synovial hypertrophy about the articulation.  There is severe loss of articular cartilage with mild remodeling of  the subchondral bone plate in particular lateral talar dome as well  as corresponding lateral tibial plafond. Mild reactive marrow edema  demonstrated in this area. Moderate fluid in the common posterior  recess of the ankle joint with intra-articular bodies demonstrated.  Of note, there is fluid tracking along the flexor hallucis longus  from the ankle joint. There is a moderate subtalar joint effusion  with edema and fluid tracking along the inferior extensor retinaculum  in the sinus tarsi. Reactive marrow edema demonstrated in the lateral  process of the talus.      Anterior inferior tibiofibular ligament demonstrates sequela of  chronic sprain. Posterior inferior tibiofibular ligament is intact.  Distal interosseous tibiofibular ligament is intact.      Anterior talofibular ligament demonstrates sequela of chronic tear  with remote well corticated ossific fragments from the lateral  malleolus about the tip with largest fragment identified measuring 7  mm. Calcaneofibular ligament demonstrates sequela of chronic sprain.  The ligament is seen about the  previously noted areas of ossific  fragment. However, no focal discontinuity in the current examination.  Posterior talofibular ligament is intact.      Posterior tibiotalar portion deep deltoid ligament demonstrates  sequela of chronic sprain with osseous irregularity about the  colliculus. No focal discontinuity demonstrated. Visualized  components superficial deltoid ligament demonstrates sequela of  chronic sprain with chronic thickened tibia navicular portion with  spring ligament complex demonstrates chronic thickened superomedial  portion.      Sinus tarsi demonstrates generalized edema.      Posterior tibial tendon is intact. Flexor digitorum and flexor  hallucis longus tendons are intact. Fluid seen tracking along the  flexor hallucis longus tendon sheath. Tarsal tunnel is intact.      Peroneus brevis and longus are dislocated from the fibular groove  demonstrated along the outer margin of the lateral malleolus in  relation to remote tear of the superior peroneal retinaculum. The  peroneus longus tendon demonstrates mild tendinosis with moderate  peroneus brevis tendinosis. Low-grade partial superficial tear  demonstrated. Tibialis anterior and extensor tendons intact      Achilles tendon intact. Chronic plantar fasciitis demonstrated with  chronic thickened central and lateral cords with moderate calcaneal  spurring demonstrated. Musculature of the foot demonstrates diffuse  atrophy. Midfoot osseous structures demonstrate component of  intertarsal osteoarthritis. There is Lisfranc joint osteoarthritis  with 2nd and 3rd tarsometatarsal joint osteoarthritis demonstrated.  Visualized metatarsal bases are unremarkable.          IMPRESSION:  1. Severe ankle joint osteoarthritis with moderate effusion and  intra-articular debris/synovial hypertrophy. Reactive marrow edema  about the articulation. Moderate subtalar joint effusion demonstrated.  2. Dislocation of the peroneal tendons from the fibular groove  in  relation to torn superior peroneal retinaculum. There is tendinosis  and low-grade partial superficial tear. No tenosynovitis or focal  tendon tear.  3. Chronic medial and lateral collateral ligament sprain. Remote  cortical avulsion fragments in particular about the lateral malleolus.  4. Chronic high ankle sprain  5. Chronic plantar fasciitis        Signed by: Anali Escobedo 10/14/2024 10:38 AM  Dictation workstation:   [id number]  ------------------------------------------------  XR ANKLE BILATERAL 2 VIEWS; ;  10/9/2024 11:50 am      INDICATION:  Signs/Symptoms:left ankle pain, right ankle pain..      ,M25.572 Pain in left ankle and joints of left foot,G89.29 Other  chronic pain,S93.402A Sprain of unspecified ligament of left ankle,  initial encounter,M19.072 Primary osteoarthritis, left ankle and  foot,S93.409A Sprain of unspecified ligament of unspecified ankle,  initial encounter,M21.072 Valgus deformity, not elsewhere classified,  left ankle,M21.70 Unequal limb length (acquired), unspecified site      COMPARISON:  None.      ACCESSION NUMBER(S):  [id number]      ORDERING CLINICIAN:  ASHLEY BARKER      FINDINGS:  Bilateral ankles, two views of each      There is severe joint space narrowing with osteophytosis and  sclerosis involving the left ankle. There is hindfoot valgus  deformity. There is bimalleolar soft tissue edema. No acute fracture  dislocation seen.      No abnormality seen on the right in the ankle. There is moderate  severe joint space narrowing osteophytosis in the right midfoot  articulations of the naviculocuneiform and tarsometatarsal joints      IMPRESSION:  Severe ankle osteoarthritis on the left   hindfoot valgus  3. Moderate severe right midfoot osteoarthritis.   4. No abnormality seen in the right ankle      MACRO:  None      Signed by: Scot Tabares 10/10/2024 7:48 PM  -----------------------------------------------  Right leg shorter than left leg by the following:  Iliac  crest:  2.8 mm  Sacral base: 4.8 mm  Midline femoral heads:  level mm  Median difference of the right leg being shorter than left leg is approximately:  3.8 mm     XR PELVIS 1-2 VIEWS; ;  10/9/2024 11:50 am      INDICATION:  Signs/Symptoms:PELVIC PAIN.      ,M25.572 Pain in left ankle and joints of left foot,G89.29 Other  chronic pain,M21.70 Unequal limb length (acquired), unspecified site      COMPARISON:  None.      ACCESSION NUMBER(S):  [id number]      ORDERING CLINICIAN:  ASHLEY SWANSON      FINDINGS:  Pelvis, single view      There is no fracture or dislocation. Mild degenerative changes in the  hips with osteophytosis      IMPRESSION:  No acute abnormality. Mild osteophytosis in the hips          MACRO:  None      Signed by: Scot Tabares 10/10/2024 7:46 PM  Assessment   1. Chronic pain of left ankle  Point of Care Ultrasound      2. Moderate left ankle sprain, initial encounter  Point of Care Ultrasound      3. DJD (degenerative joint disease), ankle and foot, left  Point of Care Ultrasound      4. Grade 2 ankle sprain  Point of Care Ultrasound      5. Valgus foot deformity, acquired, left        6. Leg length difference, acquired        7. Acute right ankle pain  Point of Care Ultrasound      8. High ankle sprain of left lower extremity, subsequent encounter  Point of Care Ultrasound      9. Plantar fasciitis of left foot  Point of Care Ultrasound      10. Peroneal tendon injury, left, subsequent encounter  Point of Care Ultrasound          Procedure  Time Out (5 Minutes): Yes  Patient Name: Leanne Mims  YOB: 1950  Procedure: Prolozone injection #1  Needed Supplies Available: Correct Supplies  Laterality: left ankle  Site Verified and Marked: Correct Site(s) Marked  Timeout Performed by Provider: Dr. Ashley Swanson, D.O.  Staff Initials: [facility]    Patient is informed and consent has been signed by the patient if over 18 years old.   Patient parent and/or legal guardian is informed and  consent has been signed.   Patient and/or parent and/or legal guardian accept all risks, benefits, and possible complications associated with procedure(s) and/or manipulation(s) and/or injection(s).  Patient and/or parent and/or legal guardian deny patient allergy or known complications with any of the medications, instruments, products, and/or techniques used.  All questions and concerns were answered and/or addressed with the patient and/or parent and/or legal guardian.  The patient and/or parent and/or legal guardian agree to proceed with the procedure(s) and/or manipulation(s) and/or injection(s).  Prep: The area was cleansed with a mixture of equal parts rubbing alcohol 70% and Hibclens.  Utilizing clean technique, the injection site(s) were marked with a skin marker.   Injection site(s) were anesthestized with topical analgesic spray prior to injection.    Performed as a separate, cash-based service regenerative Prolozone injection #1 into the patients left ankle, under ultrasound.    Prolozone mixture of:  2mL Lidocaine 2% (NDC: 4173-0812-04 LOT#: [id number] EXP: 3/31/2025)  0.1mL Sodium Bicarbonate 8.4% (NDC: 3096-1702-62  LOT#: [id number]  EXP: 11/30/2025)  1mL Vitamin B12 (NDC: 72174-028-91; LOT#: [id number] EXP: 1/30/2026)   0.1mL Folic Acid (NDC: 18729-954-49; LOT#:  [id number] EXP: 2/28/2025)  0.1mL Vitamin B Complex (NDC: 96525-553-25; LOT#: [id number] EXP: 6/30/2025)  0.6mL 50% Dextrose (NDC: [address]-7; LOT#: [id number] EXP: 11/1/2024)  2ml Solumedrol  1.7mL Biocean Marine Plasma (NO NDC)  Followed by injection of Ozone 20mg/ml without removing the needle    Band-aid and/or compressive bandage was placed over the injection site(s). After the injection(s) performed osteopathic manipulation therapy to the affected area(s) to help increase blood flow to aid with the healing process as well as circulation of the medication. The patient tolerated the procedure well without any complications. The patient was instructed to  gently massage the treatment site(s) as well as to apply moist heat to the affected area(s). Additionally, the patient was instructed to contact the office immediately with any complications or concerns.    The  brittany was present in the room during the entire procedure.        The following discharge instructions were reviewed in detail with the patient to the level of their understanding:    PAIN:  A mild to moderate amount of discomfort, tenderness, stiffness, and achiness-caused by the inflammation of the area can be expected for a few days after the injection. This is normal and good as the inflammation is an important part of the healing process.    SKIN: Due to the numbing spray used, you may develop a red, itchy, scaly rash in the area that was sprayed. Should your skin become itchy, wash the area with mild soap and warm water. If needed, you may apply topical over-the-counter Hydrocortisone cream to alleviate any itchiness. AVOID scratching due to risk of infection.,     BATHING/SWIMMING: You may shower after your procedure with regular soap and water, but no baths, no swimming, and no hot tubs for 3-4 days after the procedure.,     ACTIVITIES:  Immediately following the injection, you may resume daily activities (such as work) and light exercise. We suggest that you refrain from strenuous activity and heavy lifting, particularly the injured body part, for a week post-procedure, but sometimes this can change as well.    MOIST HEAT/ICE:  Moist heat may be used on the injection area. NO ICE.  MEDICATION: You may continue your prescribed medications and any over-the-counter supplements; however, it is not recommended to use aspirin or anti-inflammatories (Motrin, Advil, Aleve, Ibuprofen, Naproxen etc.) for up to 2 weeks post procedure. Tylenol Extra Strength, Tylenol Arthritis and/or any prescribed narcotics or muscle relaxants are OK to take.    APPOINTMENTS: You should have a follow-up  appointment with Dr. Swanson scheduled 1-3 weeks as recommended after your procedure for your next round of injections or to follow-up after you have completed your injection series. Please schedule your follow-up appointment on your way out after your procedure, or call the office to schedule these appointments as soon as possible.    PRECAUTIONS: Smoking, caffeine, alcohol, sex and anti-inflammatories post-procedure may reduce the effectiveness of Prolotherapy/Neural Prolotherapy/Prolozone injections. Early, rare post procedure problems that can be concerning are as follows: an increase in pain not relieved by medication (over the counter or prescription), a temperature above 101 degrees, bleeding or progressive, extreme swelling, or numbness.     CALL THE OFFICE OR GO TO THE EMERGENCY ROOM IF ANY OF THESE SYMPTOMS OCCUR.   If you have any questions or problems, please call our office at [phone]         Treatment or Intervention:  May do only moist heat, since doing regenerative inflammatory injections  Stressed the importance of no ice since doing regenerative inflammatory injections  Continue physical Therapy 1-2 times a week for 8-10 weeks with manual therapy as well as dry needling and IASTM  Once again reviewed home exercises to be performed by the patient routinely  , ,   Once again Stressed the importance of wearing shoes with good stability control to help with the biomechanics affecting the lower legs  ,   Once again Stressed the importance of wearing full foot insoles to help with the biomechanics affecting the lower legs  ,   Once again Recommendation over-the-counter vitamin-D 2 -3000+ milligrams a day, as well as a daily multivitamin.  ,   Once again Recommendation over-the-counter curcumin, turmeric, boswellia, as well as egg shell membrane as directed to aid with joint inflammation.  ,   Once again Recommendation over-the-counter Move Free for joint health.  ,   May continue to take OTC Tylenol  Extra Strength or OTC Tylenol Arthritis, taking one every 6-8 hours with food as needed for pain management., Patient advised regarding the risks and/or potential adverse reactions and/or side effects of any prescribed medications along with any over-the-counter medications or any supplements used. Patient advised to seek immediate medical care if any adverse reactions occur. The patient and/or patient(s) parent(s) verbalized their understanding.  ,   Discussed in detail with the patient to the level of their understanding the possibility in the future of regenerative injections versus  viscosupplementation injections versus a combination,   Continue using 9mm heel lift in the right shoe. If it feels too much, peel off layer to make it 6mm. If that feels too much then peel off another layer to make the heel lift 3mm  Follow-up 10/28 for PRP #1 into the foot and ankle    Please note that this report has been produced using speech recognition software.  It may contain errors related to grammar, punctuation or spelling.  Electronically signed, but not reviewed.  KEITH Martinez, Director of Sports Medicine      ASHLEY BARKER on 10/24/24 at 7:43 AM.     PRAVIN Martinez DO     Performed Prolozone into the left foot and ankle

## 2024-10-23 ENCOUNTER — OFFICE VISIT (OUTPATIENT)
Dept: SPORTS MEDICINE | Facility: CLINIC | Age: 74
End: 2024-10-23
Payer: MEDICARE

## 2024-10-23 VITALS
HEART RATE: 70 BPM | HEIGHT: 68 IN | BODY MASS INDEX: 27.58 KG/M2 | DIASTOLIC BLOOD PRESSURE: 80 MMHG | SYSTOLIC BLOOD PRESSURE: 120 MMHG | WEIGHT: 182 LBS

## 2024-10-23 DIAGNOSIS — S93.409A GRADE 2 ANKLE SPRAIN: ICD-10-CM

## 2024-10-23 DIAGNOSIS — M25.571 ACUTE RIGHT ANKLE PAIN: ICD-10-CM

## 2024-10-23 DIAGNOSIS — M25.572 CHRONIC PAIN OF LEFT ANKLE: ICD-10-CM

## 2024-10-23 DIAGNOSIS — S93.402A MODERATE LEFT ANKLE SPRAIN, INITIAL ENCOUNTER: ICD-10-CM

## 2024-10-23 DIAGNOSIS — G89.29 CHRONIC PAIN OF LEFT ANKLE: ICD-10-CM

## 2024-10-23 DIAGNOSIS — S86.302D PERONEAL TENDON INJURY, LEFT, SUBSEQUENT ENCOUNTER: ICD-10-CM

## 2024-10-23 DIAGNOSIS — M21.072 VALGUS FOOT DEFORMITY, ACQUIRED, LEFT: ICD-10-CM

## 2024-10-23 DIAGNOSIS — M19.072 DJD (DEGENERATIVE JOINT DISEASE), ANKLE AND FOOT, LEFT: ICD-10-CM

## 2024-10-23 DIAGNOSIS — M72.2 PLANTAR FASCIITIS OF LEFT FOOT: ICD-10-CM

## 2024-10-23 DIAGNOSIS — M21.70 LEG LENGTH DIFFERENCE, ACQUIRED: ICD-10-CM

## 2024-10-23 DIAGNOSIS — S93.492D HIGH ANKLE SPRAIN OF LEFT LOWER EXTREMITY, SUBSEQUENT ENCOUNTER: ICD-10-CM

## 2024-10-23 PROCEDURE — 99214 OFFICE O/P EST MOD 30 MIN: CPT | Performed by: FAMILY MEDICINE

## 2024-10-23 ASSESSMENT — PATIENT HEALTH QUESTIONNAIRE - PHQ9
1. LITTLE INTEREST OR PLEASURE IN DOING THINGS: NOT AT ALL
2. FEELING DOWN, DEPRESSED OR HOPELESS: NOT AT ALL
SUM OF ALL RESPONSES TO PHQ9 QUESTIONS 1 AND 2: 0

## 2024-10-23 ASSESSMENT — ENCOUNTER SYMPTOMS
DEPRESSION: 0
OCCASIONAL FEELINGS OF UNSTEADINESS: 0
LOSS OF SENSATION IN FEET: 0

## 2024-10-23 ASSESSMENT — PAIN SCALES - GENERAL
PAINLEVEL_OUTOF10: 4
PAINLEVEL_OUTOF10: 4

## 2024-10-23 ASSESSMENT — PAIN DESCRIPTION - DESCRIPTORS: DESCRIPTORS: ACHING

## 2024-10-23 ASSESSMENT — PAIN - FUNCTIONAL ASSESSMENT: PAIN_FUNCTIONAL_ASSESSMENT: 0-10

## 2024-10-24 ENCOUNTER — HOSPITAL ENCOUNTER (OUTPATIENT)
Dept: RADIOLOGY | Facility: EXTERNAL LOCATION | Age: 74
Discharge: HOME | End: 2024-10-24

## 2024-10-28 ENCOUNTER — OFFICE VISIT (OUTPATIENT)
Dept: SPORTS MEDICINE | Facility: CLINIC | Age: 74
End: 2024-10-28
Payer: MEDICARE

## 2024-10-28 VITALS — HEART RATE: 78 BPM | HEIGHT: 68 IN | WEIGHT: 182 LBS | BODY MASS INDEX: 27.58 KG/M2

## 2024-10-28 DIAGNOSIS — M21.70 LEG LENGTH DIFFERENCE, ACQUIRED: ICD-10-CM

## 2024-10-28 DIAGNOSIS — M21.072 VALGUS FOOT DEFORMITY, ACQUIRED, LEFT: ICD-10-CM

## 2024-10-28 DIAGNOSIS — M72.2 PLANTAR FASCIITIS OF LEFT FOOT: ICD-10-CM

## 2024-10-28 DIAGNOSIS — M19.072 ARTHRITIS OF LEFT ANKLE: ICD-10-CM

## 2024-10-28 DIAGNOSIS — S93.402A MODERATE LEFT ANKLE SPRAIN, INITIAL ENCOUNTER: ICD-10-CM

## 2024-10-28 DIAGNOSIS — M25.572 CHRONIC PAIN OF LEFT ANKLE: ICD-10-CM

## 2024-10-28 DIAGNOSIS — S93.492D HIGH ANKLE SPRAIN OF LEFT LOWER EXTREMITY, SUBSEQUENT ENCOUNTER: ICD-10-CM

## 2024-10-28 DIAGNOSIS — M25.571 ACUTE RIGHT ANKLE PAIN: ICD-10-CM

## 2024-10-28 DIAGNOSIS — S86.302D PERONEAL TENDON INJURY, LEFT, SUBSEQUENT ENCOUNTER: ICD-10-CM

## 2024-10-28 DIAGNOSIS — G89.29 CHRONIC PAIN OF LEFT ANKLE: ICD-10-CM

## 2024-10-28 DIAGNOSIS — S93.409A GRADE 2 ANKLE SPRAIN: ICD-10-CM

## 2024-10-28 DIAGNOSIS — M19.072 DJD (DEGENERATIVE JOINT DISEASE), ANKLE AND FOOT, LEFT: ICD-10-CM

## 2024-10-28 PROCEDURE — 99214 OFFICE O/P EST MOD 30 MIN: CPT | Performed by: FAMILY MEDICINE

## 2024-10-28 PROCEDURE — 97035 APP MDLTY 1+ULTRASOUND EA 15: CPT | Performed by: FAMILY MEDICINE

## 2024-10-28 ASSESSMENT — COLUMBIA-SUICIDE SEVERITY RATING SCALE - C-SSRS
2. HAVE YOU ACTUALLY HAD ANY THOUGHTS OF KILLING YOURSELF?: NO
1. IN THE PAST MONTH, HAVE YOU WISHED YOU WERE DEAD OR WISHED YOU COULD GO TO SLEEP AND NOT WAKE UP?: NO
6. HAVE YOU EVER DONE ANYTHING, STARTED TO DO ANYTHING, OR PREPARED TO DO ANYTHING TO END YOUR LIFE?: NO

## 2024-10-28 ASSESSMENT — LIFESTYLE VARIABLES
HOW OFTEN DO YOU HAVE SIX OR MORE DRINKS ON ONE OCCASION: NEVER
HOW OFTEN DURING THE LAST YEAR HAVE YOU FAILED TO DO WHAT WAS NORMALLY EXPECTED FROM YOU BECAUSE OF DRINKING: NEVER
SKIP TO QUESTIONS 9-10: 1
AUDIT TOTAL SCORE: 3
HAVE YOU OR SOMEONE ELSE BEEN INJURED AS A RESULT OF YOUR DRINKING: NO
HOW OFTEN DURING THE LAST YEAR HAVE YOU NEEDED AN ALCOHOLIC DRINK FIRST THING IN THE MORNING TO GET YOURSELF GOING AFTER A NIGHT OF HEAVY DRINKING: NEVER
HOW MANY STANDARD DRINKS CONTAINING ALCOHOL DO YOU HAVE ON A TYPICAL DAY: 1 OR 2
HOW OFTEN DO YOU HAVE A DRINK CONTAINING ALCOHOL: 2-3 TIMES A WEEK
AUDIT-C TOTAL SCORE: 3
HOW OFTEN DURING THE LAST YEAR HAVE YOU HAD A FEELING OF GUILT OR REMORSE AFTER DRINKING: NEVER
HOW OFTEN DURING THE LAST YEAR HAVE YOU FOUND THAT YOU WERE NOT ABLE TO STOP DRINKING ONCE YOU HAD STARTED: NEVER
HAS A RELATIVE, FRIEND, DOCTOR, OR ANOTHER HEALTH PROFESSIONAL EXPRESSED CONCERN ABOUT YOUR DRINKING OR SUGGESTED YOU CUT DOWN: NO
HOW OFTEN DURING THE LAST YEAR HAVE YOU BEEN UNABLE TO REMEMBER WHAT HAPPENED THE NIGHT BEFORE BECAUSE YOU HAD BEEN DRINKING: NEVER

## 2024-10-28 ASSESSMENT — ENCOUNTER SYMPTOMS
LOSS OF SENSATION IN FEET: 0
DEPRESSION: 0
OCCASIONAL FEELINGS OF UNSTEADINESS: 0

## 2024-10-28 ASSESSMENT — PAIN SCALES - GENERAL: PAINLEVEL_OUTOF10: 0-NO PAIN

## 2024-10-28 ASSESSMENT — PAIN - FUNCTIONAL ASSESSMENT: PAIN_FUNCTIONAL_ASSESSMENT: NO/DENIES PAIN

## 2024-11-01 ENCOUNTER — APPOINTMENT (OUTPATIENT)
Dept: SPORTS MEDICINE | Facility: CLINIC | Age: 74
End: 2024-11-01
Payer: MEDICARE

## 2024-12-03 ENCOUNTER — APPOINTMENT (OUTPATIENT)
Dept: PHYSICAL THERAPY | Facility: CLINIC | Age: 74
End: 2024-12-03
Payer: MEDICARE

## 2024-12-03 NOTE — PROGRESS NOTES
Verbal consent of the patient and/or verbal parental consent for patients under the age of 18 have been obtained to conduct a physical examination at this office visit.    Established patient  History Of Present Illness  12/03/24 Leanne Mims is a 74 y.o. female who presents for Prolozone #2 inj of their Left ankle. States that she is feeling 2/10 pain today. States she has felt significant improvement since the PRP injection. She feels the pain is less consistent and less severe. She has recently switched to a more compression style of ankle brace and feels that helps her more. No new or worsening symptoms.       All previous Progress Notes and imaging results related to this patients chief complaint have been reviewed in preparation for this examination.    Past Medical History  She has a past medical history of Anxiety (09/05/2023), Cataract (2023), Dizziness (12/29/2023), Elevated cholesterol (09/05/2023), Essential hypertension (09/05/2023), IFG (impaired fasting glucose) (09/05/2023), Meningioma (Multi) (09/05/2023), Neuropathy (09/05/2023), Personal history of malignant neoplasm of breast (09/05/2023), Primary osteoarthritis involving multiple joints (12/29/2023), and Vertigo (12/29/2023).    Surgical History  She has a past surgical history that includes Breast lumpectomy (2017) and Joint replacement (Left, 05/21/2024).     Social History  She reports that she has never smoked. She has never been exposed to tobacco smoke. She has never used smokeless tobacco. She reports current alcohol use of about 3.0 standard drinks of alcohol per week. She reports that she does not use drugs.    Family History  Family History   Problem Relation Name Age of Onset    Heart disease Mother Eunice Deprince     Hypertension Mother Eunice Denis     Cancer Father Layo Deprince    History Of Present Illness  10/09/24 Leanne Mims is a 74 y.o. female who presents for an evaluation of their Left ankle. Arrives with her significant  other Elliot. States that 5 years ago she sprained her ankle on vacation and was able to tolerate walking on it with an ankle brace. Has been a minor discomfort since then. In May she had a TKR on the left leg and has been and since then the left ankle pain has resurfaced and worsened. She states a 6/10 grinding type pain. She wears a supportive ankle brace regularly. She was seen by [facility] and had XR completed on 9/27 where they found arthritic changes. States that she will take advil prn for pain management. States that she goes up and down stairs one leg at a time. States that her pain is relatively the same regardless of activity.  ----------------------------------------  10/22/24 Leanne Mims is a 74 y.o. female who presents for MRI review of their Left ankle. States that she is not able to get into PT until December. States that she is feeling the same as when she was last seen. She arrives wearing her ankle brace. She states no new or worsening symptoms.   She does not want surgical intervention especially and ankle fusion.  She says it does not give her enough pain that she feels require surgery at this time however she would be interested in the regenerative injections as well as the joint lubrication injections if they would help.   ----------------------------------------  10/24/24 Leanne Mims is a 74 y.o. female who presents for Prolozone #1 inj of their Left ankle. She is unchanged from last seen yesterday she says she is excited to try these injections because of how good she heard they did for some of her friends especially her significant other Al unchanged since previous visit unchanged since previous visitvin.  She still would like to try to avoid surgical intervention is much as possible because she does not want an ankle fusion whatsoever with some of the surgeons have told her.    She says it is uncomfortable but she is able to deal with it.  Unchanged since previous  visit  ----------------------------------------  10/28/24 Leanne Mims is a 74 y.o. female who presents for PRP #1 inj of their Left ankle. She is unchanged from her last visit.  She says she was a little sore after the Prolozone injection but afterwards it felt pretty good.  She says once again she is excited to continue on with these injections because of how good she heard they did for some of her friends especially her significant other Al.  Plus additionally she would like to try to avoid surgical intervention is much as possible.  She says it is uncomfortable but she is able to deal with it.        Allergies  Penicillins    Review of Systems  CONSTITUTIONAL:   Negative for weight change, loss of appetite, fatigue, weakness, fever, chills, night sweats, headaches .           HEENT:   Negative for cold, cough, sore throat, sinus pain, swollen lymph nodes.           OPHTHALMOLOGY:   Negative for diminished vision, blurred vision, loss of vision, double vision.           ALLERGY:   Negative for runny nose, scratchy throat, sinus congestion, rash, facial pressure, nasal congestion, post-nasal drip.           CARDIOLOGY:   Negative for chest pain, palpitations, murmurs, irregular heart beat, shortness of breath, leg edema, dyspnea on exertion, fatigue, dizziness.           RESPIRATORY:   Negative for chest pain, shortness of breath, swelling of the legs, asthma/copd, chest congestion, pain with breathing .           GASTROENTEROLOGY:   Negative for nausea, vomitting, heartburn, constipation, diarrhea, blood in stool, change in bowel habits, black stool.           HEMATOLOGY/LYMPH:   Negative for fatigue, loss of appetitie, easy bruising, easy bleeding, anemia, abnormal bleeding, slow healing.           ENDOCRINOLOGY:   Negative for polyuria, polydipsia, polyphagia, fatigue, weight loss, weight gain, cold intolerance, heat intolerance, diabetes.           MUSCULOSKELETAL:   Positive  for Left ankle         DERMATOLOGY:   Negative for rash, bruising.           NEUROLOGY:   Negative for tingling, numbness, gait abnormality, paresthesias, weakness, sciatica.        Examination: All findings improved since PRP injection  Left Lateral Lateral Malleolus and Fibula  Edema: Negative   ecchymosis/Bruising: Negative  Percussion: Negative   tuning Fork Test: Negative    Orientation:All findings improved since PRP injection  Symmetrical           ROM:All findings improved since PRP injection  Positive, Decreased.  Plantarflexion and dorsiflexion as well as inversion and eversion as well as them in combination because of significant arthritic changes           Muscle Strength:All findings improved since PRP injection  Positive +3-+4/+5 Planar Flexion,   Positive  +3-+4/+5  Dorsi Flexion,         Positive  +3-+4/+5  Inversion, still   Positive  +3-+4/+5  Eversion, still       Positive  +3-+4/+5  Plantarflexion in combination with inversion,still    +5/+5 Plantarflexion in combination with eversion          Positive  +3-+4/+5  Dorsiflexion in combination with inversion (Posterior Tibialis),still    +5/+5 Dorsiflexion in combination with eversion (Peroneal Brevis)  +5/+5 Dorsiflexion in combination with eversion and flexion of great toe (Peroneal Longus).       Palpation:All findings improved since PRP injection  Positive,  to Palpation lateral mallelous and ATF > CF > PTF.  And additionally anterior inferior tib-fib ligament           Vascular:   +2/+4 Dorsalis Pedis  +2/+4 Posterior Tibialis  Capillary Refill < 2 Seconds.               Leg/Ankle/Foot - Ankle Impingement:  Posterior Ankle Impingement Test: Negative.   Anterior Ankle Impingement Test: Negative.         Leg/Ankle/Foot - Ankle InstabilityAll findings improved since PRP injection  Flexibility Test:  Negative.   Anterior Drawer Test:  Positive   Talar Tilt Test:  Positive  External Rotation Kleiger Plantar Flexion Test:  Negative.   External Rotation  Kleiger Dorsiflexion Test:  Negative.   Squeeze Test:  Positive  Dorsiflexion Test:  Negative.   Posterior Tibial Instability Test: Negative.  Peroneal Tendon Instability Test:  Positive  Horizontal Squeeze Test:  Negative.   Vertical Squeeze Test:  Negative.   Standing/Walking Test: Positive, Painful.   Hop Test:  Positive          Leg/Ankle/Foot - DVT:  Justina's Sign: Negative.            Leg/Ankle/Foot - Hindfoot Achilles/Calcaneus:  Kimble Compression Test: Negative.   Hoffa Sign: Negative.   Achilles Tendon Tap Test: Negative.   Heel Compression Test: Negative.            Feet/Foot:   Positive  Left Valgus foot        Imaging and Diagnostics Review:  nterpreted By:  Anali Escobedo,   STUDY:  MR ANKLE LEFT WO IV CONTRAST; ;  10/14/2024 8:36 am      INDICATION:  Signs/Symptoms:LEFT ANKLE PAIN.      ,M25.572 Pain in left ankle and joints of left foot,G89.29 Other  chronic pain,S93.402A Sprain of unspecified ligament of left ankle,  initial encounter,M19.072 Primary osteoarthritis, left ankle and  foot,S93.409A Sprain of unspecified ligament of unspecified ankle,  initial encounter,M21.072 Valgus deformity, not elsewhere classified,  left ankle,M21.70 Unequal limb length (acquired), unspecified site      COMPARISON:  X-ray 10/09/2024      ACCESSION NUMBER(S):  [id number]      ORDERING CLINICIAN:  ASHLEY BARKER      TECHNIQUE:  Multiplanar multisequence MRI obtained of the left ankle.      FINDINGS:  Ankle joint demonstrates a moderate effusion with intermediate signal  intensity and debris/synovial hypertrophy about the articulation.  There is severe loss of articular cartilage with mild remodeling of  the subchondral bone plate in particular lateral talar dome as well  as corresponding lateral tibial plafond. Mild reactive marrow edema  demonstrated in this area. Moderate fluid in the common posterior  recess of the ankle joint with intra-articular bodies demonstrated.  Of note, there is fluid tracking along  the flexor hallucis longus  from the ankle joint. There is a moderate subtalar joint effusion  with edema and fluid tracking along the inferior extensor retinaculum  in the sinus tarsi. Reactive marrow edema demonstrated in the lateral  process of the talus.      Anterior inferior tibiofibular ligament demonstrates sequela of  chronic sprain. Posterior inferior tibiofibular ligament is intact.  Distal interosseous tibiofibular ligament is intact.      Anterior talofibular ligament demonstrates sequela of chronic tear  with remote well corticated ossific fragments from the lateral  malleolus about the tip with largest fragment identified measuring 7  mm. Calcaneofibular ligament demonstrates sequela of chronic sprain.  The ligament is seen about the previously noted areas of ossific  fragment. However, no focal discontinuity in the current examination.  Posterior talofibular ligament is intact.      Posterior tibiotalar portion deep deltoid ligament demonstrates  sequela of chronic sprain with osseous irregularity about the  colliculus. No focal discontinuity demonstrated. Visualized  components superficial deltoid ligament demonstrates sequela of  chronic sprain with chronic thickened tibia navicular portion with  spring ligament complex demonstrates chronic thickened superomedial  portion.      Sinus tarsi demonstrates generalized edema.      Posterior tibial tendon is intact. Flexor digitorum and flexor  hallucis longus tendons are intact. Fluid seen tracking along the  flexor hallucis longus tendon sheath. Tarsal tunnel is intact.      Peroneus brevis and longus are dislocated from the fibular groove  demonstrated along the outer margin of the lateral malleolus in  relation to remote tear of the superior peroneal retinaculum. The  peroneus longus tendon demonstrates mild tendinosis with moderate  peroneus brevis tendinosis. Low-grade partial superficial tear  demonstrated. Tibialis anterior and extensor tendons  intact      Achilles tendon intact. Chronic plantar fasciitis demonstrated with  chronic thickened central and lateral cords with moderate calcaneal  spurring demonstrated. Musculature of the foot demonstrates diffuse  atrophy. Midfoot osseous structures demonstrate component of  intertarsal osteoarthritis. There is Lisfranc joint osteoarthritis  with 2nd and 3rd tarsometatarsal joint osteoarthritis demonstrated.  Visualized metatarsal bases are unremarkable.          IMPRESSION:  1. Severe ankle joint osteoarthritis with moderate effusion and  intra-articular debris/synovial hypertrophy. Reactive marrow edema  about the articulation. Moderate subtalar joint effusion demonstrated.  2. Dislocation of the peroneal tendons from the fibular groove in  relation to torn superior peroneal retinaculum. There is tendinosis  and low-grade partial superficial tear. No tenosynovitis or focal  tendon tear.  3. Chronic medial and lateral collateral ligament sprain. Remote  cortical avulsion fragments in particular about the lateral malleolus.  4. Chronic high ankle sprain  5. Chronic plantar fasciitis        Signed by: Anali Escobedo 10/14/2024 10:38 AM  Dictation workstation:   [id number]  ------------------------------------------------  XR ANKLE BILATERAL 2 VIEWS; ;  10/9/2024 11:50 am      INDICATION:  Signs/Symptoms:left ankle pain, right ankle pain..      ,M25.572 Pain in left ankle and joints of left foot,G89.29 Other  chronic pain,S93.402A Sprain of unspecified ligament of left ankle,  initial encounter,M19.072 Primary osteoarthritis, left ankle and  foot,S93.409A Sprain of unspecified ligament of unspecified ankle,  initial encounter,M21.072 Valgus deformity, not elsewhere classified,  left ankle,M21.70 Unequal limb length (acquired), unspecified site      COMPARISON:  None.      ACCESSION NUMBER(S):  [id number]      ORDERING CLINICIAN:  ASHLEY BARKER      FINDINGS:  Bilateral ankles, two views of each      There is  severe joint space narrowing with osteophytosis and  sclerosis involving the left ankle. There is hindfoot valgus  deformity. There is bimalleolar soft tissue edema. No acute fracture  dislocation seen.      No abnormality seen on the right in the ankle. There is moderate  severe joint space narrowing osteophytosis in the right midfoot  articulations of the naviculocuneiform and tarsometatarsal joints      IMPRESSION:  Severe ankle osteoarthritis on the left   hindfoot valgus  3. Moderate severe right midfoot osteoarthritis.   4. No abnormality seen in the right ankle      MACRO:  None      Signed by: Scot Tabares 10/10/2024 7:48 PM  -----------------------------------------------  Right leg shorter than left leg by the following:  Iliac crest:  2.8 mm  Sacral base: 4.8 mm  Midline femoral heads:  level mm  Median difference of the right leg being shorter than left leg is approximately:  3.8 mm     XR PELVIS 1-2 VIEWS; ;  10/9/2024 11:50 am      INDICATION:  Signs/Symptoms:PELVIC PAIN.      ,M25.572 Pain in left ankle and joints of left foot,G89.29 Other  chronic pain,M21.70 Unequal limb length (acquired), unspecified site      COMPARISON:  None.      ACCESSION NUMBER(S):  [id number]      ORDERING CLINICIAN:  ASHLEY SWANSON      FINDINGS:  Pelvis, single view      There is no fracture or dislocation. Mild degenerative changes in the  hips with osteophytosis      IMPRESSION:  No acute abnormality. Mild osteophytosis in the hips          MACRO:  None      Signed by: Scot Tabares 10/10/2024 7:46 PM  Assessment   No diagnosis found.      Procedure  Time Out (5 Minutes): Yes  Patient Name: Leanne Mims  YOB: 1950  Procedure: Prolozone injection #2  Needed Supplies Available: Correct Supplies  Laterality: left ankle  Site Verified and Marked: Correct Site(s) Marked  Timeout Performed by Provider: Dr. Ashley Swanson, SORAYA.O.  Staff Initials: [facility]    Patient is informed and consent has been signed by  the patient if over 18 years old.   Patient parent and/or legal guardian is informed and consent has been signed.   Patient and/or parent and/or legal guardian accept all risks, benefits, and possible complications associated with procedure(s) and/or manipulation(s) and/or injection(s).  Patient and/or parent and/or legal guardian deny patient allergy or known complications with any of the medications, instruments, products, and/or techniques used.  All questions and concerns were answered and/or addressed with the patient and/or parent and/or legal guardian.  The patient and/or parent and/or legal guardian agree to proceed with the procedure(s) and/or manipulation(s) and/or injection(s).  Prep: The area was cleansed with a mixture of equal parts rubbing alcohol 70% and Hibclens.  Utilizing clean technique, the injection site(s) were marked with a skin marker.   Injection site(s) were anesthestized with topical analgesic spray prior to injection.    Performed as a separate, cash-based service regenerative Prolozone injection #2 into the patients left ankle, under ultrasound.    Prolozone mixture of:  2mL Lidocaine 2% (NDC: 2121-1286-21 LOT#: [id number] EXP: 3/31/2025)  0.1mL Sodium Bicarbonate 8.4% (NDC: 2767-5318-36  LOT#: [id number]  EXP: 11/30/2025)  1mL Vitamin B12 (NDC: 71089-691-88; LOT#: [id number] EXP: 1/30/2026)   0.1mL Folic Acid (NDC: 50033-608-03; LOT#:  [id number] EXP: 2/28/2025)  0.1mL Vitamin B Complex (NDC: 86161-091-39; LOT#: [id number] EXP: 6/30/2025)  0.6mL 50% Dextrose (NDC: [address]-8; LOT#: [id number] EXP: 11/1/2024)  2ml Solumedrol  1.7mL Biocean Marine Plasma (NO NDC)  Followed by injection of Ozone 20mg/ml without removing the needle    Band-aid and/or compressive bandage was placed over the injection site(s). After the injection(s) performed osteopathic manipulation therapy to the affected area(s) to help increase blood flow to aid with the healing process as well as circulation of the medication. The  patient tolerated the procedure well without any complications. The patient was instructed to gently massage the treatment site(s) as well as to apply moist heat to the affected area(s). Additionally, the patient was instructed to contact the office immediately with any complications or concerns.    The  brittany was present in the room during the entire procedure.        The following discharge instructions were reviewed in detail with the patient to the level of their understanding:    PAIN:  A mild to moderate amount of discomfort, tenderness, stiffness, and achiness-caused by the inflammation of the area can be expected for a few days after the injection. This is normal and good as the inflammation is an important part of the healing process.    SKIN: Due to the numbing spray used, you may develop a red, itchy, scaly rash in the area that was sprayed. Should your skin become itchy, wash the area with mild soap and warm water. If needed, you may apply topical over-the-counter Hydrocortisone cream to alleviate any itchiness. AVOID scratching due to risk of infection.,     BATHING/SWIMMING: You may shower after your procedure with regular soap and water, but no baths, no swimming, and no hot tubs for 3-4 days after the procedure.,     ACTIVITIES:  Immediately following the injection, you may resume daily activities (such as work) and light exercise. We suggest that you refrain from strenuous activity and heavy lifting, particularly the injured body part, for a week post-procedure, but sometimes this can change as well.    MOIST HEAT/ICE:  Moist heat may be used on the injection area. NO ICE.  MEDICATION: You may continue your prescribed medications and any over-the-counter supplements; however, it is not recommended to use aspirin or anti-inflammatories (Motrin, Advil, Aleve, Ibuprofen, Naproxen etc.) for up to 2 weeks post procedure. Tylenol Extra Strength, Tylenol Arthritis and/or any prescribed  narcotics or muscle relaxants are OK to take.    APPOINTMENTS: You should have a follow-up appointment with Dr. Swanson scheduled 1-3 weeks as recommended after your procedure for your next round of injections or to follow-up after you have completed your injection series. Please schedule your follow-up appointment on your way out after your procedure, or call the office to schedule these appointments as soon as possible.    PRECAUTIONS: Smoking, caffeine, alcohol, sex and anti-inflammatories post-procedure may reduce the effectiveness of Prolotherapy/Neural Prolotherapy/Prolozone injections. Early, rare post procedure problems that can be concerning are as follows: an increase in pain not relieved by medication (over the counter or prescription), a temperature above 101 degrees, bleeding or progressive, extreme swelling, or numbness.     CALL THE OFFICE OR GO TO THE EMERGENCY ROOM IF ANY OF THESE SYMPTOMS OCCUR.   If you have any questions or problems, please call our office at [phone]         Treatment or Intervention:  May do only moist heat, since doing regenerative inflammatory injections  Stressed the importance of no ice since doing regenerative inflammatory injections  Continue physical Therapy until done  Once again reviewed home exercises to be performed by the patient routinely  , ,   Once again Stressed the importance of wearing shoes with good stability control to help with the biomechanics affecting the lower legs  ,   Once again Stressed the importance of wearing full foot insoles to help with the biomechanics affecting the lower legs  ,   Once again Recommendation over-the-counter vitamin-D 2 -3000+ milligrams a day, as well as a daily multivitamin.  ,   Once again Recommendation over-the-counter curcumin, turmeric, boswellia, as well as egg shell membrane as directed to aid with joint inflammation.  ,   Once again Recommendation over-the-counter Move Free for joint health.  ,   May continue to  take OTC Tylenol Extra Strength or OTC Tylenol Arthritis, taking one every 6-8 hours with food as needed for pain management., Patient advised regarding the risks and/or potential adverse reactions and/or side effects of any prescribed medications along with any over-the-counter medications or any supplements used. Patient advised to seek immediate medical care if any adverse reactions occur. The patient and/or patient(s) parent(s) verbalized their understanding.  ,   Discussed in detail with the patient to the level of their understanding the possibility in the future of regenerative injections versus  viscosupplementation injections versus a combination,   Continue using 9mm heel lift in the right shoe. If it feels too much, peel off layer to make it 6mm. If that feels too much then peel off another layer to make the heel lift 3mm  Performed Prolozone injection #2 into the patients left ankle under ultrasound. The patient tolerated the procedure well and without any adverse effects. Discharge instructions for REGENERATIVE PROLOZONE injections were reviewed in detail with the patient to the level of their understanding; a copy of these instructions were provided to the patient at the time of this office visit.     Please note that this report has been produced using speech recognition software.  It may contain errors related to grammar, punctuation or spelling.  Electronically signed, but not reviewed.  KEITH Martinez, Director of Sports Medicine      ANJELICA BUSTILLOS on 12/3/24 at 11:31 AM.     PRAVIN Martinez DO     Performed Prolozone into the left foot and ankle

## 2024-12-04 ENCOUNTER — OFFICE VISIT (OUTPATIENT)
Dept: SPORTS MEDICINE | Facility: CLINIC | Age: 74
End: 2024-12-04
Payer: MEDICARE

## 2024-12-04 VITALS
BODY MASS INDEX: 27.58 KG/M2 | SYSTOLIC BLOOD PRESSURE: 120 MMHG | HEART RATE: 70 BPM | WEIGHT: 182 LBS | DIASTOLIC BLOOD PRESSURE: 80 MMHG | HEIGHT: 68 IN

## 2024-12-04 DIAGNOSIS — S86.302D PERONEAL TENDON INJURY, LEFT, SUBSEQUENT ENCOUNTER: ICD-10-CM

## 2024-12-04 DIAGNOSIS — S93.492D HIGH ANKLE SPRAIN OF LEFT LOWER EXTREMITY, SUBSEQUENT ENCOUNTER: ICD-10-CM

## 2024-12-04 DIAGNOSIS — M25.572 CHRONIC PAIN OF LEFT ANKLE: ICD-10-CM

## 2024-12-04 DIAGNOSIS — M72.2 PLANTAR FASCIITIS OF LEFT FOOT: ICD-10-CM

## 2024-12-04 DIAGNOSIS — S93.402A MODERATE LEFT ANKLE SPRAIN, INITIAL ENCOUNTER: ICD-10-CM

## 2024-12-04 DIAGNOSIS — M19.072 ARTHRITIS OF LEFT ANKLE: ICD-10-CM

## 2024-12-04 DIAGNOSIS — M21.70 LEG LENGTH DIFFERENCE, ACQUIRED: ICD-10-CM

## 2024-12-04 DIAGNOSIS — M25.571 ACUTE RIGHT ANKLE PAIN: ICD-10-CM

## 2024-12-04 DIAGNOSIS — S93.409A GRADE 2 ANKLE SPRAIN: ICD-10-CM

## 2024-12-04 DIAGNOSIS — M19.072 DJD (DEGENERATIVE JOINT DISEASE), ANKLE AND FOOT, LEFT: ICD-10-CM

## 2024-12-04 DIAGNOSIS — G89.29 CHRONIC PAIN OF LEFT ANKLE: ICD-10-CM

## 2024-12-04 DIAGNOSIS — M21.072 VALGUS FOOT DEFORMITY, ACQUIRED, LEFT: ICD-10-CM

## 2024-12-04 PROCEDURE — ZHPUS: Performed by: FAMILY MEDICINE

## 2024-12-04 PROCEDURE — 99214 OFFICE O/P EST MOD 30 MIN: CPT | Performed by: FAMILY MEDICINE

## 2024-12-04 ASSESSMENT — COLUMBIA-SUICIDE SEVERITY RATING SCALE - C-SSRS
1. IN THE PAST MONTH, HAVE YOU WISHED YOU WERE DEAD OR WISHED YOU COULD GO TO SLEEP AND NOT WAKE UP?: NO
6. HAVE YOU EVER DONE ANYTHING, STARTED TO DO ANYTHING, OR PREPARED TO DO ANYTHING TO END YOUR LIFE?: NO
2. HAVE YOU ACTUALLY HAD ANY THOUGHTS OF KILLING YOURSELF?: NO

## 2024-12-04 ASSESSMENT — ENCOUNTER SYMPTOMS
DEPRESSION: 0
LOSS OF SENSATION IN FEET: 0
OCCASIONAL FEELINGS OF UNSTEADINESS: 0

## 2024-12-04 ASSESSMENT — PAIN SCALES - GENERAL
PAINLEVEL_OUTOF10: 2
PAINLEVEL_OUTOF10: 2

## 2024-12-04 ASSESSMENT — PAIN - FUNCTIONAL ASSESSMENT: PAIN_FUNCTIONAL_ASSESSMENT: 0-10

## 2024-12-04 ASSESSMENT — PAIN DESCRIPTION - DESCRIPTORS: DESCRIPTORS: ACHING;SORE

## 2024-12-30 NOTE — PROGRESS NOTES
Verbal consent of the patient and/or verbal parental consent for patients under the age of 18 have been obtained to conduct a physical examination at this office visit.    Established patient  History Of Present Illness  01/03/25 Leanne Mims is a 74 y.o. female who presents for PRP #2 inj of their Left ankle. Patient arrives today with 1/10 pain in the left ankle. States that her pain will increase to a 4/10 with prolonged walking and standing and usually worse at the end of the day. States that she continues home exercises and heat for pain management. She arrives with good shoes and supportive insoles with her heel lift. Patient states that she feels her regenerative injections have helped tremendously with her left ankle condition.   She says overall she has been very happy with the results of the injections not only with her range of motion but also her strength.  She says it does not feel like it catches and gives out on her like it did previously.      All previous Progress Notes and imaging results related to this patients chief complaint have been reviewed in preparation for this examination.    Past Medical History  She has a past medical history of Anxiety (09/05/2023), Cataract (2023), Dizziness (12/29/2023), Elevated cholesterol (09/05/2023), Essential hypertension (09/05/2023), IFG (impaired fasting glucose) (09/05/2023), Meningioma (Multi) (09/05/2023), Neuropathy (09/05/2023), Personal history of malignant neoplasm of breast (09/05/2023), Primary osteoarthritis involving multiple joints (12/29/2023), and Vertigo (12/29/2023).    Surgical History  She has a past surgical history that includes Breast lumpectomy (2017) and Joint replacement (Left, 05/21/2024).     Social History  She reports that she has never smoked. She has never been exposed to tobacco smoke. She has never used smokeless tobacco. She reports current alcohol use of about 3.0 standard drinks of alcohol per week. She reports that she  does not use drugs.    Family History  Family History   Problem Relation Name Age of Onset    Heart disease Mother Eunice Denis     Hypertension Mother Eunice Denis     Cancer Father Layo Denis    History Of Present Illness  10/09/24 Leanne Mims is a 74 y.o. female who presents for an evaluation of their Left ankle. Arrives with her significant other Elliot. States that 5 years ago she sprained her ankle on vacation and was able to tolerate walking on it with an ankle brace. Has been a minor discomfort since then. In May she had a TKR on the left leg and has been and since then the left ankle pain has resurfaced and worsened. She states a 6/10 grinding type pain. She wears a supportive ankle brace regularly. She was seen by [facility] and had XR completed on 9/27 where they found arthritic changes. States that she will take advil prn for pain management. States that she goes up and down stairs one leg at a time. States that her pain is relatively the same regardless of activity.  ----------------------------------------  10/22/24 Leanne Mims is a 74 y.o. female who presents for MRI review of their Left ankle. States that she is not able to get into PT until December. States that she is feeling the same as when she was last seen. She arrives wearing her ankle brace. She states no new or worsening symptoms.   She does not want surgical intervention especially and ankle fusion.  She says it does not give her enough pain that she feels require surgery at this time however she would be interested in the regenerative injections as well as the joint lubrication injections if they would help.   ----------------------------------------  10/24/24 Leanne Mims is a 74 y.o. female who presents for Prolozone #1 inj of their Left ankle. She is unchanged from last seen yesterday she says she is excited to try these injections because of how good she heard they did for some of her friends especially her significant  other Al unchanged since previous visit unchanged since previous visitvin.  She still would like to try to avoid surgical intervention is much as possible because she does not want an ankle fusion whatsoever with some of the surgeons have told her.    She says it is uncomfortable but she is able to deal with it.  Unchanged since previous visit  ----------------------------------------  10/28/24 Leanne Mims is a 74 y.o. female who presents for PRP #1 inj of their Left ankle. She is unchanged from her last visit.  She says she was a little sore after the Prolozone injection but afterwards it felt pretty good.  She says once again she is excited to continue on with these injections because of how good she heard they did for some of her friends especially her significant other Al.  Plus additionally she would like to try to avoid surgical intervention is much as possible.  She says it is uncomfortable but she is able to deal with it.   ---------------------------------------  12/03/24 Leanne Mims is a 74 y.o. female who presents for Prolozone #2 inj of their Left ankle. States that she is feeling 2/10 pain today. States she has felt significant improvement since the PRP injection. She feels the pain is less consistent and less severe. She has recently switched to a more compression style of ankle brace and feels that helps her more. No new or worsening symptoms.     Allergies  Penicillins    Review of Systems  CONSTITUTIONAL:   Negative for weight change, loss of appetite, fatigue, weakness, fever, chills, night sweats, headaches .           HEENT:   Negative for cold, cough, sore throat, sinus pain, swollen lymph nodes.           OPHTHALMOLOGY:   Negative for diminished vision, blurred vision, loss of vision, double vision.           ALLERGY:   Negative for runny nose, scratchy throat, sinus congestion, rash, facial pressure, nasal congestion, post-nasal drip.           CARDIOLOGY:   Negative for chest pain,  palpitations, murmurs, irregular heart beat, shortness of breath, leg edema, dyspnea on exertion, fatigue, dizziness.           RESPIRATORY:   Negative for chest pain, shortness of breath, swelling of the legs, asthma/copd, chest congestion, pain with breathing .           GASTROENTEROLOGY:   Negative for nausea, vomitting, heartburn, constipation, diarrhea, blood in stool, change in bowel habits, black stool.           HEMATOLOGY/LYMPH:   Negative for fatigue, loss of appetitie, easy bruising, easy bleeding, anemia, abnormal bleeding, slow healing.           ENDOCRINOLOGY:   Negative for polyuria, polydipsia, polyphagia, fatigue, weight loss, weight gain, cold intolerance, heat intolerance, diabetes.           MUSCULOSKELETAL:   Positive  for Left ankle        DERMATOLOGY:   Negative for rash, bruising.           NEUROLOGY:   Negative for tingling, numbness, gait abnormality, paresthesias, weakness, sciatica.        Examination: All findings improved tremendously since starting regenerative injections and home exercise program  Left Lateral Lateral Malleolus and Fibula  Edema: Negative   ecchymosis/Bruising: Negative  Percussion: Negative   tuning Fork Test: Negative    Orientation:All findings improved tremendously since starting regenerative injections and home exercise program  Symmetrical           ROM:All findings improved tremendously since starting regenerative injections and home exercise program  Positive, Decreased.  Plantarflexion and dorsiflexion as well as inversion and eversion as well as them in combination because of significant arthritic changes           Muscle Strength:All findings improved tremendously since starting regenerative injections and home exercise program  Positive +3-+4/+5 Planar Flexion,   Positive  +3-+4/+5  Dorsi Flexion,         Positive  +3-+4/+5  Inversion, still   Positive  +3-+4/+5  Eversion, still       Positive  +3-+4/+5  Plantarflexion in combination with inversion,still     +5/+5 Plantarflexion in combination with eversion          Positive  +3-+4/+5  Dorsiflexion in combination with inversion (Posterior Tibialis),still    +5/+5 Dorsiflexion in combination with eversion (Peroneal Brevis)  +5/+5 Dorsiflexion in combination with eversion and flexion of great toe (Peroneal Longus).       Palpation:All findings improved tremendously since starting regenerative injections and home exercise program  Positive,  to Palpation lateral mallelous and ATF > CF > PTF.  And additionally anterior inferior tib-fib ligament           Vascular:   +2/+4 Dorsalis Pedis  +2/+4 Posterior Tibialis  Capillary Refill < 2 Seconds.               Leg/Ankle/Foot - Ankle Impingement:  Posterior Ankle Impingement Test: Negative.   Anterior Ankle Impingement Test: Negative.         Leg/Ankle/Foot - Ankle Instability:  All findings improved tremendously since starting regenerative injections and home exercise program  Flexibility Test:  Negative.   Anterior Drawer Test:  Positive   Talar Tilt Test:  Positive  External Rotation Kleiger Plantar Flexion Test:  Negative.   External Rotation Kleiger Dorsiflexion Test:  Negative.   Squeeze Test:  Positive  Dorsiflexion Test:  Negative.   Posterior Tibial Instability Test: Negative.  Peroneal Tendon Instability Test:  Positive  Horizontal Squeeze Test:  Negative.   Vertical Squeeze Test:  Negative.   Standing/Walking Test: Positive, Painful.   Hop Test:  Positive          Leg/Ankle/Foot - DVT:  Ujstina's Sign: Negative.            Leg/Ankle/Foot - Hindfoot Achilles/Calcaneus:  Kimble Compression Test: Negative.   Hoffa Sign: Negative.   Achilles Tendon Tap Test: Negative.   Heel Compression Test: Negative.            Feet/Foot:   Positive  Left Valgus foot        Imaging and Diagnostics Review:  nterpreted By:  Anali Escobedo,   STUDY:  MR ANKLE LEFT WO IV CONTRAST; ;  10/14/2024 8:36 am      INDICATION:  Signs/Symptoms:LEFT ANKLE PAIN.      ,M25.572 Pain in  left ankle and joints of left foot,G89.29 Other  chronic pain,S93.402A Sprain of unspecified ligament of left ankle,  initial encounter,M19.072 Primary osteoarthritis, left ankle and  foot,S93.409A Sprain of unspecified ligament of unspecified ankle,  initial encounter,M21.072 Valgus deformity, not elsewhere classified,  left ankle,M21.70 Unequal limb length (acquired), unspecified site      COMPARISON:  X-ray 10/09/2024      ACCESSION NUMBER(S):  [id number]      ORDERING CLINICIAN:  ASHLEY BARKER      TECHNIQUE:  Multiplanar multisequence MRI obtained of the left ankle.      FINDINGS:  Ankle joint demonstrates a moderate effusion with intermediate signal  intensity and debris/synovial hypertrophy about the articulation.  There is severe loss of articular cartilage with mild remodeling of  the subchondral bone plate in particular lateral talar dome as well  as corresponding lateral tibial plafond. Mild reactive marrow edema  demonstrated in this area. Moderate fluid in the common posterior  recess of the ankle joint with intra-articular bodies demonstrated.  Of note, there is fluid tracking along the flexor hallucis longus  from the ankle joint. There is a moderate subtalar joint effusion  with edema and fluid tracking along the inferior extensor retinaculum  in the sinus tarsi. Reactive marrow edema demonstrated in the lateral  process of the talus.      Anterior inferior tibiofibular ligament demonstrates sequela of  chronic sprain. Posterior inferior tibiofibular ligament is intact.  Distal interosseous tibiofibular ligament is intact.      Anterior talofibular ligament demonstrates sequela of chronic tear  with remote well corticated ossific fragments from the lateral  malleolus about the tip with largest fragment identified measuring 7  mm. Calcaneofibular ligament demonstrates sequela of chronic sprain.  The ligament is seen about the previously noted areas of ossific  fragment. However, no focal  discontinuity in the current examination.  Posterior talofibular ligament is intact.      Posterior tibiotalar portion deep deltoid ligament demonstrates  sequela of chronic sprain with osseous irregularity about the  colliculus. No focal discontinuity demonstrated. Visualized  components superficial deltoid ligament demonstrates sequela of  chronic sprain with chronic thickened tibia navicular portion with  spring ligament complex demonstrates chronic thickened superomedial  portion.      Sinus tarsi demonstrates generalized edema.      Posterior tibial tendon is intact. Flexor digitorum and flexor  hallucis longus tendons are intact. Fluid seen tracking along the  flexor hallucis longus tendon sheath. Tarsal tunnel is intact.      Peroneus brevis and longus are dislocated from the fibular groove  demonstrated along the outer margin of the lateral malleolus in  relation to remote tear of the superior peroneal retinaculum. The  peroneus longus tendon demonstrates mild tendinosis with moderate  peroneus brevis tendinosis. Low-grade partial superficial tear  demonstrated. Tibialis anterior and extensor tendons intact      Achilles tendon intact. Chronic plantar fasciitis demonstrated with  chronic thickened central and lateral cords with moderate calcaneal  spurring demonstrated. Musculature of the foot demonstrates diffuse  atrophy. Midfoot osseous structures demonstrate component of  intertarsal osteoarthritis. There is Lisfranc joint osteoarthritis  with 2nd and 3rd tarsometatarsal joint osteoarthritis demonstrated.  Visualized metatarsal bases are unremarkable.          IMPRESSION:  1. Severe ankle joint osteoarthritis with moderate effusion and  intra-articular debris/synovial hypertrophy. Reactive marrow edema  about the articulation. Moderate subtalar joint effusion demonstrated.  2. Dislocation of the peroneal tendons from the fibular groove in  relation to torn superior peroneal retinaculum. There is  tendinosis  and low-grade partial superficial tear. No tenosynovitis or focal  tendon tear.  3. Chronic medial and lateral collateral ligament sprain. Remote  cortical avulsion fragments in particular about the lateral malleolus.  4. Chronic high ankle sprain  5. Chronic plantar fasciitis        Signed by: Anali Escobedo 10/14/2024 10:38 AM  Dictation workstation:   [id number]  ------------------------------------------------  XR ANKLE BILATERAL 2 VIEWS; ;  10/9/2024 11:50 am      INDICATION:  Signs/Symptoms:left ankle pain, right ankle pain..      ,M25.572 Pain in left ankle and joints of left foot,G89.29 Other  chronic pain,S93.402A Sprain of unspecified ligament of left ankle,  initial encounter,M19.072 Primary osteoarthritis, left ankle and  foot,S93.409A Sprain of unspecified ligament of unspecified ankle,  initial encounter,M21.072 Valgus deformity, not elsewhere classified,  left ankle,M21.70 Unequal limb length (acquired), unspecified site      COMPARISON:  None.      ACCESSION NUMBER(S):  [id number]      ORDERING CLINICIAN:  ASHLEY BARKER      FINDINGS:  Bilateral ankles, two views of each      There is severe joint space narrowing with osteophytosis and  sclerosis involving the left ankle. There is hindfoot valgus  deformity. There is bimalleolar soft tissue edema. No acute fracture  dislocation seen.      No abnormality seen on the right in the ankle. There is moderate  severe joint space narrowing osteophytosis in the right midfoot  articulations of the naviculocuneiform and tarsometatarsal joints      IMPRESSION:  Severe ankle osteoarthritis on the left   hindfoot valgus  3. Moderate severe right midfoot osteoarthritis.   4. No abnormality seen in the right ankle      MACRO:  None      Signed by: Scot Tabares 10/10/2024 7:48 PM  -----------------------------------------------  Right leg shorter than left leg by the following:  Iliac crest:  2.8 mm  Sacral base: 4.8 mm  Midline femoral heads:   level mm  Median difference of the right leg being shorter than left leg is approximately:  3.8 mm     XR PELVIS 1-2 VIEWS; ;  10/9/2024 11:50 am      INDICATION:  Signs/Symptoms:PELVIC PAIN.      ,M25.572 Pain in left ankle and joints of left foot,G89.29 Other  chronic pain,M21.70 Unequal limb length (acquired), unspecified site      COMPARISON:  None.      ACCESSION NUMBER(S):  [id number]      ORDERING CLINICIAN:  ASHLEY SWANSON      FINDINGS:  Pelvis, single view      There is no fracture or dislocation. Mild degenerative changes in the  hips with osteophytosis      IMPRESSION:  No acute abnormality. Mild osteophytosis in the hips          MACRO:  None      Signed by: Scot Tabares 10/10/2024 7:46 PM  Assessment   1. Chronic pain of left ankle        2. DJD (degenerative joint disease), ankle and foot, left        3. Grade 2 ankle sprain        4. Valgus foot deformity, acquired, left        5. Leg length difference, acquired        6. Acute right ankle pain        7. High ankle sprain of left lower extremity, subsequent encounter        8. Peroneal tendon injury, left, subsequent encounter        9. Plantar fasciitis of left foot        10. Arthritis of left ankle            Procedure   Time Out (5 Minutes): Yes  Patient Name: Leanne Mims  YOB: 1950  Procedure: PRP injection #2  Needed Supplies Available: Correct Supplies  Laterality: Left ankle  Site Verified and Marked: Correct Site(s) Marked  Timeout Performed by Provider: Dr. Ashley Swanson, D.O.  Staff Initials: [facility]    Patient is informed and consent has been signed by the patient if over 18 years old., Patient parent and/or legal guardian is informed and consent has been signed., Patient and/or parent and/or legal guardian accept all risks, benefits, and possible complications associated with procedure(s) and/or manipulation(s) and/or injection(s)., Patient and/or parent and/or legal guardian deny patient allergy or known  complications with any of the medications, instruments, products, and/or techniques used., All questions and concerns were answered and/or addressed with the patient and/or parent and/or legal guardian., The patient and/or parent and/or legal guardian agree to proceed with the procedure(s) and/or manipulation(s) and/or injection(s)., Prep: The area was cleansed with a mixture of equal parts rubbing alcohol 70% and Hibclens., Utilizing clean technique, the injection site(s) were marked with a skin marker., and Injection site(s) were anesthestized with topical analgesic spray prior to injection.    Performed regenerative PRP #2 injection into the patients Left ankle under ultrasound     The patients Left Ankle was pre-anesthestized with approximately 11mL of a split solution containinmL of Lidocaine 2% (NDC: 6873-0933-88 LOT#: [id number] EXP: 3/31/2026)  5mL of Xylocaine 2% (NDC 29724-380-95 LOT#: [id number] EXP: 2028)  1mL of Sodium Bicarbonate 8.4% (NDC: 9318-4024-17  LOT#: [id number]  EXP: 2026)    The patients Left ankle was then injected with 12mL of platelet rich plasma (usually the end result contains more than four times blood concentration)    Band-aid and/or compressive bandage was placed over the injection site(s). After the injection(s) performed osteopathic manipulation therapy to the affected area(s) to help increase blood flow to aid with the healing process as well as circulation of the medication. The patient tolerated the procedure well without any complications. The patient was instructed to gently massage the treatment site(s) as well as to apply moist heat to the affected area(s). Additionally, the patient was instructed to contact the office immediately with any complications or concerns.    The  brittany was present in the room during the entire procedure.    The following discharge instructions were reviewed in detail with the patient to the level of their  understanding    PAIN:  A mild to moderate amount of discomfort, tenderness, stiffness, and achiness-caused by the inflammation of the area can be expected for a few days after the injection. This is normal and good as the inflammation is an important part of the healing process.    SKIN: Due to the numbing spray used, you may develop a red, scaly, itchy rash in the area that was sprayed. Should your skin become itchy, wash area with mild soap and warm water. If needed, you may apply topical, over-the-counter Hydrocortisone cream to alleviate any itchiness. AVOID scratching due to risk of infection.    BATHING/SWIMMING: You may shower after your procedure with regular soap and water, but no baths, no swimming, and no hot tubs for 3-4 days after the procedure.    ACTIVITIES:  Immediately following the injection, you may resume daily activities (such as work) and light exercise. We suggest that you refrain from strenuous activity and heavy lifting, particularly the injured body part; for a week post-procedure, but sometimes this can change as well.    MOIST HEAT:  Moist heat may be used on the injection area. NO ICE.    MEDICATION: You may continue your prescribed medications and any over-the-counter supplements; however, it is not recommended to use aspirin or anti-inflammatories (Motrin, Advil, Aleve, Ibuprofen, Naproxen etc.) for up to 2 weeks post treatment. Tylenol Extra Strength, Tylenol Arthritis or any prescribed narcotics or muscle relaxants are OK to take.    APPOINTMENTS: You should have a follow-up appointment with Dr. Swanson scheduled 1-3 weeks as recommended after your procedure for your next round of injections or to follow-up after you have completed your injection series. Please schedule your follow-up appointment on your way out after your procedure, or call the office to schedule these appointments as soon as possible.    PRECAUTIONS: Smoking, caffeine, alcohol, sex, and anti-inflammatory  medications may reduce the effectiveness of PRP injections. Early, rare post procedure problems that can be concerning are as follows: an increase in pain not relieved by medication (over the counter or prescription), a temperature above 101 degrees, bleeding or progressive, extreme swelling, or numbness.     CALL THE OFFICE OR GO TO THE EMERGENCY ROOM IF ANY OF THESE SYMPTOMS OCCUR.   If you have any questions or problems, please call our office at [phone]        Treatment or Intervention:  Continue to only do moist heat, since doing regenerative inflammatory injections  Once again stressed the importance of no ice since doing regenerative inflammatory injections  Continue physical Therapy exercises at home daily   once again reviewed home exercises to be performed by the patient routinely   Continue wearing shoes with good stability control to help with the biomechanics affecting the lower legs  ,   Continue wearing full foot insoles to help with the biomechanics affecting the lower legs  ,   Continue over-the-counter vitamin-D 2 -3000+ milligrams a day, as well as a daily multivitamin.  ,   Continue over-the-counter curcumin, turmeric, boswellia, as well as egg shell membrane as directed to aid with joint inflammation.  ,   Continue over-the-counter Move Free for joint health.  ,   May continue to take OTC Tylenol Extra Strength or OTC Tylenol Arthritis, taking one every 6-8 hours with food as needed for pain management., Patient advised regarding the risks and/or potential adverse reactions and/or side effects of any prescribed medications along with any over-the-counter medications or any supplements used. Patient advised to seek immediate medical care if any adverse reactions occur. The patient and/or patient(s) parent(s) verbalized their understanding.  ,   Discussed in detail with the patient to the level of their understanding the possibility in the future of regenerative injections versus   viscosupplementation injections versus a combination,   Continue using 9mm heel lift in the right shoe. If it feels too much, peel off layer to make it 6mm. If that feels too much then peel off another layer to make the heel lift 3mm  Performed PRP injection #2 into the patients left ankle under ultrasound. The patient tolerated the procedure well and without any adverse effects. Discharge instructions for REGENERATIVE PRP (Platelet Rich Plasma) procedure and injections were reviewed in detail with the patient to the level of their understanding; a copy of these instructions were provided to the patient at the time of this office visit.    Follow up 1-2 weeks for Prolozone #3 or sooner if needed    Please note that this report has been produced using speech recognition software.  It may contain errors related to grammar, punctuation or spelling.  Electronically signed, but not reviewed.  KEITH Martinez, Director of Sports Medicine      ANJELICA BUSTILLOS on 1/3/25 at 9:14 AM.     PRAVIN Martinez DO     Performed Prolozone into the left foot and ankle

## 2025-01-03 ENCOUNTER — OFFICE VISIT (OUTPATIENT)
Dept: SPORTS MEDICINE | Facility: CLINIC | Age: 75
End: 2025-01-03
Payer: MEDICARE

## 2025-01-03 ENCOUNTER — HOSPITAL ENCOUNTER (OUTPATIENT)
Dept: RADIOLOGY | Facility: EXTERNAL LOCATION | Age: 75
Discharge: HOME | End: 2025-01-03

## 2025-01-03 VITALS
BODY MASS INDEX: 27.58 KG/M2 | DIASTOLIC BLOOD PRESSURE: 80 MMHG | WEIGHT: 182 LBS | HEART RATE: 70 BPM | SYSTOLIC BLOOD PRESSURE: 120 MMHG | HEIGHT: 68 IN

## 2025-01-03 DIAGNOSIS — S86.302D PERONEAL TENDON INJURY, LEFT, SUBSEQUENT ENCOUNTER: ICD-10-CM

## 2025-01-03 DIAGNOSIS — M21.072 VALGUS FOOT DEFORMITY, ACQUIRED, LEFT: ICD-10-CM

## 2025-01-03 DIAGNOSIS — M19.072 ARTHRITIS OF LEFT ANKLE: ICD-10-CM

## 2025-01-03 DIAGNOSIS — M25.571 ACUTE RIGHT ANKLE PAIN: ICD-10-CM

## 2025-01-03 DIAGNOSIS — M72.2 PLANTAR FASCIITIS OF LEFT FOOT: ICD-10-CM

## 2025-01-03 DIAGNOSIS — M25.572 CHRONIC PAIN OF LEFT ANKLE: ICD-10-CM

## 2025-01-03 DIAGNOSIS — M19.072 DJD (DEGENERATIVE JOINT DISEASE), ANKLE AND FOOT, LEFT: ICD-10-CM

## 2025-01-03 DIAGNOSIS — M21.70 LEG LENGTH DIFFERENCE, ACQUIRED: ICD-10-CM

## 2025-01-03 DIAGNOSIS — S93.492D HIGH ANKLE SPRAIN OF LEFT LOWER EXTREMITY, SUBSEQUENT ENCOUNTER: ICD-10-CM

## 2025-01-03 DIAGNOSIS — G89.29 CHRONIC PAIN OF LEFT ANKLE: ICD-10-CM

## 2025-01-03 DIAGNOSIS — S93.409A GRADE 2 ANKLE SPRAIN: ICD-10-CM

## 2025-01-03 PROCEDURE — 97035 APP MDLTY 1+ULTRASOUND EA 15: CPT | Performed by: FAMILY MEDICINE

## 2025-01-03 PROCEDURE — 99214 OFFICE O/P EST MOD 30 MIN: CPT | Performed by: FAMILY MEDICINE

## 2025-01-03 PROCEDURE — PRPON PR PRP - ONE BODY PART: Performed by: FAMILY MEDICINE

## 2025-01-03 ASSESSMENT — COLUMBIA-SUICIDE SEVERITY RATING SCALE - C-SSRS
1. IN THE PAST MONTH, HAVE YOU WISHED YOU WERE DEAD OR WISHED YOU COULD GO TO SLEEP AND NOT WAKE UP?: NO
2. HAVE YOU ACTUALLY HAD ANY THOUGHTS OF KILLING YOURSELF?: NO
6. HAVE YOU EVER DONE ANYTHING, STARTED TO DO ANYTHING, OR PREPARED TO DO ANYTHING TO END YOUR LIFE?: NO

## 2025-01-03 ASSESSMENT — PAIN - FUNCTIONAL ASSESSMENT: PAIN_FUNCTIONAL_ASSESSMENT: 0-10

## 2025-01-03 ASSESSMENT — ENCOUNTER SYMPTOMS
DEPRESSION: 0
LOSS OF SENSATION IN FEET: 0
OCCASIONAL FEELINGS OF UNSTEADINESS: 0

## 2025-01-03 ASSESSMENT — PAIN SCALES - GENERAL
PAINLEVEL_OUTOF10: 1
PAINLEVEL_OUTOF10: 1

## 2025-01-03 ASSESSMENT — PATIENT HEALTH QUESTIONNAIRE - PHQ9
SUM OF ALL RESPONSES TO PHQ9 QUESTIONS 1 AND 2: 0
1. LITTLE INTEREST OR PLEASURE IN DOING THINGS: NOT AT ALL
2. FEELING DOWN, DEPRESSED OR HOPELESS: NOT AT ALL

## 2025-01-03 ASSESSMENT — PAIN DESCRIPTION - DESCRIPTORS: DESCRIPTORS: ACHING;SORE

## 2025-01-03 NOTE — PATIENT INSTRUCTIONS
May do only moist heat, since doing regenerative inflammatory injections  Stressed the importance of no ice since doing regenerative inflammatory injections  Continue physical Therapy until done  Once again reviewed home exercises to be performed by the patient routinely  , ,   Once again Stressed the importance of wearing shoes with good stability control to help with the biomechanics affecting the lower legs  ,   Once again Stressed the importance of wearing full foot insoles to help with the biomechanics affecting the lower legs  ,   Once again Recommendation over-the-counter vitamin-D 2 -3000+ milligrams a day, as well as a daily multivitamin.  ,   Once again Recommendation over-the-counter curcumin, turmeric, boswellia, as well as egg shell membrane as directed to aid with joint inflammation.  ,   Once again Recommendation over-the-counter Move Free for joint health.  ,   May continue to take OTC Tylenol Extra Strength or OTC Tylenol Arthritis, taking one every 6-8 hours with food as needed for pain management., Patient advised regarding the risks and/or potential adverse reactions and/or side effects of any prescribed medications along with any over-the-counter medications or any supplements used. Patient advised to seek immediate medical care if any adverse reactions occur. The patient and/or patient(s) parent(s) verbalized their understanding.  ,   Discussed in detail with the patient to the level of their understanding the possibility in the future of regenerative injections versus  viscosupplementation injections versus a combination,   Continue using 9mm heel lift in the right shoe. If it feels too much, peel off layer to make it 6mm. If that feels too much then peel off another layer to make the heel lift 3mm  Performed PRP injection #1 into the patients left ankle under ultrasound. The patient tolerated the procedure well and without any adverse effects. Discharge instructions for REGENERATIVE PRP  (Platelet Rich Plasma) procedure and injections were reviewed in detail with the patient to the level of their understanding; a copy of these instructions were provided to the patient at the time of this office visit.    Follow up 1-2 weeks for Prolozone #3 or sooner if needed

## 2025-01-06 DIAGNOSIS — D49.6 INTRACRANIAL TUMOR (MULTI): ICD-10-CM

## 2025-01-06 DIAGNOSIS — D32.9 MENINGIOMA (MULTI): ICD-10-CM

## 2025-01-06 NOTE — PROGRESS NOTES
Verbal consent of the patient and/or verbal parental consent for patients under the age of 18 have been obtained to conduct a physical examination at this office visit.    Established patient  History Of Present Illness  01/09/25 Leanne Mims is a 74 y.o. female who presents for prolozone #3 of their Left ankle. States she is having 2/10 pain today. States that she was pretty sore after PRP last week for 1-2 days and very swollen but the swelling did go down and she feels better today compared to that. She continues moist heat and ROM exercises for her ankle.  She is very happy overall with the results of the injection she is getting.  She continues to get good results with her activities as well as her movement.  She only has pain now when she is standing when she takes her dog out but is nowhere near as bad as it was previously.      All previous Progress Notes and imaging results related to this patients chief complaint have been reviewed in preparation for this examination.    Past Medical History  She has a past medical history of Anxiety (09/05/2023), Cataract (2023), Dizziness (12/29/2023), Elevated cholesterol (09/05/2023), Essential hypertension (09/05/2023), IFG (impaired fasting glucose) (09/05/2023), Meningioma (Multi) (09/05/2023), Neuropathy (09/05/2023), Personal history of malignant neoplasm of breast (09/05/2023), Primary osteoarthritis involving multiple joints (12/29/2023), and Vertigo (12/29/2023).    Surgical History  She has a past surgical history that includes Breast lumpectomy (2017) and Joint replacement (Left, 05/21/2024).     Social History  She reports that she has never smoked. She has never been exposed to tobacco smoke. She has never used smokeless tobacco. She reports current alcohol use of about 3.0 standard drinks of alcohol per week. She reports that she does not use drugs.    Family History  Family History   Problem Relation Name Age of Onset    Heart disease Mother Eunice  Adeel     Hypertension Mother Eunice Denis     Cancer Father Layo Denis    History Of Present Illness  10/09/24 Leanne Mims is a 74 y.o. female who presents for an evaluation of their Left ankle. Arrives with her significant other Elliot. States that 5 years ago she sprained her ankle on vacation and was able to tolerate walking on it with an ankle brace. Has been a minor discomfort since then. In May she had a TKR on the left leg and has been and since then the left ankle pain has resurfaced and worsened. She states a 6/10 grinding type pain. She wears a supportive ankle brace regularly. She was seen by [facility] and had XR completed on 9/27 where they found arthritic changes. States that she will take advil prn for pain management. States that she goes up and down stairs one leg at a time. States that her pain is relatively the same regardless of activity.  ----------------------------------------  10/22/24 Leanne Mims is a 74 y.o. female who presents for MRI review of their Left ankle. States that she is not able to get into PT until December. States that she is feeling the same as when she was last seen. She arrives wearing her ankle brace. She states no new or worsening symptoms.   She does not want surgical intervention especially and ankle fusion.  She says it does not give her enough pain that she feels require surgery at this time however she would be interested in the regenerative injections as well as the joint lubrication injections if they would help.   ----------------------------------------  10/24/24 Leanne Mims is a 74 y.o. female who presents for Prolozone #1 inj of their Left ankle. She is unchanged from last seen yesterday she says she is excited to try these injections because of how good she heard they did for some of her friends especially her significant other Al unchanged since previous visit unchanged since previous visitvin.  She still would like to try to avoid surgical  intervention is much as possible because she does not want an ankle fusion whatsoever with some of the surgeons have told her.    She says it is uncomfortable but she is able to deal with it.  Unchanged since previous visit  ----------------------------------------  10/28/24 Leanne Mims is a 74 y.o. female who presents for PRP #1 inj of their Left ankle. She is unchanged from her last visit.  She says she was a little sore after the Prolozone injection but afterwards it felt pretty good.  She says once again she is excited to continue on with these injections because of how good she heard they did for some of her friends especially her significant other Al.  Plus additionally she would like to try to avoid surgical intervention is much as possible.  She says it is uncomfortable but she is able to deal with it.   ---------------------------------------  12/03/24 Leanne Mims is a 74 y.o. female who presents for Prolozone #2 inj of their Left ankle. States that she is feeling 2/10 pain today. States she has felt significant improvement since the PRP injection. She feels the pain is less consistent and less severe. She has recently switched to a more compression style of ankle brace and feels that helps her more. No new or worsening symptoms.   ----------------------------------------  01/03/25 Leanne Mims is a 74 y.o. female who presents for PRP #2 inj of their Left ankle. Patient arrives today with 1/10 pain in the left ankle. States that her pain will increase to a 4/10 with prolonged walking and standing and usually worse at the end of the day. States that she continues home exercises and heat for pain management. She arrives with good shoes and supportive insoles with her heel lift. Patient states that she feels her regenerative injections have helped tremendously with her left ankle condition.   She says overall she has been very happy with the results of the injections not only with her range of  motion but also her strength.  She says it does not feel like it catches and gives out on her like it did previously.    Allergies  Penicillins    Review of Systems  CONSTITUTIONAL:   Negative for weight change, loss of appetite, fatigue, weakness, fever, chills, night sweats, headaches .           HEENT:   Negative for cold, cough, sore throat, sinus pain, swollen lymph nodes.           OPHTHALMOLOGY:   Negative for diminished vision, blurred vision, loss of vision, double vision.           ALLERGY:   Negative for runny nose, scratchy throat, sinus congestion, rash, facial pressure, nasal congestion, post-nasal drip.           CARDIOLOGY:   Negative for chest pain, palpitations, murmurs, irregular heart beat, shortness of breath, leg edema, dyspnea on exertion, fatigue, dizziness.           RESPIRATORY:   Negative for chest pain, shortness of breath, swelling of the legs, asthma/copd, chest congestion, pain with breathing .           GASTROENTEROLOGY:   Negative for nausea, vomitting, heartburn, constipation, diarrhea, blood in stool, change in bowel habits, black stool.           HEMATOLOGY/LYMPH:   Negative for fatigue, loss of appetitie, easy bruising, easy bleeding, anemia, abnormal bleeding, slow healing.           ENDOCRINOLOGY:   Negative for polyuria, polydipsia, polyphagia, fatigue, weight loss, weight gain, cold intolerance, heat intolerance, diabetes.           MUSCULOSKELETAL:   Positive  for Left ankle        DERMATOLOGY:   Negative for rash, bruising.           NEUROLOGY:   Negative for tingling, numbness, gait abnormality, paresthesias, weakness, sciatica.        Examination: Overall continued improvement with regenerative injections   Left Lateral Lateral Malleolus and Fibula  Edema: Negative   ecchymosis/Bruising: Negative  Percussion: Negative   tuning Fork Test: Negative    Orientation:Overall continued improvement with regenerative injections   Symmetrical           ROM:Overall continued  improvement with regenerative injections   Positive, Decreased.  Plantarflexion and dorsiflexion as well as inversion and eversion as well as them in combination because of significant arthritic changes           Muscle Strength:Overall continued improvement with regenerative injections   Positive +4-+5/+5 Planar Flexion,   Positive  +4-+5/+5  Dorsi Flexion,         Positive  +4-+5/+5  Inversion, still   Positive  +4-+5/+5  Eversion, still       Positive  +4-+5/+5  Plantarflexion in combination with inversion,still    +5/+5 Plantarflexion in combination with eversion          Positive +4-+5/+5  Dorsiflexion in combination with inversion (Posterior Tibialis),still    +5/+5 Dorsiflexion in combination with eversion (Peroneal Brevis)  +5/+5 Dorsiflexion in combination with eversion and flexion of great toe (Peroneal Longus).       Palpation:Overall continued improvement with regenerative injections   Positive, still some minimal tender to Palpation lateral mallelous and ATF > CF > PTF.  And additionally anterior inferior tib-fib ligament           Vascular:   +2/+4 Dorsalis Pedis  +2/+4 Posterior Tibialis  Capillary Refill < 2 Seconds.               Leg/Ankle/Foot - Ankle Impingement:  Posterior Ankle Impingement Test: Negative.   Anterior Ankle Impingement Test: Negative.         Leg/Ankle/Foot - Ankle Instability:  Overall continued improvement with regenerative injections   Flexibility Test:  Negative.   Anterior Drawer Test:  Positive   Talar Tilt Test:  Positive  External Rotation Kleiger Plantar Flexion Test:  Negative.   External Rotation Kleiger Dorsiflexion Test:  Negative.   Squeeze Test:  Positive  Dorsiflexion Test:  Negative.   Posterior Tibial Instability Test: Negative.  Peroneal Tendon Instability Test:  Positive  Horizontal Squeeze Test:  Negative.   Vertical Squeeze Test:  Negative.   Standing/Walking Test: Positive, Painful.   Hop Test:  Positive          Leg/Ankle/Foot - DVT:  Justina's Sign:  Negative.            Leg/Ankle/Foot - Hindfoot Achilles/Calcaneus:  Kimble Compression Test: Negative.   Hoffa Sign: Negative.   Achilles Tendon Tap Test: Negative.   Heel Compression Test: Negative.            Feet/Foot:   Positive  Left Valgus foot        Imaging and Diagnostics Review:  nterpreted By:  Anali Escobedo,   STUDY:  MR ANKLE LEFT WO IV CONTRAST; ;  10/14/2024 8:36 am      INDICATION:  Signs/Symptoms:LEFT ANKLE PAIN.      ,M25.572 Pain in left ankle and joints of left foot,G89.29 Other  chronic pain,S93.402A Sprain of unspecified ligament of left ankle,  initial encounter,M19.072 Primary osteoarthritis, left ankle and  foot,S93.409A Sprain of unspecified ligament of unspecified ankle,  initial encounter,M21.072 Valgus deformity, not elsewhere classified,  left ankle,M21.70 Unequal limb length (acquired), unspecified site      COMPARISON:  X-ray 10/09/2024      ACCESSION NUMBER(S):  [id number]      ORDERING CLINICIAN:  ASHLEY BARKER      TECHNIQUE:  Multiplanar multisequence MRI obtained of the left ankle.      FINDINGS:  Ankle joint demonstrates a moderate effusion with intermediate signal  intensity and debris/synovial hypertrophy about the articulation.  There is severe loss of articular cartilage with mild remodeling of  the subchondral bone plate in particular lateral talar dome as well  as corresponding lateral tibial plafond. Mild reactive marrow edema  demonstrated in this area. Moderate fluid in the common posterior  recess of the ankle joint with intra-articular bodies demonstrated.  Of note, there is fluid tracking along the flexor hallucis longus  from the ankle joint. There is a moderate subtalar joint effusion  with edema and fluid tracking along the inferior extensor retinaculum  in the sinus tarsi. Reactive marrow edema demonstrated in the lateral  process of the talus.      Anterior inferior tibiofibular ligament demonstrates sequela of  chronic sprain. Posterior inferior  tibiofibular ligament is intact.  Distal interosseous tibiofibular ligament is intact.      Anterior talofibular ligament demonstrates sequela of chronic tear  with remote well corticated ossific fragments from the lateral  malleolus about the tip with largest fragment identified measuring 7  mm. Calcaneofibular ligament demonstrates sequela of chronic sprain.  The ligament is seen about the previously noted areas of ossific  fragment. However, no focal discontinuity in the current examination.  Posterior talofibular ligament is intact.      Posterior tibiotalar portion deep deltoid ligament demonstrates  sequela of chronic sprain with osseous irregularity about the  colliculus. No focal discontinuity demonstrated. Visualized  components superficial deltoid ligament demonstrates sequela of  chronic sprain with chronic thickened tibia navicular portion with  spring ligament complex demonstrates chronic thickened superomedial  portion.      Sinus tarsi demonstrates generalized edema.      Posterior tibial tendon is intact. Flexor digitorum and flexor  hallucis longus tendons are intact. Fluid seen tracking along the  flexor hallucis longus tendon sheath. Tarsal tunnel is intact.      Peroneus brevis and longus are dislocated from the fibular groove  demonstrated along the outer margin of the lateral malleolus in  relation to remote tear of the superior peroneal retinaculum. The  peroneus longus tendon demonstrates mild tendinosis with moderate  peroneus brevis tendinosis. Low-grade partial superficial tear  demonstrated. Tibialis anterior and extensor tendons intact      Achilles tendon intact. Chronic plantar fasciitis demonstrated with  chronic thickened central and lateral cords with moderate calcaneal  spurring demonstrated. Musculature of the foot demonstrates diffuse  atrophy. Midfoot osseous structures demonstrate component of  intertarsal osteoarthritis. There is Lisfranc joint osteoarthritis  with 2nd and 3rd  tarsometatarsal joint osteoarthritis demonstrated.  Visualized metatarsal bases are unremarkable.          IMPRESSION:  1. Severe ankle joint osteoarthritis with moderate effusion and  intra-articular debris/synovial hypertrophy. Reactive marrow edema  about the articulation. Moderate subtalar joint effusion demonstrated.  2. Dislocation of the peroneal tendons from the fibular groove in  relation to torn superior peroneal retinaculum. There is tendinosis  and low-grade partial superficial tear. No tenosynovitis or focal  tendon tear.  3. Chronic medial and lateral collateral ligament sprain. Remote  cortical avulsion fragments in particular about the lateral malleolus.  4. Chronic high ankle sprain  5. Chronic plantar fasciitis        Signed by: Anali Escobedo 10/14/2024 10:38 AM  Dictation workstation:   [id number]  ------------------------------------------------  XR ANKLE BILATERAL 2 VIEWS; ;  10/9/2024 11:50 am      INDICATION:  Signs/Symptoms:left ankle pain, right ankle pain..      ,M25.572 Pain in left ankle and joints of left foot,G89.29 Other  chronic pain,S93.402A Sprain of unspecified ligament of left ankle,  initial encounter,M19.072 Primary osteoarthritis, left ankle and  foot,S93.409A Sprain of unspecified ligament of unspecified ankle,  initial encounter,M21.072 Valgus deformity, not elsewhere classified,  left ankle,M21.70 Unequal limb length (acquired), unspecified site      COMPARISON:  None.      ACCESSION NUMBER(S):  [id number]      ORDERING CLINICIAN:  ASHLEY BARKER      FINDINGS:  Bilateral ankles, two views of each      There is severe joint space narrowing with osteophytosis and  sclerosis involving the left ankle. There is hindfoot valgus  deformity. There is bimalleolar soft tissue edema. No acute fracture  dislocation seen.      No abnormality seen on the right in the ankle. There is moderate  severe joint space narrowing osteophytosis in the right midfoot  articulations of the  naviculocuneiform and tarsometatarsal joints      IMPRESSION:  Severe ankle osteoarthritis on the left   hindfoot valgus  3. Moderate severe right midfoot osteoarthritis.   4. No abnormality seen in the right ankle      MACRO:  None      Signed by: Scot Tabares 10/10/2024 7:48 PM  -----------------------------------------------  Right leg shorter than left leg by the following:  Iliac crest:  2.8 mm  Sacral base: 4.8 mm  Midline femoral heads:  level mm  Median difference of the right leg being shorter than left leg is approximately:  3.8 mm     XR PELVIS 1-2 VIEWS; ;  10/9/2024 11:50 am      INDICATION:  Signs/Symptoms:PELVIC PAIN.      ,M25.572 Pain in left ankle and joints of left foot,G89.29 Other  chronic pain,M21.70 Unequal limb length (acquired), unspecified site      COMPARISON:  None.      ACCESSION NUMBER(S):  [id number]      ORDERING CLINICIAN:  ASHLEY BARKER      FINDINGS:  Pelvis, single view      There is no fracture or dislocation. Mild degenerative changes in the  hips with osteophytosis      IMPRESSION:  No acute abnormality. Mild osteophytosis in the hips          MACRO:  None      Signed by: Scot Tabares 10/10/2024 7:46 PM  Assessment   1. Chronic pain of left ankle        2. DJD (degenerative joint disease), ankle and foot, left        3. Grade 2 ankle sprain        4. Valgus foot deformity, acquired, left        5. Leg length difference, acquired        6. Acute right ankle pain        7. High ankle sprain of left lower extremity, subsequent encounter        8. Peroneal tendon injury, left, subsequent encounter        9. Plantar fasciitis of left foot        10. Arthritis of left ankle            Procedure  Time Out (5 Minutes): Yes  Patient Name: Leanne Mims  YOB: 1950  Procedure: Prolozone injection #3   eeded Supplies Available: Correct Supplies  Laterality: Left Ankle  Site Verified and Marked: Correct Site(s) Marked  Timeout Performed by Provider: Dr. Ashley CASILLAS  KEITH Swanson  Staff Initials: [facility]    Patient is informed and consent has been signed by the patient if over 18 years old.   Patient parent and/or legal guardian is informed and consent has been signed.   Patient and/or parent and/or legal guardian accept all risks, benefits, and possible complications associated with procedure(s) and/or manipulation(s) and/or injection(s).  Patient and/or parent and/or legal guardian deny patient allergy or known complications with any of the medications, instruments, products, and/or techniques used.  All questions and concerns were answered and/or addressed with the patient and/or parent and/or legal guardian.  The patient and/or parent and/or legal guardian agree to proceed with the procedure(s) and/or manipulation(s) and/or injection(s).  Prep: The area was cleansed with a mixture of equal parts rubbing alcohol 70% and Hibclens.  Utilizing clean technique, the injection site(s) were marked with a skin marker.   Injection site(s) were anesthestized with topical analgesic spray prior to injection.    Performed as a separate, cash-based service regenerative Prolozone injection #3 into the patients Left ankle, under ultrasound.    Prolozone mixture of:  2mL Lidocaine 2% (NDC: 9544-2329-34 LOT#: [id number] EXP: 3/31/2026)  0.1mL Sodium Bicarbonate 8.4% (NDC: 8949-3706-72  LOT#: [id number]  EXP: 08/31/2026)  1mL Vitamin B12 (NDC: 47719-744-93; LOT#: [id number] EXP: 5/31/2026)   0.1mL Folic Acid (NDC: 78248-053-90; LOT#: [id number] EXP: 8/31/2025)  0.1mL Vitamin B Complex (NDC: 40346-791-14; LOT#: [id number] EXP: 6/30/2025)  1mL 50% Dextrose (NDC: [address]-2; LOT#: [id number] EXP: 05/1/2026)  1.7mL Biocean Marine Plasma (NO NDC)  Followed by injection of Ozone 20mg/ml without removing the needle    Band-aid and/or compressive bandage was placed over the injection site(s). After the injection(s) performed osteopathic manipulation therapy to the affected area(s) to help increase blood flow to aid with  the healing process as well as circulation of the medication. The patient tolerated the procedure well without any complications. The patient was instructed to gently massage the treatment site(s) as well as to apply moist heat to the affected area(s). Additionally, the patient was instructed to contact the office immediately with any complications or concerns.    The  brittany was present in the room during the entire procedure.        The following discharge instructions were reviewed in detail with the patient to the level of their understanding:    PAIN:  A mild to moderate amount of discomfort, tenderness, stiffness, and achiness-caused by the inflammation of the area can be expected for a few days after the injection. This is normal and good as the inflammation is an important part of the healing process.    SKIN: Due to the numbing spray used, you may develop a red, itchy, scaly rash in the area that was sprayed. Should your skin become itchy, wash the area with mild soap and warm water. If needed, you may apply topical over-the-counter Hydrocortisone cream to alleviate any itchiness. AVOID scratching due to risk of infection.,     BATHING/SWIMMING: You may shower after your procedure with regular soap and water, but no baths, no swimming, and no hot tubs for 3-4 days after the procedure.,     ACTIVITIES:  Immediately following the injection, you may resume daily activities (such as work) and light exercise. We suggest that you refrain from strenuous activity and heavy lifting, particularly the injured body part, for a week post-procedure, but sometimes this can change as well.    MOIST HEAT/ICE:  Moist heat may be used on the injection area. NO ICE.  MEDICATION: You may continue your prescribed medications and any over-the-counter supplements; however, it is not recommended to use aspirin or anti-inflammatories (Motrin, Advil, Aleve, Ibuprofen, Naproxen etc.) for up to 2 weeks post procedure.  Tylenol Extra Strength, Tylenol Arthritis and/or any prescribed narcotics or muscle relaxants are OK to take.    APPOINTMENTS: You should have a follow-up appointment with Dr. Swanson scheduled 1-3 weeks as recommended after your procedure for your next round of injections or to follow-up after you have completed your injection series. Please schedule your follow-up appointment on your way out after your procedure, or call the office to schedule these appointments as soon as possible.    PRECAUTIONS: Smoking, caffeine, alcohol, sex and anti-inflammatories post-procedure may reduce the effectiveness of Prolotherapy/Neural Prolotherapy/Prolozone injections. Early, rare post procedure problems that can be concerning are as follows: an increase in pain not relieved by medication (over the counter or prescription), a temperature above 101 degrees, bleeding or progressive, extreme swelling, or numbness.     CALL THE OFFICE OR GO TO THE EMERGENCY ROOM IF ANY OF THESE SYMPTOMS OCCUR.   If you have any questions or problems, please call our office at [phone]           Treatment or Intervention:  Continue to only do moist heat, since doing regenerative inflammatory injections  Once again stressed the importance of no ice since doing regenerative inflammatory injections  Continue physical Therapy exercises at home daily   once again reviewed home exercises to be performed by the patient routinely   Continue wearing shoes with good stability control to help with the biomechanics affecting the lower legs  ,   Continue wearing full foot insoles to help with the biomechanics affecting the lower legs  ,   Continue over-the-counter vitamin-D 2 -3000+ milligrams a day, as well as a daily multivitamin.  ,   Continue over-the-counter curcumin, turmeric, boswellia, as well as egg shell membrane as directed to aid with joint inflammation.  ,   Continue over-the-counter Move Free for joint health.  ,   May continue to take OTC Tylenol  Extra Strength or OTC Tylenol Arthritis, taking one every 6-8 hours with food as needed for pain management., Patient advised regarding the risks and/or potential adverse reactions and/or side effects of any prescribed medications along with any over-the-counter medications or any supplements used. Patient advised to seek immediate medical care if any adverse reactions occur. The patient and/or patient(s) parent(s) verbalized their understanding.  ,   Discussed in detail with the patient to the level of their understanding the possibility in the future of regenerative injections versus  viscosupplementation injections versus a combination,   Continue using 9mm heel lift in the right shoe. If it feels too much, peel off layer to make it 6mm. If that feels too much then peel off another layer to make the heel lift 3mm  Performed Prolozone injection #3 into the patients left ankle under ultrasound. The patient tolerated the procedure well and without any adverse effects. Discharge instructions for REGENERATIVE PROLOZONE injections were reviewed in detail with the patient to the level of their understanding; a copy of these instructions were provided to the patient at the time of this office visit.    Follow up as needed  Please note that this report has been produced using speech recognition software.  It may contain errors related to grammar, punctuation or spelling.  Electronically signed, but not reviewed.  KEITH Martinez, Director of Sports Medicine      ANJELICA BUSTILLOS on 1/9/25 at 7:01 AM.     PRAVIN Martinez DO     Performed Prolozone into the left foot and ankle

## 2025-01-09 ENCOUNTER — HOSPITAL ENCOUNTER (OUTPATIENT)
Dept: RADIOLOGY | Facility: EXTERNAL LOCATION | Age: 75
Discharge: HOME | End: 2025-01-09

## 2025-01-09 ENCOUNTER — OFFICE VISIT (OUTPATIENT)
Dept: SPORTS MEDICINE | Facility: CLINIC | Age: 75
End: 2025-01-09
Payer: MEDICARE

## 2025-01-09 VITALS
HEIGHT: 68 IN | DIASTOLIC BLOOD PRESSURE: 80 MMHG | BODY MASS INDEX: 27.58 KG/M2 | SYSTOLIC BLOOD PRESSURE: 120 MMHG | WEIGHT: 182 LBS | HEART RATE: 70 BPM

## 2025-01-09 DIAGNOSIS — M19.072 DJD (DEGENERATIVE JOINT DISEASE), ANKLE AND FOOT, LEFT: ICD-10-CM

## 2025-01-09 DIAGNOSIS — M72.2 PLANTAR FASCIITIS OF LEFT FOOT: ICD-10-CM

## 2025-01-09 DIAGNOSIS — S93.409A GRADE 2 ANKLE SPRAIN: ICD-10-CM

## 2025-01-09 DIAGNOSIS — S86.302D PERONEAL TENDON INJURY, LEFT, SUBSEQUENT ENCOUNTER: ICD-10-CM

## 2025-01-09 DIAGNOSIS — G89.29 CHRONIC PAIN OF LEFT ANKLE: ICD-10-CM

## 2025-01-09 DIAGNOSIS — M21.072 VALGUS FOOT DEFORMITY, ACQUIRED, LEFT: ICD-10-CM

## 2025-01-09 DIAGNOSIS — S93.492D HIGH ANKLE SPRAIN OF LEFT LOWER EXTREMITY, SUBSEQUENT ENCOUNTER: ICD-10-CM

## 2025-01-09 DIAGNOSIS — M21.70 LEG LENGTH DIFFERENCE, ACQUIRED: ICD-10-CM

## 2025-01-09 DIAGNOSIS — M25.571 ACUTE RIGHT ANKLE PAIN: ICD-10-CM

## 2025-01-09 DIAGNOSIS — M25.572 CHRONIC PAIN OF LEFT ANKLE: ICD-10-CM

## 2025-01-09 DIAGNOSIS — M19.072 ARTHRITIS OF LEFT ANKLE: ICD-10-CM

## 2025-01-09 PROCEDURE — 99214 OFFICE O/P EST MOD 30 MIN: CPT | Performed by: FAMILY MEDICINE

## 2025-01-09 PROCEDURE — ZASUS: Performed by: FAMILY MEDICINE

## 2025-01-09 ASSESSMENT — ENCOUNTER SYMPTOMS
LOSS OF SENSATION IN FEET: 0
OCCASIONAL FEELINGS OF UNSTEADINESS: 0
DEPRESSION: 0

## 2025-01-09 ASSESSMENT — PAIN DESCRIPTION - DESCRIPTORS: DESCRIPTORS: ACHING;SORE

## 2025-01-09 ASSESSMENT — PAIN - FUNCTIONAL ASSESSMENT: PAIN_FUNCTIONAL_ASSESSMENT: 0-10

## 2025-01-09 ASSESSMENT — PAIN SCALES - GENERAL
PAINLEVEL_OUTOF10: 2
PAINLEVEL_OUTOF10: 2

## 2025-01-29 DIAGNOSIS — I10 ESSENTIAL HYPERTENSION: ICD-10-CM

## 2025-01-29 RX ORDER — ATENOLOL 50 MG/1
50 TABLET ORAL DAILY
Qty: 90 TABLET | Refills: 2 | Status: SHIPPED | OUTPATIENT
Start: 2025-01-29

## 2025-04-23 ENCOUNTER — APPOINTMENT (OUTPATIENT)
Dept: PRIMARY CARE | Facility: CLINIC | Age: 75
End: 2025-04-23
Payer: MEDICARE

## 2025-05-01 DIAGNOSIS — I10 ESSENTIAL HYPERTENSION: ICD-10-CM

## 2025-05-01 RX ORDER — LOSARTAN POTASSIUM AND HYDROCHLOROTHIAZIDE 12.5; 1 MG/1; MG/1
1 TABLET ORAL DAILY
Qty: 90 TABLET | Refills: 0 | Status: SHIPPED | OUTPATIENT
Start: 2025-05-01

## 2025-05-01 NOTE — TELEPHONE ENCOUNTER
Refill- [facility] [address] - losartan/hydrochlorothiazide 100-12.5 mg    Lv 7/7/24 Ilir menendez 5/6/25 Michelle

## 2025-05-03 LAB
ALBUMIN SERPL-MCNC: 4.4 G/DL (ref 3.6–5.1)
ALP SERPL-CCNC: 83 U/L (ref 37–153)
ALT SERPL-CCNC: 15 U/L (ref 6–29)
ANION GAP SERPL CALCULATED.4IONS-SCNC: 10 MMOL/L (CALC) (ref 7–17)
AST SERPL-CCNC: 16 U/L (ref 10–35)
BILIRUB SERPL-MCNC: 0.5 MG/DL (ref 0.2–1.2)
BUN SERPL-MCNC: 21 MG/DL (ref 7–25)
CALCIUM SERPL-MCNC: 10 MG/DL (ref 8.6–10.4)
CHLORIDE SERPL-SCNC: 106 MMOL/L (ref 98–110)
CHOLEST SERPL-MCNC: 270 MG/DL
CHOLEST/HDLC SERPL: 5.1 (CALC)
CO2 SERPL-SCNC: 24 MMOL/L (ref 20–32)
CREAT SERPL-MCNC: 0.9 MG/DL (ref 0.6–1)
EGFRCR SERPLBLD CKD-EPI 2021: 67 ML/MIN/1.73M2
EST. AVERAGE GLUCOSE BLD GHB EST-MCNC: 131 MG/DL
EST. AVERAGE GLUCOSE BLD GHB EST-SCNC: 7.3 MMOL/L
GLUCOSE SERPL-MCNC: 104 MG/DL (ref 65–99)
HBA1C MFR BLD: 6.2 %
HDLC SERPL-MCNC: 53 MG/DL
LDLC SERPL CALC-MCNC: 181 MG/DL (CALC)
NONHDLC SERPL-MCNC: 217 MG/DL (CALC)
POTASSIUM SERPL-SCNC: 4.8 MMOL/L (ref 3.5–5.3)
PROT SERPL-MCNC: 7.1 G/DL (ref 6.1–8.1)
SODIUM SERPL-SCNC: 140 MMOL/L (ref 135–146)
TRIGL SERPL-MCNC: 200 MG/DL

## 2025-05-06 ENCOUNTER — OFFICE VISIT (OUTPATIENT)
Dept: PRIMARY CARE | Facility: CLINIC | Age: 75
End: 2025-05-06
Payer: MEDICARE

## 2025-05-06 VITALS
HEART RATE: 65 BPM | OXYGEN SATURATION: 97 % | DIASTOLIC BLOOD PRESSURE: 76 MMHG | WEIGHT: 196 LBS | TEMPERATURE: 96.1 F | BODY MASS INDEX: 29.7 KG/M2 | SYSTOLIC BLOOD PRESSURE: 144 MMHG | HEIGHT: 68 IN

## 2025-05-06 DIAGNOSIS — B35.1 ONYCHOMYCOSIS: ICD-10-CM

## 2025-05-06 DIAGNOSIS — E78.2 MIXED HYPERLIPIDEMIA: ICD-10-CM

## 2025-05-06 DIAGNOSIS — E55.9 VITAMIN D DEFICIENCY: ICD-10-CM

## 2025-05-06 DIAGNOSIS — I10 PRIMARY HYPERTENSION: ICD-10-CM

## 2025-05-06 DIAGNOSIS — Z00.00 ENCOUNTER FOR MEDICARE ANNUAL WELLNESS EXAM: Primary | ICD-10-CM

## 2025-05-06 DIAGNOSIS — F41.9 ANXIETY: ICD-10-CM

## 2025-05-06 DIAGNOSIS — M25.572 CHRONIC PAIN OF LEFT ANKLE: ICD-10-CM

## 2025-05-06 DIAGNOSIS — C50.912 LOBULAR BREAST CANCER, LEFT: ICD-10-CM

## 2025-05-06 DIAGNOSIS — G89.29 CHRONIC PAIN OF LEFT ANKLE: ICD-10-CM

## 2025-05-06 PROCEDURE — G0439 PPPS, SUBSEQ VISIT: HCPCS | Performed by: NURSE PRACTITIONER

## 2025-05-06 PROCEDURE — 1158F ADVNC CARE PLAN TLK DOCD: CPT | Performed by: NURSE PRACTITIONER

## 2025-05-06 PROCEDURE — 3078F DIAST BP <80 MM HG: CPT | Performed by: NURSE PRACTITIONER

## 2025-05-06 PROCEDURE — 99213 OFFICE O/P EST LOW 20 MIN: CPT | Performed by: NURSE PRACTITIONER

## 2025-05-06 PROCEDURE — 99215 OFFICE O/P EST HI 40 MIN: CPT | Performed by: NURSE PRACTITIONER

## 2025-05-06 PROCEDURE — 1126F AMNT PAIN NOTED NONE PRSNT: CPT | Performed by: NURSE PRACTITIONER

## 2025-05-06 PROCEDURE — 1159F MED LIST DOCD IN RCRD: CPT | Performed by: NURSE PRACTITIONER

## 2025-05-06 PROCEDURE — 1160F RVW MEDS BY RX/DR IN RCRD: CPT | Performed by: NURSE PRACTITIONER

## 2025-05-06 PROCEDURE — 1036F TOBACCO NON-USER: CPT | Performed by: NURSE PRACTITIONER

## 2025-05-06 PROCEDURE — 3077F SYST BP >= 140 MM HG: CPT | Performed by: NURSE PRACTITIONER

## 2025-05-06 PROCEDURE — 1123F ACP DISCUSS/DSCN MKR DOCD: CPT | Performed by: NURSE PRACTITIONER

## 2025-05-06 RX ORDER — TERBINAFINE HYDROCHLORIDE 250 MG/1
250 TABLET ORAL DAILY
Qty: 42 TABLET | Refills: 0 | Status: SHIPPED | OUTPATIENT
Start: 2025-05-06

## 2025-05-06 RX ORDER — PRAVASTATIN SODIUM 40 MG/1
40 TABLET ORAL DAILY
Start: 2025-05-06

## 2025-05-06 RX ORDER — LOSARTAN POTASSIUM AND HYDROCHLOROTHIAZIDE 12.5; 1 MG/1; MG/1
1 TABLET ORAL DAILY
Qty: 90 TABLET | Refills: 3 | Status: SHIPPED | OUTPATIENT
Start: 2025-05-06

## 2025-05-06 RX ORDER — ATENOLOL 50 MG/1
50 TABLET ORAL DAILY
Qty: 90 TABLET | Refills: 3 | Status: SHIPPED | OUTPATIENT
Start: 2025-05-06

## 2025-05-06 ASSESSMENT — ENCOUNTER SYMPTOMS
HEADACHES: 0
HEMATURIA: 0
ABDOMINAL PAIN: 0
SEIZURES: 0
FEVER: 0
LOSS OF SENSATION IN FEET: 0
AGITATION: 0
DIAPHORESIS: 0
NECK PAIN: 0
BACK PAIN: 0
WOUND: 0
NAUSEA: 0
ADENOPATHY: 0
BLOOD IN STOOL: 0
POLYDIPSIA: 0
DYSURIA: 0
OCCASIONAL FEELINGS OF UNSTEADINESS: 0
SHORTNESS OF BREATH: 0
DEPRESSION: 0
VOMITING: 0
ARTHRALGIAS: 1
FATIGUE: 0
FLANK PAIN: 0
SPEECH DIFFICULTY: 0
CHEST TIGHTNESS: 0
PALPITATIONS: 0
CHILLS: 0
POLYPHAGIA: 0
FACIAL ASYMMETRY: 0
DIZZINESS: 0
COUGH: 0
BRUISES/BLEEDS EASILY: 0

## 2025-05-06 ASSESSMENT — LIFESTYLE VARIABLES
AUDIT TOTAL SCORE: 3
HOW OFTEN DURING THE LAST YEAR HAVE YOU FOUND THAT YOU WERE NOT ABLE TO STOP DRINKING ONCE YOU HAD STARTED: NEVER
HOW OFTEN DO YOU HAVE A DRINK CONTAINING ALCOHOL: 2-3 TIMES A WEEK
SKIP TO QUESTIONS 9-10: 1
HOW OFTEN DO YOU HAVE SIX OR MORE DRINKS ON ONE OCCASION: NEVER
HOW OFTEN DURING THE LAST YEAR HAVE YOU BEEN UNABLE TO REMEMBER WHAT HAPPENED THE NIGHT BEFORE BECAUSE YOU HAD BEEN DRINKING: NEVER
HOW OFTEN DURING THE LAST YEAR HAVE YOU FAILED TO DO WHAT WAS NORMALLY EXPECTED FROM YOU BECAUSE OF DRINKING: NEVER
HOW OFTEN DURING THE LAST YEAR HAVE YOU HAD A FEELING OF GUILT OR REMORSE AFTER DRINKING: NEVER
HAVE YOU OR SOMEONE ELSE BEEN INJURED AS A RESULT OF YOUR DRINKING: NO
HOW OFTEN DURING THE LAST YEAR HAVE YOU NEEDED AN ALCOHOLIC DRINK FIRST THING IN THE MORNING TO GET YOURSELF GOING AFTER A NIGHT OF HEAVY DRINKING: NEVER
AUDIT-C TOTAL SCORE: 3
HAS A RELATIVE, FRIEND, DOCTOR, OR ANOTHER HEALTH PROFESSIONAL EXPRESSED CONCERN ABOUT YOUR DRINKING OR SUGGESTED YOU CUT DOWN: NO
HOW MANY STANDARD DRINKS CONTAINING ALCOHOL DO YOU HAVE ON A TYPICAL DAY: 1 OR 2

## 2025-05-06 ASSESSMENT — PAIN SCALES - GENERAL: PAINLEVEL_OUTOF10: 0-NO PAIN

## 2025-05-06 NOTE — PROGRESS NOTES
[facility]: [facility]  MEDICARE WELLNESS EXAM      Leanne Mims is a 74 y.o. female that is presenting today to establish with PCP. Due for Annual Medicare Wellness Exam.    Ms. Mims reports taking antihypertensives as directed. She is trying to follow a low sodium diet. She is not monitoring home BP. Denies CP, SOB, dizziness, syncope or HA.    She reports she is no longer taking statin, she stopped on her own 01/2024.  05/02/25 , , HDL 53, . Encouraged to resume. ASCVD Risk 26.5%.    Anxiety is reported by patient to be managed effectively with SSRI. She is not followed by psychiatry. She is not currently involved in either individual or group therapy.    She is followed by Sports Medicine, Dr. Swanson, for chronic pain of left ankle. Last seen 01/09/25. She is receiving cortisone injections.    She is followed annually by Dr. Campbell, for lobular breast cancer. Last seen 12/13/24. Mammogram up to date.    She is reporting toenail fungus on bilateral feet and would like medication to address it.    Assessment/Plan      Diagnoses and all orders for this visit:    Encounter for Medicare annual wellness exam        -     Routine and preventative care provided and discussed with patient    Primary hypertension  -     Borderline control  -     Encouraged stricter low sodium diet  -     CBC and Auto Differential; Future  -     Comprehensive Metabolic Panel; Future  -     Lipid Panel; Future  -     atenolol (Tenormin) 50 mg tablet; Take 1 tablet (50 mg) by mouth once daily.  -     losartan-hydrochlorothiazide (Hyzaar) 100-12.5 mg tablet; Take 1 tablet by mouth once daily.    Mixed hyperlipidemia  -     Advised to resume Statin due to Cholesterol elevations and ASCVD Risk of 26.5%  -     Comprehensive Metabolic Panel; Future  -     Lipid Panel; Future  -     pravastatin (Pravachol) 40 mg tablet; Take 1 tablet (40 mg) by mouth once daily.    Anxiety        -     Stable  on low dose SSRI        -     sertraline 25 mg daily    Vitamin D deficiency         -     Continue daily over the counter Vitamin D supplement    Lobular breast cancer, left         -     Continue established follow up with Oncology    Chronic pain of left ankle        -     Continue established follow up with Sports Medicine    Onychomycosis  -     terbinafine (LamISIL) 250 mg tablet; Take 1 tablet (250 mg) by mouth once daily.    Other orders  -     Follow Up In Primary Care - Medicare Annual; Future    ADVANCED CARE PLANNING  Advanced Care Planning was discussed with patient:  The patient has an active surrogate decision-maker on file. The patient does not have an advanced care plan on file.  Encouraged the patient to confirm that Living Will and Healthcare Power of  (HCPoA) are accurate and up to date.  Encouraged the patient to confirm that our office be provided a copy of any documentation in the event that anything changes.    ACTIVITIES OF DAILY LIVING  Basic ADLs:  Bathing: Independent, Dressing: Independent, Toileting: Independent, Transferring: Independent, Continence: Independent, Feeding: Independent.    Instrumental ADLs:  Ability to use phone: Independent, Shopping: Independent, Cooking: Independent, House-keeping: Independent, Laundry: Independent, Transportation: Independent, Medication Management: Independent, Finance Management: Independent.    Subjective   HPI  Review of Systems   Constitutional:  Negative for chills, diaphoresis, fatigue and fever.   HENT:  Negative for hearing loss and mouth sores.    Eyes:  Negative for visual disturbance.   Respiratory:  Negative for cough, chest tightness and shortness of breath.    Cardiovascular:  Negative for chest pain, palpitations and leg swelling.   Gastrointestinal:  Negative for abdominal pain, blood in stool, nausea and vomiting.   Endocrine: Negative for cold intolerance, heat intolerance, polydipsia, polyphagia and polyuria.    Genitourinary:  Negative for dysuria, flank pain and hematuria.   Musculoskeletal:  Positive for arthralgias (left ankle). Negative for back pain and neck pain.   Skin:  Negative for rash and wound.   Allergic/Immunologic: Negative for environmental allergies, food allergies and immunocompromised state.   Neurological:  Negative for dizziness, seizures, syncope, facial asymmetry, speech difficulty and headaches.   Hematological:  Negative for adenopathy. Does not bruise/bleed easily.   Psychiatric/Behavioral:  Negative for agitation.      Objective   Vitals:    05/06/25 0754   BP: 144/76   Pulse: 65   Temp: 35.6 °C (96.1 °F)   SpO2: 97%      Body mass index is 29.8 kg/m².  Physical Exam  Vitals and nursing note reviewed.   Constitutional:       General: She is not in acute distress.     Appearance: Normal appearance. She is not ill-appearing.   HENT:      Head: Normocephalic and atraumatic.      Right Ear: Tympanic membrane, ear canal and external ear normal. There is no impacted cerumen.      Left Ear: Tympanic membrane, ear canal and external ear normal. There is no impacted cerumen.      Nose: Nose normal.      Mouth/Throat:      Mouth: Mucous membranes are moist.      Pharynx: Oropharynx is clear. No oropharyngeal exudate or posterior oropharyngeal erythema.   Eyes:      General: No scleral icterus.        Right eye: No discharge.         Left eye: No discharge.      Extraocular Movements: Extraocular movements intact.      Conjunctiva/sclera: Conjunctivae normal.      Pupils: Pupils are equal, round, and reactive to light.   Neck:      Vascular: No carotid bruit.   Cardiovascular:      Rate and Rhythm: Normal rate and regular rhythm.      Pulses: Normal pulses.      Heart sounds: Normal heart sounds. No murmur heard.  Pulmonary:      Effort: Pulmonary effort is normal. No respiratory distress.      Breath sounds: Normal breath sounds.   Abdominal:      General: Abdomen is flat. Bowel sounds are normal. There  "is no distension.      Palpations: Abdomen is soft. There is no mass.      Tenderness: There is no abdominal tenderness. There is no right CVA tenderness or left CVA tenderness.   Musculoskeletal:         General: No tenderness. Normal range of motion.      Cervical back: No tenderness.      Right lower leg: No edema.      Left lower leg: No edema.   Lymphadenopathy:      Cervical: No cervical adenopathy.   Skin:     General: Skin is warm and dry.      Coloration: Skin is not jaundiced.      Findings: No rash.   Neurological:      General: No focal deficit present.      Mental Status: She is alert and oriented to person, place, and time. Mental status is at baseline.   Psychiatric:         Mood and Affect: Mood normal.         Behavior: Behavior normal.       Diagnostic Results   Lab Results   Component Value Date    GLUCOSE 104 (H) 05/02/2025    CALCIUM 10.0 05/02/2025     05/02/2025    K 4.8 05/02/2025    CO2 24 05/02/2025     05/02/2025    BUN 21 05/02/2025    CREATININE 0.90 05/02/2025     Lab Results   Component Value Date    ALT 15 05/02/2025    AST 16 05/02/2025    ALKPHOS 83 05/02/2025    BILITOT 0.5 05/02/2025     Lab Results   Component Value Date    WBC 6.4 07/08/2024    HGB 12.9 07/08/2024    HCT 39.6 07/08/2024    MCV 99 07/08/2024     07/08/2024     Lab Results   Component Value Date    CHOL 270 (H) 05/02/2025    CHOL 205 (H) 07/08/2024    CHOL 194 12/11/2023     Lab Results   Component Value Date    HDL 53 05/02/2025    HDL 61.0 07/08/2024    HDL 57.0 12/11/2023     Lab Results   Component Value Date    LDLCALC 181 (H) 05/02/2025    LDLCALC 108 07/08/2024    LDLCALC 97 12/11/2023     Lab Results   Component Value Date    TRIG 200 (H) 05/02/2025    TRIG 181 (H) 07/08/2024    TRIG 202 (H) 12/11/2023     No components found for: \"CHOLHDL\"  Lab Results   Component Value Date    HGBA1C 6.2 (H) 05/02/2025     Other labs not included in the list above reviewed either before or during this " encounter.    History   Medical History[1]  Surgical History[2]  Family History[3]  Social History     Socioeconomic History    Marital status:      Spouse name: Not on file    Number of children: Not on file    Years of education: Not on file    Highest education level: Not on file   Occupational History    Not on file   Tobacco Use    Smoking status: Never     Passive exposure: Never    Smokeless tobacco: Never   Vaping Use    Vaping status: Never Used   Substance and Sexual Activity    Alcohol use: Yes     Alcohol/week: 3.0 standard drinks of alcohol     Comment: small glass with ice    Drug use: Never    Sexual activity: Not Currently   Other Topics Concern    Not on file   Social History Narrative    Not on file     Social Drivers of Health     Financial Resource Strain: Low Risk  (5/21/2024)    Overall Financial Resource Strain (CARDIA)     Difficulty of Paying Living Expenses: Not hard at all   Food Insecurity: Not on file   Transportation Needs: No Transportation Needs (6/6/2024)    OASIS : Transportation     Lack of Transportation (Medical): No     Lack of Transportation (Non-Medical): No     Patient Unable or Declines to Respond: No   Physical Activity: Not on file   Stress: Not on file   Social Connections: Feeling Socially Integrated (6/6/2024)    OASIS : Social Isolation     Frequency of experiencing loneliness or isolation: Never   Intimate Partner Violence: Not on file   Housing Stability: Low Risk  (5/21/2024)    Housing Stability Vital Sign     Unable to Pay for Housing in the Last Year: No     Number of Places Lived in the Last Year: 1     Unstable Housing in the Last Year: No     Allergies[4]  Medications Ordered Prior to Encounter[5]  Immunization History   Administered Date(s) Administered    Pneumococcal conjugate vaccine, 13-valent (PREVNAR 13) 10/18/2019    Pneumococcal polysaccharide vaccine, 23-valent, age 2 years and older (PNEUMOVAX 23) 10/27/2014    Tdap vaccine, age 7  year and older (BOOSTRIX, ADACEL) 08/01/2009, 10/18/2019    Zoster, live 04/22/2011     Patient's medical history was reviewed and updated either before or during this encounter.     Kim Martinez, ANTOINETTE-CNP       [1]   Past Medical History:  Diagnosis Date    Anxiety 09/05/2023    Cataract 2023    Dizziness 12/29/2023    Only upon getting up from laying down sometimes.    Elevated cholesterol 09/05/2023    Essential hypertension 09/05/2023    IFG (impaired fasting glucose) 09/05/2023    Meningioma (Multi) 09/05/2023 5/23 w/wo menigioma No issues  neurosurgery. craniovertebarl junction    Neuropathy 09/05/2023    R>L TSH,B12, SPEP M protein? Monoclonal anaylsis neg. 5/22.    Personal history of malignant neoplasm of breast 09/05/2023    Left XRT, Letrizole 11/17 ed    Primary osteoarthritis involving multiple joints 12/29/2023     for left knee DJD.    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   [2]   Past Surgical History:  Procedure Laterality Date    BREAST LUMPECTOMY  2017    Had radiation treatment.    JOINT REPLACEMENT Left 05/21/2024    TLKR   [3]   Family History  Problem Relation Name Age of Onset    Heart disease Mother Eunice Denis     Hypertension Mother Eunice Denis     Cancer Father Layo Denis    [4]   Allergies  Allergen Reactions    Penicillins Other     Difficulty breathing    [5]   Current Outpatient Medications on File Prior to Visit   Medication Sig Dispense Refill    atenolol (Tenormin) 50 mg tablet Take 1 tablet (50 mg) by mouth once daily. 90 tablet 2    calcium-vitamin D3-vitamin K (Viactiv) 500-500-40 mg-unit-mcg chewable tablet Chew 1 tablet once daily. With a meal      letrozole (Femara) 2.5 mg tablet Take 1 tablet (2.5 mg total) by mouth once daily.      losartan-hydrochlorothiazide (Hyzaar) 100-12.5 mg tablet Take 1 tablet by mouth once daily. 90 tablet 0    multivitamin tablet Take 1 tablet by  mouth once daily.      NON FORMULARY Take by mouth once daily. Juice Plus Fibre ..      omega 3-dha-epa-fish oil (Fish OiL) 1,000 mg (120 mg-180 mg) capsule Take 1 capsule (1,000 mg) by mouth once daily.      sertraline (Zoloft) 25 mg tablet Take 1 tablet (25 mg) by mouth once daily. 90 tablet 2    turmeric root extract 500 mg tablet Take 1 tablet by mouth 2 times a day.      UNABLE TO FIND WEEM WITH BIOTIN 1 CHEWABLE 2 TIMES A DAY      pravastatin (Pravachol) 40 mg tablet TAKE 1 TABLET BY MOUTH EVERY DAY (Patient not taking: Reported on 5/6/2025) 90 tablet 2    [DISCONTINUED] losartan-hydrochlorothiazide (Hyzaar) 100-12.5 mg tablet Take 1 tablet by mouth once daily. 90 tablet 2     No current facility-administered medications on file prior to visit.

## 2025-06-25 NOTE — PROGRESS NOTES
Verbal consent of the patient and/or verbal parental consent for patients under the age of 18 have been obtained to conduct a physical examination at this office visit.    The nurse Ev was present in the room during the entire visit including, but not limited to the physical examination.    Established patient    History Of Present Illness  06/27/25 Leanne Mims is a 74 y.o. female who presents for an evaluation of their Right knee. States that she has been having 5/10 pain in the right knee. States that she will have her knee give out multiple time per day sometimes and has been ongoing for over a year. She has had the left knee replaced 1 year ago by Dr. Thorpe but wishes to avoid surgery on the RIGHT. She feels most of her pain in the posterior knee and the patellofemoral aspect of the right knee. Mamabear oasis topical cream is what she uses with tylenol for pain management.  She avoids NSAIDs due to her medical history.  She has been doing exercises that she learned in physical therapy. States that she feels a grinding and clicks and pops sensations in the patella and medial joint line.    All previous Progress Notes and imaging results related to this patients chief complaint have been reviewed in preparation for this examination.    Past Medical History  She has a past medical history of Anxiety (09/05/2023), Cataract (2023), Hypertension, IFG (impaired fasting glucose) (09/05/2023), Meningioma (Multi) (09/05/2023), Neuropathy (09/05/2023), Personal history of malignant neoplasm of breast (09/05/2023), and Primary osteoarthritis involving multiple joints (12/29/2023).    Surgical History  She has a past surgical history that includes Breast lumpectomy (2017) and Joint replacement (Left, 05/21/2024).  Knee replacement by Dr. Thorpe     Social History  She reports that she has never smoked. She has never been exposed to tobacco smoke. She has never used smokeless tobacco. She reports current alcohol use of  about 3.0 standard drinks of alcohol per week. She reports that she does not use drugs.    Family History  Family History[1]     Allergies  Penicillins    Historical Clinical Intake    Review of Systems  CONSTITUTIONAL:   Negative for weight change, loss of appetite, fatigue, weakness, fever, chills, night sweats, headaches .           HEENT:   Negative for cold, cough, sore throat, sinus pain, swollen lymph nodes.           OPHTHALMOLOGY:   Negative for diminished vision, blurred vision, loss of vision, double vision.           ALLERGY:   Negative for runny nose, scratchy throat, sinus congestion, rash, facial pressure, nasal congestion, post-nasal drip.           CARDIOLOGY:   Negative for chest pain, palpitations, murmurs, irregular heart beat, shortness of breath, leg edema, dyspnea on exertion, fatigue, dizziness.           RESPIRATORY:   Negative for chest pain, shortness of breath, swelling of the legs, asthma/copd, chest congestion, pain with breathing .           GASTROENTEROLOGY:   Negative for nausea, vomitting, heartburn, constipation, diarrhea, blood in stool, change in bowel habits, black stool.           HEMATOLOGY/LYMPH:   Negative for fatigue, loss of appetitie, easy bruising, easy bleeding, anemia, abnormal bleeding, slow healing.           ENDOCRINOLOGY:   Negative for polyuria, polydipsia, polyphagia, fatigue, weight loss, weight gain, cold intolerance, heat intolerance, diabetes.           MUSCULOSKELETAL:   Positive  for Right knee pain        DERMATOLOGY:   Negative for rash, bruising.           NEUROLOGY:   Negative for tingling, numbness, gait abnormality, paresthesias, weakness, sciatica.        Examination:  Right knee  Edema: Positive medially  Effusion: Negative.   Percussion Test:   Negative.   Tuning Fork Test:  Negative.   Ecchymosis/Bruising: Negative.            Palpation:   Positive  Right  knee, palpable baker's cyst and medial meniscus    Orientation: Positive  Asymmetrical:  because of the swelling, varicose veins     Range of Motion:  Positive decreased  Positive Knee Flexion Left 48 Right 60  Positive Knee Extension  Left 180 Right 170           Muscle Strength:    Positive +5/+5 Quadricep Extension Causes pain  Positive +5/+5 Hamstring Flexion Causes pain  +5+5 Gastrocnemius  +5+5 Soleus.            Proprioception:   Pain with Squat: Positive    Pain with Sumo Squat:  Positive   Hop Test: Positive    Single leg balance test Positive            Vascular:   +2/+4 Femoral  +2/+4 Dorsalis Pedis  +2/+4 Posterior Tibial  Capillary Refill less than 2 seconds.                Knee - ACL:  Anterior Drawer Test:Negative  Lachman Test: Negative  Lelli Test: Negative                KNEE - PCL/POSTERIOR LATERAL CORNER:  Posterior Drawer Test: Negative  Dial Test: Negative    Reverse Lachman Test: Negative     KNEE - POPLITEUS:  Harvey's Test: Negative    KNEE - MCL / LCL:  Valgus Stress Test: 90 degrees: Negative 20-30 degrees: Negative  Varus Stress Test:  90 degrees: Negative, 20-30 degrees: Negative.   Apley Distraction Test: Positive  Medial.   Thessaly Test: Positive  medial meniscus and MCL        KNEE - MENISCUS:  Apley Compression Test:  Positive medial       Stienmann Test:  Positive   Forrest Test:  Positive medial  Bounce Home Test:  Positive  Thessaly Test:  Positive medial meniscus and MCL     KNEE - PATELLA:  Apprehension Test:Positive  Glide Test:  Negative  Grind Test: Positive  Patella Tracking Test: Positive  Fat pad Impingement: Negative             KNEE - QUADRICEPS:         VMO Test: Positive   VLO Test:  Negative    Hip/Pelvis - Sacrum:  Leg Length Supine: Positive RIGHT leg shorter than the Left, during Physical Examination, and Verified with standing erect pelvis xray   Leg Length Supine to Seated (Derbolowsky Sign): Positive RIGHT leg shorter than the Left, during Physical Examination, and Verified with standing erect pelvis xray        Feet/Foot: Positive Right Valgus  foot      Imaging and Diagnostics Review:       Assessment   1. Acute meniscal injury of knee, right, initial encounter        2. Patellofemoral pain syndrome of right knee        3. Leg length difference, acquired        4. Baker's cyst of knee, right        5. Patellar tracking disorder, right        6. Varicose veins of bilateral lower extremities with other complications        7. Venous insufficiency of both lower extremities        8. Chronic pain of right knee        9. Primary osteoarthritis of right knee        10. Valgus foot, right        11. Injury of medial collateral ligament of right knee            Treatment or Intervention:  May continue to alternate ice and moist heat as needed  Reviewed medial meniscus, degenerative joint disease of the knees, patellofemoral tracking syndrome, and/or patellar tendinitis in detail with the patient to the level of their understanding;  at the time of this office visit.  Start into Physical Therapy 1-2 times a week for 8-10 weeks with manual therapy as well as dry needling and IASTM  Reviewed home exercises to be performed by the patient routinely  Stressed the importance of wearing shoes with good stability control to help with the biomechanics affecting the knees and lower extremities  Stressed the importance of wearing full foot insoles to help with the biomechanics affecting the knees and lower extremities  Recommendation over-the-counter  vitamin-D 2 -3000+ milligrams a day, as well as a daily multivitamin.   Recommendation over-the-counter curcumin, turmeric, boswellia, as well as egg shell membrane as directed to aid with joint inflammation.  Recommendation over-the-counter Move Free for joint health.  Patient advised regarding the risks and/or potential adverse reactions and/or side effects of any prescribed medications along with any over-the-counter medications or any supplements used. Patient advised to seek immediate medical care if any adverse reactions  occur. The patient and/or patient(s) parent(s) verbalized their understanding  Referral to Vascular Medicine Dr. Ledezma for varicose veins and venous insufficiency  MRI of the right knee to rule out ligament or tendon injury for stress fracture plus further evaluate extent of degenerative changes  You have been ordered an MRI of the right knee. Once you contact scheduling at [phone] and obtain the date and time of your MRI, contact our office at [phone] to schedule your follow-up appointment to review your results. Please note the results of your imaging will not be discussed over the telephone.   Discussed in detail with the patient to the level of their understanding the possibility in the future of regenerative injections versus corticosteroid injections versus viscosupplementation injections versus a combination  Follow-up after RIGHT knee MRI or in 2-4 weeks for a reevaluation and possible xray or sooner as needed          HIRA PETIT on 6/27/25 at 8:33 AM.     Please note that this report has been produced using speech recognition software.  It may contain errors related to grammar, punctuation or spelling.  Electronically signed, but not reviewed.      Marshall Swanson D.O. FAOASM         [1]   Family History  Problem Relation Name Age of Onset    Heart disease Mother Eunice Denis     Hypertension Mother Eunice Denis     Cancer Father Layo Denis

## 2025-06-26 ENCOUNTER — TELEPHONE (OUTPATIENT)
Dept: PRIMARY CARE | Facility: CLINIC | Age: 75
End: 2025-06-26
Payer: MEDICARE

## 2025-06-26 DIAGNOSIS — F41.9 ANXIETY: ICD-10-CM

## 2025-06-26 RX ORDER — SERTRALINE HYDROCHLORIDE 25 MG/1
25 TABLET, FILM COATED ORAL DAILY
Qty: 90 TABLET | Refills: 2 | Status: SHIPPED | OUTPATIENT
Start: 2025-06-26 | End: 2026-06-26

## 2025-06-27 ENCOUNTER — HOSPITAL ENCOUNTER (OUTPATIENT)
Dept: RADIOLOGY | Facility: CLINIC | Age: 75
Discharge: HOME | End: 2025-06-27
Payer: MEDICARE

## 2025-06-27 ENCOUNTER — OFFICE VISIT (OUTPATIENT)
Dept: ORTHOPEDIC SURGERY | Facility: CLINIC | Age: 75
End: 2025-06-27
Payer: MEDICARE

## 2025-06-27 VITALS
HEIGHT: 68 IN | BODY MASS INDEX: 29.7 KG/M2 | SYSTOLIC BLOOD PRESSURE: 132 MMHG | HEART RATE: 82 BPM | DIASTOLIC BLOOD PRESSURE: 76 MMHG | WEIGHT: 196 LBS

## 2025-06-27 DIAGNOSIS — M17.11 PRIMARY OSTEOARTHRITIS OF RIGHT KNEE: ICD-10-CM

## 2025-06-27 DIAGNOSIS — S89.91XA INJURY OF MEDIAL COLLATERAL LIGAMENT OF RIGHT KNEE: ICD-10-CM

## 2025-06-27 DIAGNOSIS — M22.8X1 PATELLAR TRACKING DISORDER, RIGHT: ICD-10-CM

## 2025-06-27 DIAGNOSIS — S83.8X1A ACUTE MENISCAL INJURY OF KNEE, RIGHT, INITIAL ENCOUNTER: ICD-10-CM

## 2025-06-27 DIAGNOSIS — M71.21 BAKER'S CYST OF KNEE, RIGHT: ICD-10-CM

## 2025-06-27 DIAGNOSIS — M22.2X1 PATELLOFEMORAL PAIN SYNDROME OF RIGHT KNEE: ICD-10-CM

## 2025-06-27 DIAGNOSIS — I87.2 VENOUS INSUFFICIENCY OF BOTH LOWER EXTREMITIES: ICD-10-CM

## 2025-06-27 DIAGNOSIS — M21.071 VALGUS FOOT, RIGHT: ICD-10-CM

## 2025-06-27 DIAGNOSIS — M25.561 CHRONIC PAIN OF RIGHT KNEE: ICD-10-CM

## 2025-06-27 DIAGNOSIS — M79.671 BILATERAL FOOT PAIN: ICD-10-CM

## 2025-06-27 DIAGNOSIS — G89.29 CHRONIC PAIN OF RIGHT KNEE: ICD-10-CM

## 2025-06-27 DIAGNOSIS — M79.672 BILATERAL FOOT PAIN: ICD-10-CM

## 2025-06-27 DIAGNOSIS — M21.70 LEG LENGTH DIFFERENCE, ACQUIRED: ICD-10-CM

## 2025-06-27 DIAGNOSIS — I83.893 VARICOSE VEINS OF BILATERAL LOWER EXTREMITIES WITH OTHER COMPLICATIONS: ICD-10-CM

## 2025-06-27 PROCEDURE — 1159F MED LIST DOCD IN RCRD: CPT | Performed by: FAMILY MEDICINE

## 2025-06-27 PROCEDURE — 73564 X-RAY EXAM KNEE 4 OR MORE: CPT | Mod: RIGHT SIDE | Performed by: RADIOLOGY

## 2025-06-27 PROCEDURE — 73564 X-RAY EXAM KNEE 4 OR MORE: CPT | Mod: RT

## 2025-06-27 PROCEDURE — 3075F SYST BP GE 130 - 139MM HG: CPT | Performed by: FAMILY MEDICINE

## 2025-06-27 PROCEDURE — 1036F TOBACCO NON-USER: CPT | Performed by: FAMILY MEDICINE

## 2025-06-27 PROCEDURE — 3078F DIAST BP <80 MM HG: CPT | Performed by: FAMILY MEDICINE

## 2025-06-27 PROCEDURE — 99213 OFFICE O/P EST LOW 20 MIN: CPT | Performed by: FAMILY MEDICINE

## 2025-06-27 PROCEDURE — 99214 OFFICE O/P EST MOD 30 MIN: CPT | Performed by: FAMILY MEDICINE

## 2025-06-27 ASSESSMENT — PAIN - FUNCTIONAL ASSESSMENT: PAIN_FUNCTIONAL_ASSESSMENT: 0-10

## 2025-06-27 ASSESSMENT — ENCOUNTER SYMPTOMS
DEPRESSION: 0
LOSS OF SENSATION IN FEET: 0
OCCASIONAL FEELINGS OF UNSTEADINESS: 0

## 2025-06-27 ASSESSMENT — PAIN SCALES - GENERAL
PAINLEVEL_OUTOF10: 5
PAINLEVEL_OUTOF10: 5 - MODERATE PAIN

## 2025-06-27 ASSESSMENT — PAIN DESCRIPTION - DESCRIPTORS: DESCRIPTORS: ACHING;SORE

## 2025-06-27 NOTE — PATIENT INSTRUCTIONS
May continue to alternate ice and moist heat as needed  Reviewed handout degenerative joint disease of the knees, patellofemoral tracking syndrome, and/or patellar tendinitis in detail with the patient to the level of their understanding; a copy of this handout was provided to the patient at the time of this office visit.  Start into Physical Therapy 1-2 times a week for 8-10 weeks with manual therapy as well as dry needling and IASTM  Reviewed home exercises to be performed by the patient routinely  Stressed the importance of wearing shoes with good stability control to help with the biomechanics affecting the knees and lower extremities  Stressed the importance of wearing full foot insoles to help with the biomechanics affecting the knees and lower extremities  Recommendation over-the-counter calcium with vitamin-D 2 -3000+ milligrams a day, as well as OTC symphytum as directed daily to promote bony healing, in addition to a daily multivitamin.   Recommendation over-the-counter curcumin, turmeric, boswellia, as well as egg shell membrane as directed to aid with joint inflammation.  Recommendation over-the-counter Move Free for joint health.  Patient advised regarding the risks and/or potential adverse reactions and/or side effects of any prescribed medications along with any over-the-counter medications or any supplements used. Patient advised to seek immediate medical care if any adverse reactions occur. The patient and/or patient(s) parent(s) verbalized their understanding  Referral to Vascular Medicine Dr. Ledezma  You have been ordered an MRI of the right knee. Once you contact scheduling at [phone] and obtain the date and time of your MRI, contact our office at [phone] to schedule your follow-up appointment to review your results. Please note the results of your imaging will not be discussed over the telephone.   Discussed in detail with the patient to the level of their understanding the  possibility in the future of regenerative injections versus corticosteroid injections versus viscosupplementation injections versus a combination  Follow-up after RIGHT knee MRI or in 2-4 weeks for a reevaluation and possible xray or sooner as needed

## 2025-07-02 ENCOUNTER — HOSPITAL ENCOUNTER (OUTPATIENT)
Dept: RADIOLOGY | Facility: CLINIC | Age: 75
Discharge: HOME | End: 2025-07-02
Payer: MEDICARE

## 2025-07-02 DIAGNOSIS — M22.8X1 PATELLAR TRACKING DISORDER, RIGHT: ICD-10-CM

## 2025-07-02 DIAGNOSIS — M25.561 CHRONIC PAIN OF RIGHT KNEE: ICD-10-CM

## 2025-07-02 DIAGNOSIS — S83.8X1A ACUTE MENISCAL INJURY OF KNEE, RIGHT, INITIAL ENCOUNTER: ICD-10-CM

## 2025-07-02 DIAGNOSIS — M71.21 BAKER'S CYST OF KNEE, RIGHT: ICD-10-CM

## 2025-07-02 DIAGNOSIS — G89.29 CHRONIC PAIN OF RIGHT KNEE: ICD-10-CM

## 2025-07-02 DIAGNOSIS — M17.11 PRIMARY OSTEOARTHRITIS OF RIGHT KNEE: ICD-10-CM

## 2025-07-02 DIAGNOSIS — M21.071 VALGUS FOOT, RIGHT: ICD-10-CM

## 2025-07-02 DIAGNOSIS — M21.70 LEG LENGTH DIFFERENCE, ACQUIRED: ICD-10-CM

## 2025-07-02 DIAGNOSIS — M22.2X1 PATELLOFEMORAL PAIN SYNDROME OF RIGHT KNEE: ICD-10-CM

## 2025-07-02 DIAGNOSIS — S89.91XA INJURY OF MEDIAL COLLATERAL LIGAMENT OF RIGHT KNEE: ICD-10-CM

## 2025-07-02 PROCEDURE — 73721 MRI JNT OF LWR EXTRE W/O DYE: CPT | Mod: RIGHT SIDE | Performed by: RADIOLOGY

## 2025-07-02 PROCEDURE — 73721 MRI JNT OF LWR EXTRE W/O DYE: CPT | Mod: RT

## 2025-07-09 NOTE — PROGRESS NOTES
"Verbal consent of the patient and/or verbal parental consent for patients under the age of 18 have been obtained to conduct a physical examination at this office visit.    The nurse Ev was present in the room during the entire visit including, but not limited to the physical examination.    Established patient    History Of Present Illness  07/11/25 Leanne Mims is a 74 y.o. female who presents for MRI review of their Right knee. Scheduled to start PT on 8/8, reports she is not having any knee pain today, can increase to a 6/10 with activities. States she takes her time going up and down stairs, feels it \"gives out\" occasionally.   We talked in detail about her MRI of her knee.  I told her that I feel that she needs a knee replacement however she does not want to get surgery done at least till the spring of next year.  She cannot recall what she would have done to completely tear her ACL but she has been having more instability lately.  She is interested in doing viscosupplementation injections and regenerative injections.  I told her that I do not think the regenerative injections would be worthwhile based off of what her MRI shows.  Will get approval for the gel shots and down the line when she is ready for her knee replacement she would like to go back to see Dr. Thorpe since she had good results with him previously.    All previous Progress Notes and imaging results related to this patients chief complaint have been reviewed in preparation for this examination.    Past Medical History  She has a past medical history of Anxiety (09/05/2023), Cataract (2023), Hypertension, IFG (impaired fasting glucose) (09/05/2023), Meningioma (Multi) (09/05/2023), Neuropathy (09/05/2023), Personal history of malignant neoplasm of breast (09/05/2023), and Primary osteoarthritis involving multiple joints (12/29/2023).    Surgical History  She has a past surgical history that includes Breast lumpectomy (2017) and Joint " replacement (Left, 05/21/2024).  Knee replacement by Dr. Thorpe     Social History  She reports that she has never smoked. She has never been exposed to tobacco smoke. She has never used smokeless tobacco. She reports current alcohol use of about 3.0 standard drinks of alcohol per week. She reports that she does not use drugs.    Family History  Family History[1]     Allergies  Penicillins    Historical Clinical Intake  06/27/25 Leanne Mims is a 74 y.o. female who presents for an evaluation of their Right knee. States that she has been having 5/10 pain in the right knee. States that she will have her knee give out multiple time per day sometimes and has been ongoing for over a year. She has had the left knee replaced 1 year ago by Dr. Thorpe but wishes to avoid surgery on the RIGHT. She feels most of her pain in the posterior knee and the patellofemoral aspect of the right knee. Mamabear oasis topical cream is what she uses with tylenol for pain management.  She avoids NSAIDs due to her medical history.  She has been doing exercises that she learned in physical therapy. States that she feels a grinding and clicks and pops sensations in the patella and medial joint line.     Review of Systems  CONSTITUTIONAL:   Negative for weight change, loss of appetite, fatigue, weakness, fever, chills, night sweats, headaches .           HEENT:   Negative for cold, cough, sore throat, sinus pain, swollen lymph nodes.           OPHTHALMOLOGY:   Negative for diminished vision, blurred vision, loss of vision, double vision.           ALLERGY:   Negative for runny nose, scratchy throat, sinus congestion, rash, facial pressure, nasal congestion, post-nasal drip.           CARDIOLOGY:   Negative for chest pain, palpitations, murmurs, irregular heart beat, shortness of breath, leg edema, dyspnea on exertion, fatigue, dizziness.           RESPIRATORY:   Negative for chest pain, shortness of breath, swelling of the legs, asthma/copd,  chest congestion, pain with breathing .           GASTROENTEROLOGY:   Negative for nausea, vomitting, heartburn, constipation, diarrhea, blood in stool, change in bowel habits, black stool.           HEMATOLOGY/LYMPH:   Negative for fatigue, loss of appetitie, easy bruising, easy bleeding, anemia, abnormal bleeding, slow healing.           ENDOCRINOLOGY:   Negative for polyuria, polydipsia, polyphagia, fatigue, weight loss, weight gain, cold intolerance, heat intolerance, diabetes.           MUSCULOSKELETAL:   Positive  for Right knee pain        DERMATOLOGY:   Negative for rash, bruising.           NEUROLOGY:   Negative for tingling, numbness, gait abnormality, paresthesias, weakness, sciatica.        Examination: All findings slight improvement since last visit  Right knee  Edema: Positive medially  Effusion: Negative.   Percussion Test:   Negative.   Tuning Fork Test:  Negative.   Ecchymosis/Bruising: Negative.            Palpation: All findings slight improvement since last visit  Positive  Right  knee, palpable baker's cyst and medial meniscus    Orientation: All findings slight improvement since last visit  Positive  Asymmetrical: because of the swelling, varicose veins     Range of Motion:  All findings slight improvement since last visit  Positive decreased  Positive Knee Flexion Left 48 Right 60  Positive Knee Extension  Left 180 Right 170           Muscle Strength:  All findings slight improvement since last visit  Positive +5/+5 Quadricep Extension Causes pain  Positive +5/+5 Hamstring Flexion Causes pain  +5+5 Gastrocnemius  +5+5 Soleus.            Proprioception: All findings slight improvement since last visit  Pain with Squat: Positive    Pain with Sumo Squat:  Positive   Hop Test: Positive    Single leg balance test Positive            Vascular:   +2/+4 Femoral  +2/+4 Dorsalis Pedis  +2/+4 Posterior Tibial  Capillary Refill less than 2 seconds.                Knee - ACL:All findings slight  improvement since last visit  Anterior Drawer Test: Positive   Lachman Test: Positive   Lelli Test: Positive              KNEE - PCL/POSTERIOR LATERAL CORNER:  Posterior Drawer Test: Negative  Dial Test: Negative    Reverse Lachman Test: Negative     KNEE - POPLITEUS:  Harvey's Test: Negative    KNEE - MCL / LCL:All findings slight improvement since last visit  Valgus Stress Test: 90 degrees: Negative 20-30 degrees: Negative  Varus Stress Test:  90 degrees: Negative, 20-30 degrees: Negative.   Apley Distraction Test: Positive  Medial.   Thessaly Test: Positive  medial meniscus and MCL        KNEE - MENISCUS:All findings slight improvement since last visit  Apley Compression Test:  Positive medial       Stienmann Test:  Positive   Forrest Test:  Positive medial  Bounce Home Test:  Positive  Thessaly Test:  Positive medial meniscus and MCL     KNEE - PATELLA:All findings slight improvement since last visit  Apprehension Test: Positive  Glide Test:  Negative  Grind Test: Positive  Patella Tracking Test: Positive  Fat pad Impingement: Negative             KNEE - QUADRICEPS:    All findings slight improvement since last visit     VMO Test: Positive   VLO Test:  Negative    Hip/Pelvis - Sacrum:  Leg Length Supine: Positive RIGHT leg shorter than the Left, during Physical Examination, and Verified with standing erect pelvis xray   Leg Length Supine to Seated (Derbolowsky Sign): Positive RIGHT leg shorter than the Left, during Physical Examination, and Verified with standing erect pelvis xray        Feet/Foot: Positive Right Valgus foot      Imaging and Diagnostics Review:  STUDY: XR Right knee, four views. 6/28/2025       ORDERING CLINICIAN:  ASHLEY BARKER      Severe medial compartment osteoarthrosis with joint space loss and  osteophytes. Mild lateral and patellofemoral compartment  osteoarthrosis with osteophytes. Anterior translation of the tibia  suggesting ligamentous instability. Small knee joint effusion.  Soft  tissues are unremarkable.      IMPRESSION:  1. Severe medial and mild lateral/patellofemoral compartment  osteoarthrosis.      Signed by: Nelsy Gould 6/28/2025 7:35 AM  Dictation workstation:   [id number]    STUDY: MR KNEE RIGHT WO IV CONTRAST; ;  7/2/2025 8:02 am      ORDERING CLINICIAN:  ASHLEY BARKER      FINDINGS:  Anterior cruciate ligament is not seen in the intercondylar notch  with chronic ACL tear with resorption demonstrated. Secondary  anterior translation of the tibia in relation of the femur      Posterior cruciate ligament demonstrates mild buckling in relation to  chronic ACL tear. Mild intermediate signal intensity in the distal  PCL with chronic low-grade sprain      Lateral meniscus demonstrates mild degeneration. There is truncated  free edge of the anterior horn of the lateral meniscus. No  longitudinal tear demonstrated      Medial meniscus demonstrates macerated posterior horn of the medial  meniscus with peripheral extrusion of the body of the medial  meniscus. There is mild degeneration and blunting of the free edge of  the body with superimposed longitudinal horizontal tear of the  posterior body segment. Of note, there is a multilobulated  parameniscal cyst arising from the region of the posterior horn  medially measuring 2.7 x 2.6 cm in the AP and transverse dimension  respectively. This is not felt to represent a popliteal cyst.      Medial collateral ligament complex is bowed in relation to medial  femorotibial osteoarthritis      Lateral supporting structures are intact. Mild popliteus tendinosis  demonstrated. Mild fluid in the popliteus recess.      Extensor mechanism is intact. Patellofemoral articulation  demonstrates moderate thinning of the articular cartilage within the  medial facet/apex of the patella with areas of full-thickness  fissuring of the cartilage within the mid apex of the patella.  Trochlea demonstrates severe loss of articular cartilage within the  medial and  portions of the central trochlea with remodeling of the  subchondral bone plate. Osteophytosis along the medial trochlear  ridge.      Lateral femorotibial compartment demonstrates severe thinning of the  cartilage along the outer margin of the lateral femoral condyle with  osteophytosis demonstrated. Also, component of severe thinning along  the inner margin of the femoral condyle. Medial femorotibial  compartment demonstrates denuded articular cartilage within the  weight-bearing portion of the compartment with remodeling of the  subchondral bone plate and reactive marrow edema. Osteophytosis along  the outer margin of the compartment    Small knee joint effusion demonstrated. Component of intra-articular  debris/synovial hypertrophy in the medial gastrocnemius recess.       IMPRESSION MRI of the right knee July 2, 2025:  1. Tricompartmental osteoarthritis with severe medial femorotibial  and moderate patellofemoral and lateral femorotibial osteoarthritis    2. Macerated posterior horn of the medial meniscus with the extruded  body of the medial meniscus.   3. Parameniscal cyst formation arising from the region of the posterior horn near the body segment.    4. Small knee joint effusion   5. intra-articular bodies  6. Popliteus tendinosis  7. ACL chronic tear  8. PCL chronic sprain  9. Lateral meniscus degeneration and tear    Signed by: Anali Escobedo 7/2/2025 9:45 AM  Dictation workstation:   [id number]    Assessment   1. Other old tear of medial meniscus of right knee        2. Patellofemoral pain syndrome of right knee        3. Baker's cyst of knee, right        4. Patellar tracking disorder, right        5. Chronic pain of right knee        6. Primary osteoarthritis of right knee        7. Tricompartment osteoarthritis of right knee        8. Sprain of posterior cruciate ligament of knee, subsequent encounter        9. Degeneration of lateral meniscus of right knee        10. Tendinosis of right knee         11. Perimeniscal cyst of right knee        12. Degeneration of posterior horn of medial meniscus        13. Bodies, loose, joint, knee, right        14. Leg length difference, acquired        15. Old complete tear of anterior cruciate ligament of right knee        16. Sprain of posterior cruciate ligament of right knee, subsequent encounter        17. Degeneration of posterior horn of lateral meniscus        18. Loose body in knee, left              Treatment or Intervention:  May continue to alternate ice and moist heat as needed  Reviewed medial meniscus, degenerative joint disease of the knees, patellofemoral tracking syndrome, and/or patellar tendinitis in detail with the patient to the level of their understanding;  at the time of this office visit.  Start into Physical Therapy 1-2 times a week for 8-10 weeks with manual therapy as well as dry needling and IASTM  Reviewed home exercises to be performed by the patient routinely  Once again stressed the importance of wearing shoes with good stability control to help with the biomechanics affecting the knees and lower extremities  Once again stressed the importance of wearing full foot insoles to help with the biomechanics affecting the knees and lower extremities  Continue over-the-counter  vitamin-D 2 -3000+ milligrams a day, as well as a daily multivitamin.   Continue over-the-counter curcumin, turmeric, boswellia, as well as egg shell membrane as directed to aid with joint inflammation.  Continue over-the-counter Move Free for joint health.  Patient advised regarding the risks and/or potential adverse reactions and/or side effects of any prescribed medications along with any over-the-counter medications or any supplements used. Patient advised to seek immediate medical care if any adverse reactions occur. The patient and/or patient(s) parent(s) verbalized their understanding  Make sure to get into see vascular Medicine Dr. Ledezma for varicose veins and  venous insufficiency  Reviewed knee MRI in detail with the patient to their level of understanding; a copy of these results were provided to the patient at the time of this office visit.   Discussed with patient in detail that I do not think she is a good candidate for regenerative injections however we can get the viscosupplementation injections  He does not want to do a knee replacement this year but when she does she wants to get back into see Dr. Thorpe and the earliest she would like to do it would be in the spring especially since she is dealing with her cataract surgeries now and she just had her left knee replaced earlier this year  TREATMENT PLAN: Staff to initiate prior authorization process for viscosupplementation injections into the patients RIGHT knee under ultrasound. Staff will contact the patient to schedule injections once approval is received.  Referral to orthopaedic surgery but patient is against it at this time however in the spring we will refer her over to Dr. Thorpe.   Follow-up for viscosupplementation injection series      ASHLEY BARKER on 7/11/25 at 8:35 AM.     Please note that this report has been produced using speech recognition software.  It may contain errors related to grammar, punctuation or spelling.  Electronically signed, but not reviewed.      Ashley Barker D.O. FAOASM         [1]   Family History  Problem Relation Name Age of Onset    Heart disease Mother Eunice Montanarina     Hypertension Mother Eunice Deprince     Cancer Father Layo Montanarina

## 2025-07-11 ENCOUNTER — TELEPHONE (OUTPATIENT)
Dept: ORTHOPEDIC SURGERY | Facility: CLINIC | Age: 75
End: 2025-07-11

## 2025-07-11 ENCOUNTER — OFFICE VISIT (OUTPATIENT)
Dept: ORTHOPEDIC SURGERY | Facility: CLINIC | Age: 75
End: 2025-07-11
Payer: MEDICARE

## 2025-07-11 VITALS
BODY MASS INDEX: 28.34 KG/M2 | HEIGHT: 68 IN | WEIGHT: 187 LBS | SYSTOLIC BLOOD PRESSURE: 147 MMHG | HEART RATE: 64 BPM | DIASTOLIC BLOOD PRESSURE: 79 MMHG

## 2025-07-11 DIAGNOSIS — M25.561 CHRONIC PAIN OF RIGHT KNEE: ICD-10-CM

## 2025-07-11 DIAGNOSIS — M22.8X1 PATELLAR TRACKING DISORDER, RIGHT: ICD-10-CM

## 2025-07-11 DIAGNOSIS — M71.21 BAKER'S CYST OF KNEE, RIGHT: ICD-10-CM

## 2025-07-11 DIAGNOSIS — M23.300 DEGENERATION OF LATERAL MENISCUS OF RIGHT KNEE: ICD-10-CM

## 2025-07-11 DIAGNOSIS — M23.329 DEGENERATION OF POSTERIOR HORN OF MEDIAL MENISCUS: ICD-10-CM

## 2025-07-11 DIAGNOSIS — M17.11 TRICOMPARTMENT OSTEOARTHRITIS OF RIGHT KNEE: ICD-10-CM

## 2025-07-11 DIAGNOSIS — M23.41 BODIES, LOOSE, JOINT, KNEE, RIGHT: ICD-10-CM

## 2025-07-11 DIAGNOSIS — G89.29 CHRONIC PAIN OF RIGHT KNEE: ICD-10-CM

## 2025-07-11 DIAGNOSIS — M23.359 DEGENERATION OF POSTERIOR HORN OF LATERAL MENISCUS: ICD-10-CM

## 2025-07-11 DIAGNOSIS — M23.006 PERIMENISCAL CYST OF RIGHT KNEE: ICD-10-CM

## 2025-07-11 DIAGNOSIS — M67.863 TENDINOSIS OF RIGHT KNEE: ICD-10-CM

## 2025-07-11 DIAGNOSIS — S83.529D SPRAIN OF POSTERIOR CRUCIATE LIGAMENT OF KNEE, SUBSEQUENT ENCOUNTER: ICD-10-CM

## 2025-07-11 DIAGNOSIS — M21.70 LEG LENGTH DIFFERENCE, ACQUIRED: ICD-10-CM

## 2025-07-11 DIAGNOSIS — M23.42 LOOSE BODY IN KNEE, LEFT: ICD-10-CM

## 2025-07-11 DIAGNOSIS — M23.51 OLD COMPLETE TEAR OF ANTERIOR CRUCIATE LIGAMENT OF RIGHT KNEE: ICD-10-CM

## 2025-07-11 DIAGNOSIS — M17.11 PRIMARY OSTEOARTHRITIS OF RIGHT KNEE: ICD-10-CM

## 2025-07-11 DIAGNOSIS — M22.2X1 PATELLOFEMORAL PAIN SYNDROME OF RIGHT KNEE: ICD-10-CM

## 2025-07-11 DIAGNOSIS — S83.521D SPRAIN OF POSTERIOR CRUCIATE LIGAMENT OF RIGHT KNEE, SUBSEQUENT ENCOUNTER: ICD-10-CM

## 2025-07-11 DIAGNOSIS — M23.203 OTHER OLD TEAR OF MEDIAL MENISCUS OF RIGHT KNEE: Primary | ICD-10-CM

## 2025-07-11 PROBLEM — S83.241A TEAR OF MEDIAL MENISCUS OF RIGHT KNEE: Status: ACTIVE | Noted: 2025-07-11

## 2025-07-11 PROBLEM — S83.521A SPRAIN OF POSTERIOR CRUCIATE LIGAMENT OF RIGHT KNEE: Status: ACTIVE | Noted: 2025-07-11

## 2025-07-11 PROCEDURE — 1159F MED LIST DOCD IN RCRD: CPT | Performed by: FAMILY MEDICINE

## 2025-07-11 PROCEDURE — 1126F AMNT PAIN NOTED NONE PRSNT: CPT | Performed by: FAMILY MEDICINE

## 2025-07-11 PROCEDURE — 3077F SYST BP >= 140 MM HG: CPT | Performed by: FAMILY MEDICINE

## 2025-07-11 PROCEDURE — 99212 OFFICE O/P EST SF 10 MIN: CPT | Performed by: FAMILY MEDICINE

## 2025-07-11 PROCEDURE — 3078F DIAST BP <80 MM HG: CPT | Performed by: FAMILY MEDICINE

## 2025-07-11 PROCEDURE — 1036F TOBACCO NON-USER: CPT | Performed by: FAMILY MEDICINE

## 2025-07-11 PROCEDURE — 99214 OFFICE O/P EST MOD 30 MIN: CPT | Performed by: FAMILY MEDICINE

## 2025-07-11 RX ORDER — PREDNISOLONE ACETATE 10 MG/ML
SUSPENSION/ DROPS OPHTHALMIC
COMMUNITY
Start: 2025-07-09

## 2025-07-11 RX ORDER — CLINDAMYCIN HYDROCHLORIDE 300 MG/1
2 CAPSULE ORAL
COMMUNITY
Start: 2024-08-12

## 2025-07-11 ASSESSMENT — ENCOUNTER SYMPTOMS
LOSS OF SENSATION IN FEET: 0
OCCASIONAL FEELINGS OF UNSTEADINESS: 0
DEPRESSION: 0

## 2025-07-11 ASSESSMENT — COLUMBIA-SUICIDE SEVERITY RATING SCALE - C-SSRS
2. HAVE YOU ACTUALLY HAD ANY THOUGHTS OF KILLING YOURSELF?: NO
6. HAVE YOU EVER DONE ANYTHING, STARTED TO DO ANYTHING, OR PREPARED TO DO ANYTHING TO END YOUR LIFE?: NO
1. IN THE PAST MONTH, HAVE YOU WISHED YOU WERE DEAD OR WISHED YOU COULD GO TO SLEEP AND NOT WAKE UP?: NO

## 2025-07-11 ASSESSMENT — PAIN - FUNCTIONAL ASSESSMENT: PAIN_FUNCTIONAL_ASSESSMENT: NO/DENIES PAIN

## 2025-07-11 ASSESSMENT — PAIN SCALES - GENERAL: PAINLEVEL_OUTOF10: 0-NO PAIN

## 2025-07-11 NOTE — PATIENT INSTRUCTIONS
May continue to alternate ice and moist heat as needed  Reviewed medial meniscus, degenerative joint disease of the knees, patellofemoral tracking syndrome, and/or patellar tendinitis in detail with the patient to the level of their understanding;  at the time of this office visit.  Start into Physical Therapy 1-2 times a week for 8-10 weeks with manual therapy as well as dry needling and IASTM  Reviewed home exercises to be performed by the patient routinely  Stressed the importance of wearing shoes with good stability control to help with the biomechanics affecting the knees and lower extremities  Stressed the importance of wearing full foot insoles to help with the biomechanics affecting the knees and lower extremities  Recommendation over-the-counter  vitamin-D 2 -3000+ milligrams a day, as well as a daily multivitamin.   Recommendation over-the-counter curcumin, turmeric, boswellia, as well as egg shell membrane as directed to aid with joint inflammation.  Recommendation over-the-counter Move Free for joint health.  Patient advised regarding the risks and/or potential adverse reactions and/or side effects of any prescribed medications along with any over-the-counter medications or any supplements used. Patient advised to seek immediate medical care if any adverse reactions occur. The patient and/or patient(s) parent(s) verbalized their understanding  Referral to Vascular Medicine Dr. Ledezma for varicose veins and venous insufficiency  Reviewed knee MRI in detail with the patient to their level of understanding; a copy of these results were provided to the patient at the time of this office visit.   Discussed in detail with the patient to the level of their understanding the possibility in the future of regenerative injections versus corticosteroid injections versus viscosupplementation injections versus a combination  TREATMENT PLAN: Staff to initiate prior authorization process for viscosupplementation  injections into the patients RIGHT knee under ultrasound. Staff will contact the patient to schedule injections once approval is received.  Referral to orthopaedic surgery but patient is against it at this time.   Follow-up for injection series

## 2025-07-11 NOTE — TELEPHONE ENCOUNTER
07/11/2025- Per [id number]- PRIMARY- As per Medicare guidelines, a standard course of treatment for Supartz FX  is 3 to 5 injections per knee in a 6-month period. Additional injections may not be considered reasonable and necessary and may deny. For services to be approved there must be radiological evidence to support the diagnosis of osteoarthritis OF THE KNEE ONLY; and patient must have tried and failed with conservative forms of treatment. Plan may request Medical Notes upon receipt of claim. Follows all Medicare Guidelines. Treatment of joints other than the knee are pursuant to your local [facility] guidelines. Please refer to your local CMS website. Ref # [id number] 07/11/2025    SECONDARY-Active since 07/01/2014; Medicare supplement plan F. This plan covers the 20% Medicare coinsurance and Part B deductible. Plan follows all Medicare guidelines. No pre-cert, Medical notes or referrals needed. Ref #: IVR 07/11/2025     APPROVAL SCANNED INTO CHART

## 2025-07-11 NOTE — TELEPHONE ENCOUNTER
07/11/2025- Initiated benefits investigation via [id number] for Viscosupplementation Injections into the patients R.KNEE

## 2025-08-04 NOTE — PROGRESS NOTES
"Verbal consent of the patient and/or verbal parental consent for patients under the age of 18 have been obtained to conduct a physical examination at this office visit.    The nurse Ev was present in the room during the entire visit including, but not limited to the physical examination.    Established patient    History Of Present Illness  08/05/25 Leanne Mims is a 75 y.o. female who presents for Supartz #1 injection into their Right knee. Patient reports she feels knee \"clunking\" around, has not been wearing the brace consistently. Scheduled for pt evaluation this week. Rates pain in knee as 4/10 with activity.   She says she wants to do what ever it takes to stay away from getting a knee replacement.  She says she is very interested in doing stem cell injections down the line.  I told her I am not sure if she is a good candidate for the stem cell injections because of how bad her knee is however she says it does not matter if she still wants to try it down the line to see if she gets some results with it before she would pursue a knee replacement.  Additionally on top of that I told her she should get into see Dr. Ledezma for her varicose veins however she says that her neighbor is seeing her and is getting good results however she does not want to wear the thigh-high stockings for 3 months but she may consider it in the future.    All previous Progress Notes and imaging results related to this patients chief complaint have been reviewed in preparation for this examination.    Past Medical History  She has a past medical history of Anxiety (09/05/2023), Cataract (2023), Hypertension, IFG (impaired fasting glucose) (09/05/2023), Meningioma (Multi) (09/05/2023), Neuropathy (09/05/2023), Personal history of malignant neoplasm of breast (09/05/2023), and Primary osteoarthritis involving multiple joints (12/29/2023).    Surgical History  She has a past surgical history that includes Breast lumpectomy (2017) " "and Joint replacement (Left, 05/21/2024).  Knee replacement by Dr. Thorpe     Social History  She reports that she has never smoked. She has never been exposed to tobacco smoke. She has never used smokeless tobacco. She reports current alcohol use of about 3.0 standard drinks of alcohol per week. She reports that she does not use drugs.    Family History  Family History[1]     Allergies  Penicillins    Historical Clinical Intake  06/27/25 Leanne Mims is a 74 y.o. female who presents for an evaluation of their Right knee. States that she has been having 5/10 pain in the right knee. States that she will have her knee give out multiple time per day sometimes and has been ongoing for over a year. She has had the left knee replaced 1 year ago by Dr. Thorpe but wishes to avoid surgery on the RIGHT. She feels most of her pain in the posterior knee and the patellofemoral aspect of the right knee. Mamabear oasis topical cream is what she uses with tylenol for pain management.  She avoids NSAIDs due to her medical history.  She has been doing exercises that she learned in physical therapy. States that she feels a grinding and clicks and pops sensations in the patella and medial joint line.   ----------------------------------  07/11/25 Leanne Mims is a 74 y.o. female who presents for MRI review of their Right knee. Scheduled to start PT on 8/8, reports she is not having any knee pain today, can increase to a 6/10 with activities. States she takes her time going up and down stairs, feels it \"gives out\" occasionally.   We talked in detail about her MRI of her knee.  I told her that I feel that she needs a knee replacement however she does not want to get surgery done at least till the spring of next year.  She cannot recall what she would have done to completely tear her ACL but she has been having more instability lately.  She is interested in doing viscosupplementation injections and regenerative injections.  I told her " that I do not think the regenerative injections would be worthwhile based off of what her MRI shows.  Will get approval for the gel shots and down the line when she is ready for her knee replacement she would like to go back to see Dr. Thorpe since she had good results with him previously.     Review of Systems  CONSTITUTIONAL:   Negative for weight change, loss of appetite, fatigue, weakness, fever, chills, night sweats, headaches .           HEENT:   Negative for cold, cough, sore throat, sinus pain, swollen lymph nodes.           OPHTHALMOLOGY:   Negative for diminished vision, blurred vision, loss of vision, double vision.           ALLERGY:   Negative for runny nose, scratchy throat, sinus congestion, rash, facial pressure, nasal congestion, post-nasal drip.           CARDIOLOGY:   Negative for chest pain, palpitations, murmurs, irregular heart beat, shortness of breath, leg edema, dyspnea on exertion, fatigue, dizziness.           RESPIRATORY:   Negative for chest pain, shortness of breath, swelling of the legs, asthma/copd, chest congestion, pain with breathing .           GASTROENTEROLOGY:   Negative for nausea, vomitting, heartburn, constipation, diarrhea, blood in stool, change in bowel habits, black stool.           HEMATOLOGY/LYMPH:   Negative for fatigue, loss of appetitie, easy bruising, easy bleeding, anemia, abnormal bleeding, slow healing.           ENDOCRINOLOGY:   Negative for polyuria, polydipsia, polyphagia, fatigue, weight loss, weight gain, cold intolerance, heat intolerance, diabetes.           MUSCULOSKELETAL:   Positive  for Right knee pain        DERMATOLOGY:   Negative for rash, bruising.           NEUROLOGY:   Negative for tingling, numbness, gait abnormality, paresthesias, weakness, sciatica.        Examination: All findings continue improvement since last visit  Right knee  Edema: Negative  Effusion: Negative.   Percussion Test:   Negative.   Tuning Fork Test:  Negative.    Ecchymosis/Bruising: Negative.            Palpation: All findings continue improvement since last visit  Positive  Right  knee, slightly tender to palpation palpable baker's cyst and medial meniscus    Orientation: All findings continue improvement since last visit  Positive  Asymmetrical: because of the varicose veins     Range of Motion:  All findings continue improvement since last visit  Positive still slightly decreased  Positive Knee Flexion Left 48 Right 55  Positive Knee Extension  Left 180 Right 175           Muscle Strength:  All findings continue improvement since last visit  5/+5 Quadricep Extension no longer causes pain  +5/+5 Hamstring Flexion no longer causes pain   +5+5 Gastrocnemius  +5+5 Soleus.            Proprioception: All findings continue improvement since last visit  Pain with Squat: Positive    Pain with Sumo Squat:  Positive   Hop Test: Positive    Single leg balance test Positive            Vascular:   +2/+4 Femoral  +2/+4 Dorsalis Pedis  +2/+4 Posterior Tibial  Capillary Refill less than 2 seconds.                Knee - ACL: All findings continue improvement since last visit  Anterior Drawer Test: Positive   Lachman Test: Positive   Lelli Test: Positive              KNEE - PCL/POSTERIOR LATERAL CORNER:  Posterior Drawer Test: Negative  Dial Test: Negative    Reverse Lachman Test: Negative     KNEE - POPLITEUS:  Harvey's Test: Negative    KNEE - MCL / LCL: All findings continue improvement since last visit  Valgus Stress Test: 90 degrees: Negative 20-30 degrees: Negative  Varus Stress Test:  90 degrees: Negative, 20-30 degrees: Negative.   Apley Distraction Test: Positive  Medial.   Thessaly Test: Positive  medial meniscus and MCL        KNEE - MENISCUS: All findings continue improvement since last visit  Apley Compression Test:  Positive medial       Stienmann Test:  Positive   Forrest Test:  Positive medial  Bounce Home Test:  Positive  Thessaly Test:  Positive medial meniscus and  MCL     KNEE - PATELLA: All findings continue improvement since last visit  Apprehension Test: Positive  Glide Test:  Negative  Grind Test: Positive  Patella Tracking Test: Positive  Fat pad Impingement: Negative             KNEE - QUADRICEPS: All findings continue improvement since last visit    VMO Test: Positive   VLO Test:  Negative    Hip/Pelvis - Sacrum:  Leg Length Supine: Positive RIGHT leg shorter than the Left, during Physical Examination, and Verified with standing erect pelvis xray   Leg Length Supine to Seated (Derbolowsky Sign): Positive RIGHT leg shorter than the Left, during Physical Examination, and Verified with standing erect pelvis xray        Feet/Foot: Positive Right Valgus foot      Imaging and Diagnostics Review:  STUDY: XR Right knee, four views. 6/28/2025       ORDERING CLINICIAN:  ASHLEY BARKER      Severe medial compartment osteoarthrosis with joint space loss and  osteophytes. Mild lateral and patellofemoral compartment  osteoarthrosis with osteophytes. Anterior translation of the tibia  suggesting ligamentous instability. Small knee joint effusion.  Soft tissues are unremarkable.      IMPRESSION:  1. Severe medial and mild lateral/patellofemoral compartment  osteoarthrosis.      Signed by: Nelsy Gould 6/28/2025 7:35 AM  Dictation workstation:   [id number]    STUDY: MR KNEE RIGHT WO IV CONTRAST; ;  7/2/2025 8:02 am      ORDERING CLINICIAN:  ASHLEY BARKER      FINDINGS:  Anterior cruciate ligament is not seen in the intercondylar notch  with chronic ACL tear with resorption demonstrated. Secondary  anterior translation of the tibia in relation of the femur      Posterior cruciate ligament demonstrates mild buckling in relation to  chronic ACL tear. Mild intermediate signal intensity in the distal  PCL with chronic low-grade sprain      Lateral meniscus demonstrates mild degeneration. There is truncated  free edge of the anterior horn of the lateral meniscus. No  longitudinal tear  demonstrated      Medial meniscus demonstrates macerated posterior horn of the medial  meniscus with peripheral extrusion of the body of the medial  meniscus. There is mild degeneration and blunting of the free edge of  the body with superimposed longitudinal horizontal tear of the  posterior body segment. Of note, there is a multilobulated  parameniscal cyst arising from the region of the posterior horn  medially measuring 2.7 x 2.6 cm in the AP and transverse dimension  respectively. This is not felt to represent a popliteal cyst.      Medial collateral ligament complex is bowed in relation to medial  femorotibial osteoarthritis      Lateral supporting structures are intact. Mild popliteus tendinosis  demonstrated. Mild fluid in the popliteus recess.      Extensor mechanism is intact. Patellofemoral articulation  demonstrates moderate thinning of the articular cartilage within the  medial facet/apex of the patella with areas of full-thickness  fissuring of the cartilage within the mid apex of the patella.  Trochlea demonstrates severe loss of articular cartilage within the  medial and portions of the central trochlea with remodeling of the  subchondral bone plate. Osteophytosis along the medial trochlear  ridge.      Lateral femorotibial compartment demonstrates severe thinning of the  cartilage along the outer margin of the lateral femoral condyle with  osteophytosis demonstrated. Also, component of severe thinning along  the inner margin of the femoral condyle. Medial femorotibial  compartment demonstrates denuded articular cartilage within the  weight-bearing portion of the compartment with remodeling of the  subchondral bone plate and reactive marrow edema. Osteophytosis along  the outer margin of the compartment    Small knee joint effusion demonstrated. Component of intra-articular  debris/synovial hypertrophy in the medial gastrocnemius recess.       IMPRESSION MRI of the right knee July 2, 2025:  1.  Tricompartmental osteoarthritis with severe medial femorotibial  and moderate patellofemoral and lateral femorotibial osteoarthritis    2. Macerated posterior horn of the medial meniscus with the extruded  body of the medial meniscus.   3. Parameniscal cyst formation arising from the region of the posterior horn near the body segment.    4. Small knee joint effusion   5. intra-articular bodies  6. Popliteus tendinosis  7. ACL chronic tear  8. PCL chronic sprain  9. Lateral meniscus degeneration and tear    Signed by: Anali Escobedo 7/2/2025 9:45 AM  Dictation workstation:   [id number]    Assessment   1. Patellofemoral pain syndrome of right knee        2. Baker's cyst of knee, right        3. Patellar tracking disorder, right        4. Chronic pain of right knee        5. Primary osteoarthritis of right knee        6. Tricompartment osteoarthritis of right knee        7. Sprain of posterior cruciate ligament of knee, subsequent encounter            L Inj/Asp: R knee on 8/5/2025 9:25 AM  Indications: pain and diagnostic evaluation  Details: 22 G needle, ultrasound-guided lateral approach  Medications: 25 mg sodium hyaluronate 10 mg/mL  Outcome: tolerated well, no immediate complications  Procedure, treatment alternatives, risks and benefits explained, specific risks discussed. Consent was given by the patient. Immediately prior to procedure a time out was called to verify the correct patient, procedure, equipment, support staff and site/side marked as required. Patient was prepped and draped in the usual sterile fashion.            Procedure   Time Out (5 Minutes): Yes  Patient Name: Leanne Mims  YOB: 1950  Procedure: Supartz Injection 1/5  Needed Supplies Available: Correct Supplies  Laterality: Rightknee  Site Verified and Marked: Correct Site(s) Marked  Timeout Performed by Provider: Dr. Marshall Swanson D.O.  Staff Initials: jg    Patient is informed and consent has been signed by the  patient if over 18 years old., Patient parent and/or legal guardian is informed and consent has been signed., Patient and/or parent and/or legal guardian accept all risks, benefits, and possible complications associated with procedure(s) and/or manipulation(s) and/or injection(s)., Patient and/or parent and/or legal guardian deny patient allergy or known complications with any of the medications, instruments, products, and/or techniques used., All questions and concerns were answered and/or addressed with the patient and/or parent and/or legal guardian., The patient and/or parent and/or legal guardian agree to proceed with the procedure(s) and/or manipulation(s) and/or injection(s)., Prep: The area was cleansed with a mixture of equal parts rubbing alcohol 70% and Hibclens., Utilizing clean technique, the injection site(s) were marked with a skin marker., and Injection site(s) were anesthestized with topical analgesic spray prior to injection.    Performed regenerative Supartz Injection 1/5 into the patients Right knee, under ultrasound.  Supartz FX (25mg/2.5ml)     Band-aid and/or compressive bandage was placed over the injection site(s). After the injection(s) performed osteopathic manipulation therapy to the affected area(s) to help increase blood flow to aid with the healing process as well as circulation of the medication. The patient tolerated the procedure well without any complications. The patient was instructed to gently massage the treatment site(s) as well as to apply moist heat to the affected area(s). Additionally, the patient was instructed to contact the office immediately with any complications or concerns.    The nurse Ev was present in the room during the entire procedure.    The following discharge instructions were reviewed in detail with the patient to the level of their understanding:    PAIN:  A mild to moderate amount of discomfort, tenderness, stiffness, and achiness-caused by the  inflammation of the area can be expected for a few days after the injection. This is normal and good as the inflammation is an important part of the healing process.    SKIN: Due to the numbing spray used, you may develop a red, itchy, scaly rash in the area that was sprayed. Should your skin become itchy, wash the area with mild soap and warm water. If needed, you may apply topical over-the-counter Hydrocortisone cream to alleviate any itchiness. AVOID scratching due to risk of infection.,     BATHING/SWIMMING: You may shower after your procedure with regular soap and water, but no baths, no swimming, and no hot tubs for 3-4 days after the procedure.,     ACTIVITIES:  Immediately following the injection, you may resume daily activities (such as work) and light exercise. We suggest that you refrain from strenuous activity and heavy lifting, particularly the injured body part, for a week post-procedure, but sometimes this can change as well.    MOIST HEAT/ICE:  Moist heat may be used on the injection area. NO ICE.  MEDICATION: You may continue your prescribed medications and any over-the-counter supplements; however, it is not recommended to use aspirin or anti-inflammatories (Motrin, Advil, Aleve, Ibuprofen, Naproxen etc.) for up to 2 weeks post procedure. Tylenol Extra Strength, Tylenol Arthritis and/or any prescribed narcotics or muscle relaxants are OK to take.    APPOINTMENTS: You should have a follow-up appointment with Dr. Swanson scheduled 1-3 weeks as recommended after your procedure for your next round of injections or to follow-up after you have completed your injection series. Please schedule your follow-up appointment on your way out after your procedure, or call the office to schedule these appointments as soon as possible.    PRECAUTIONS: Smoking, caffeine, alcohol, sex and anti-inflammatories post-procedure may reduce the effectiveness of Prolotherapy/Neural Prolotherapy/Prolozone injections. Early,  rare post procedure problems that can be concerning are as follows: an increase in pain not relieved by medication (over the counter or prescription), a temperature above 101 degrees, bleeding or progressive, extreme swelling, or numbness.     CALL THE OFFICE OR GO TO THE EMERGENCY ROOM IF ANY OF THESE SYMPTOMS OCCUR.   If you have any questions or problems, please call our office at [phone]       Treatment or Intervention:  May continue to alternate ice and moist heat as needed  Reviewed medial meniscus, degenerative joint disease of the knees, patellofemoral tracking syndrome, and/or patellar tendinitis in detail with the patient to the level of their understanding;  at the time of this office visit.  Continue physical Therapy 1-2 times a week for 8-10 weeks with manual therapy as well as dry needling and IASTM  Once again reviewed home exercises to be performed by the patient routinely  Once again stressed the importance of wearing shoes with good stability control to help with the biomechanics affecting the knees and lower extremities  Once again stressed the importance of wearing full foot insoles to help with the biomechanics affecting the knees and lower extremities  Continue over-the-counter  vitamin-D 2 -3000+ milligrams a day, as well as a daily multivitamin.   Continue over-the-counter curcumin, turmeric, boswellia, as well as egg shell membrane as directed to aid with joint inflammation.  Continue over-the-counter Move Free for joint health.  Patient advised regarding the risks and/or potential adverse reactions and/or side effects of any prescribed medications along with any over-the-counter medications or any supplements used. Patient advised to seek immediate medical care if any adverse reactions occur. The patient and/or patient(s) parent(s) verbalized their understanding  Make sure to get into see vascular Medicine Dr. Ledezma for varicose veins and venous insufficiency  Reviewed knee MRI in  detail with the patient to their level of understanding; a copy of these results were provided to the patient at the time of this office visit.   Discussed with patient in detail that I do not think she is a good candidate for regenerative injections however we can get the viscosupplementation injections  She does not want to do a knee replacement this year but when she does she wants to get back into see Dr. Thorpe however after researching it she would rather try stem cells first to see if it allows her at least a couple more years or to put off knee replacement altogether.  Performed supartz injection #1 into the patients right knee under ultrasound. The patient tolerated the procedure well and without any adverse effects. Discharge instructions for VISCOSUPPLEMENTATION injections were reviewed in detail with the patient to the level of their understanding; a copy of these instructions were provided to the patient at the time of this office visit.  Follow-up in 1-2 weeks for Supartz #2      HIRA PETIT on 8/5/25 at 7:51 AM.     Please note that this report has been produced using speech recognition software.  It may contain errors related to grammar, punctuation or spelling.  Electronically signed, but not reviewed.      Marshall Swanson D.O. FAOASM         [1]   Family History  Problem Relation Name Age of Onset    Heart disease Mother Eunice Denis     Hypertension Mother Eunice Denis     Cancer Father Layo Denis

## 2025-08-05 ENCOUNTER — HOSPITAL ENCOUNTER (OUTPATIENT)
Dept: RADIOLOGY | Facility: EXTERNAL LOCATION | Age: 75
Discharge: HOME | End: 2025-08-05

## 2025-08-05 ENCOUNTER — OFFICE VISIT (OUTPATIENT)
Dept: ORTHOPEDIC SURGERY | Facility: CLINIC | Age: 75
End: 2025-08-05
Payer: MEDICARE

## 2025-08-05 VITALS
HEIGHT: 68 IN | BODY MASS INDEX: 28.34 KG/M2 | DIASTOLIC BLOOD PRESSURE: 84 MMHG | HEART RATE: 91 BPM | SYSTOLIC BLOOD PRESSURE: 130 MMHG | WEIGHT: 187 LBS

## 2025-08-05 DIAGNOSIS — M17.11 PRIMARY OSTEOARTHRITIS OF RIGHT KNEE: ICD-10-CM

## 2025-08-05 DIAGNOSIS — M17.11 TRICOMPARTMENT OSTEOARTHRITIS OF RIGHT KNEE: ICD-10-CM

## 2025-08-05 DIAGNOSIS — G89.29 CHRONIC PAIN OF RIGHT KNEE: ICD-10-CM

## 2025-08-05 DIAGNOSIS — M25.561 CHRONIC PAIN OF RIGHT KNEE: ICD-10-CM

## 2025-08-05 DIAGNOSIS — M22.8X1 PATELLAR TRACKING DISORDER, RIGHT: ICD-10-CM

## 2025-08-05 DIAGNOSIS — M71.21 BAKER'S CYST OF KNEE, RIGHT: ICD-10-CM

## 2025-08-05 DIAGNOSIS — M22.2X1 PATELLOFEMORAL PAIN SYNDROME OF RIGHT KNEE: ICD-10-CM

## 2025-08-05 DIAGNOSIS — S83.529D SPRAIN OF POSTERIOR CRUCIATE LIGAMENT OF KNEE, SUBSEQUENT ENCOUNTER: ICD-10-CM

## 2025-08-05 PROCEDURE — 1159F MED LIST DOCD IN RCRD: CPT | Performed by: FAMILY MEDICINE

## 2025-08-05 PROCEDURE — 3079F DIAST BP 80-89 MM HG: CPT | Performed by: FAMILY MEDICINE

## 2025-08-05 PROCEDURE — 1036F TOBACCO NON-USER: CPT | Performed by: FAMILY MEDICINE

## 2025-08-05 PROCEDURE — 2500000004 HC RX 250 GENERAL PHARMACY W/ HCPCS (ALT 636 FOR OP/ED): Performed by: FAMILY MEDICINE

## 2025-08-05 PROCEDURE — 1125F AMNT PAIN NOTED PAIN PRSNT: CPT | Performed by: FAMILY MEDICINE

## 2025-08-05 PROCEDURE — 3075F SYST BP GE 130 - 139MM HG: CPT | Performed by: FAMILY MEDICINE

## 2025-08-05 PROCEDURE — 99214 OFFICE O/P EST MOD 30 MIN: CPT | Performed by: FAMILY MEDICINE

## 2025-08-05 PROCEDURE — 20611 DRAIN/INJ JOINT/BURSA W/US: CPT | Mod: RT | Performed by: FAMILY MEDICINE

## 2025-08-05 RX ADMIN — SODIUM HYALURONATE INTRA-ARTICULAR SOLN PREF SYR 25 MG/2.5ML 25 MG: 25/2.5 SOLUTION PREFILLED SYRINGE at 09:25

## 2025-08-05 ASSESSMENT — PAIN SCALES - GENERAL
PAINLEVEL_OUTOF10: 4
PAINLEVEL_OUTOF10: 4

## 2025-08-05 ASSESSMENT — PAIN - FUNCTIONAL ASSESSMENT: PAIN_FUNCTIONAL_ASSESSMENT: 0-10

## 2025-08-05 ASSESSMENT — ENCOUNTER SYMPTOMS
OCCASIONAL FEELINGS OF UNSTEADINESS: 0
DEPRESSION: 0
LOSS OF SENSATION IN FEET: 0

## 2025-08-08 ENCOUNTER — EVALUATION (OUTPATIENT)
Dept: PHYSICAL THERAPY | Facility: CLINIC | Age: 75
End: 2025-08-08
Payer: MEDICARE

## 2025-08-08 DIAGNOSIS — M25.561 RIGHT KNEE PAIN: Primary | ICD-10-CM

## 2025-08-08 DIAGNOSIS — M62.81 MUSCLE WEAKNESS: ICD-10-CM

## 2025-08-08 PROCEDURE — 97161 PT EVAL LOW COMPLEX 20 MIN: CPT | Mod: GP

## 2025-08-08 PROCEDURE — 97110 THERAPEUTIC EXERCISES: CPT | Mod: GP

## 2025-08-12 ENCOUNTER — HOSPITAL ENCOUNTER (OUTPATIENT)
Dept: RADIOLOGY | Facility: EXTERNAL LOCATION | Age: 75
Discharge: HOME | End: 2025-08-12

## 2025-08-12 ENCOUNTER — OFFICE VISIT (OUTPATIENT)
Dept: ORTHOPEDIC SURGERY | Facility: CLINIC | Age: 75
End: 2025-08-12
Payer: MEDICARE

## 2025-08-12 VITALS
HEIGHT: 68 IN | WEIGHT: 187 LBS | DIASTOLIC BLOOD PRESSURE: 82 MMHG | HEART RATE: 68 BPM | SYSTOLIC BLOOD PRESSURE: 128 MMHG | BODY MASS INDEX: 28.34 KG/M2

## 2025-08-12 DIAGNOSIS — M23.41 BODIES, LOOSE, JOINT, KNEE, RIGHT: ICD-10-CM

## 2025-08-12 DIAGNOSIS — M25.561 CHRONIC PAIN OF RIGHT KNEE: ICD-10-CM

## 2025-08-12 DIAGNOSIS — M67.863 TENDINOSIS OF RIGHT KNEE: ICD-10-CM

## 2025-08-12 DIAGNOSIS — M71.21 BAKER'S CYST OF KNEE, RIGHT: ICD-10-CM

## 2025-08-12 DIAGNOSIS — M22.2X1 PATELLOFEMORAL PAIN SYNDROME OF RIGHT KNEE: ICD-10-CM

## 2025-08-12 DIAGNOSIS — M17.11 TRICOMPARTMENT OSTEOARTHRITIS OF RIGHT KNEE: ICD-10-CM

## 2025-08-12 DIAGNOSIS — M23.006 PERIMENISCAL CYST OF RIGHT KNEE: ICD-10-CM

## 2025-08-12 DIAGNOSIS — S83.529D SPRAIN OF POSTERIOR CRUCIATE LIGAMENT OF KNEE, SUBSEQUENT ENCOUNTER: ICD-10-CM

## 2025-08-12 DIAGNOSIS — M22.8X1 PATELLAR TRACKING DISORDER, RIGHT: ICD-10-CM

## 2025-08-12 DIAGNOSIS — M23.300 DEGENERATION OF LATERAL MENISCUS OF RIGHT KNEE: ICD-10-CM

## 2025-08-12 DIAGNOSIS — M23.329 DEGENERATION OF POSTERIOR HORN OF MEDIAL MENISCUS: ICD-10-CM

## 2025-08-12 DIAGNOSIS — G89.29 CHRONIC PAIN OF RIGHT KNEE: ICD-10-CM

## 2025-08-12 PROCEDURE — 2500000004 HC RX 250 GENERAL PHARMACY W/ HCPCS (ALT 636 FOR OP/ED): Performed by: FAMILY MEDICINE

## 2025-08-12 PROCEDURE — 1159F MED LIST DOCD IN RCRD: CPT | Performed by: FAMILY MEDICINE

## 2025-08-12 PROCEDURE — 3074F SYST BP LT 130 MM HG: CPT | Performed by: FAMILY MEDICINE

## 2025-08-12 PROCEDURE — 1036F TOBACCO NON-USER: CPT | Performed by: FAMILY MEDICINE

## 2025-08-12 PROCEDURE — 1125F AMNT PAIN NOTED PAIN PRSNT: CPT | Performed by: FAMILY MEDICINE

## 2025-08-12 PROCEDURE — 20611 DRAIN/INJ JOINT/BURSA W/US: CPT | Mod: RT | Performed by: FAMILY MEDICINE

## 2025-08-12 PROCEDURE — 99214 OFFICE O/P EST MOD 30 MIN: CPT | Performed by: FAMILY MEDICINE

## 2025-08-12 PROCEDURE — 3079F DIAST BP 80-89 MM HG: CPT | Performed by: FAMILY MEDICINE

## 2025-08-12 RX ADMIN — SODIUM HYALURONATE INTRA-ARTICULAR SOLN PREF SYR 25 MG/2.5ML 25 MG: 25/2.5 SOLUTION PREFILLED SYRINGE at 08:29

## 2025-08-12 ASSESSMENT — ENCOUNTER SYMPTOMS
OCCASIONAL FEELINGS OF UNSTEADINESS: 0
DEPRESSION: 0
LOSS OF SENSATION IN FEET: 0

## 2025-08-12 ASSESSMENT — PAIN SCALES - GENERAL: PAINLEVEL_OUTOF10: 2

## 2025-08-14 ENCOUNTER — TREATMENT (OUTPATIENT)
Dept: PHYSICAL THERAPY | Facility: CLINIC | Age: 75
End: 2025-08-14
Payer: MEDICARE

## 2025-08-14 DIAGNOSIS — M62.81 MUSCLE WEAKNESS: ICD-10-CM

## 2025-08-14 DIAGNOSIS — M25.561 RIGHT KNEE PAIN: ICD-10-CM

## 2025-08-14 PROCEDURE — 97110 THERAPEUTIC EXERCISES: CPT | Mod: GP

## 2025-08-21 ENCOUNTER — APPOINTMENT (OUTPATIENT)
Dept: ORTHOPEDIC SURGERY | Facility: CLINIC | Age: 75
End: 2025-08-21
Payer: MEDICARE

## 2025-08-26 ENCOUNTER — HOSPITAL ENCOUNTER (OUTPATIENT)
Dept: RADIOLOGY | Facility: EXTERNAL LOCATION | Age: 75
Discharge: HOME | End: 2025-08-26

## 2025-08-26 ENCOUNTER — OFFICE VISIT (OUTPATIENT)
Dept: ORTHOPEDIC SURGERY | Facility: CLINIC | Age: 75
End: 2025-08-26
Payer: MEDICARE

## 2025-08-26 VITALS
DIASTOLIC BLOOD PRESSURE: 70 MMHG | BODY MASS INDEX: 28.34 KG/M2 | HEIGHT: 68 IN | HEART RATE: 70 BPM | SYSTOLIC BLOOD PRESSURE: 124 MMHG | WEIGHT: 187 LBS

## 2025-08-26 DIAGNOSIS — M23.41 BODIES, LOOSE, JOINT, KNEE, RIGHT: ICD-10-CM

## 2025-08-26 DIAGNOSIS — S83.529D SPRAIN OF POSTERIOR CRUCIATE LIGAMENT OF KNEE, SUBSEQUENT ENCOUNTER: ICD-10-CM

## 2025-08-26 DIAGNOSIS — M17.11 TRICOMPARTMENT OSTEOARTHRITIS OF RIGHT KNEE: ICD-10-CM

## 2025-08-26 DIAGNOSIS — M25.561 CHRONIC PAIN OF RIGHT KNEE: ICD-10-CM

## 2025-08-26 DIAGNOSIS — M71.21 BAKER'S CYST OF KNEE, RIGHT: ICD-10-CM

## 2025-08-26 DIAGNOSIS — G89.29 CHRONIC PAIN OF RIGHT KNEE: ICD-10-CM

## 2025-08-26 DIAGNOSIS — M23.300 DEGENERATION OF LATERAL MENISCUS OF RIGHT KNEE: ICD-10-CM

## 2025-08-26 DIAGNOSIS — M67.863 TENDINOSIS OF RIGHT KNEE: ICD-10-CM

## 2025-08-26 DIAGNOSIS — M23.329 DEGENERATION OF POSTERIOR HORN OF MEDIAL MENISCUS: ICD-10-CM

## 2025-08-26 DIAGNOSIS — M23.006 PERIMENISCAL CYST OF RIGHT KNEE: ICD-10-CM

## 2025-08-26 DIAGNOSIS — M22.2X1 PATELLOFEMORAL PAIN SYNDROME OF RIGHT KNEE: ICD-10-CM

## 2025-08-26 DIAGNOSIS — M22.8X1 PATELLAR TRACKING DISORDER, RIGHT: ICD-10-CM

## 2025-08-26 PROCEDURE — 1159F MED LIST DOCD IN RCRD: CPT | Performed by: FAMILY MEDICINE

## 2025-08-26 PROCEDURE — 20611 DRAIN/INJ JOINT/BURSA W/US: CPT | Mod: RT | Performed by: FAMILY MEDICINE

## 2025-08-26 PROCEDURE — 20611 DRAIN/INJ JOINT/BURSA W/US: CPT | Performed by: FAMILY MEDICINE

## 2025-08-26 PROCEDURE — 3078F DIAST BP <80 MM HG: CPT | Performed by: FAMILY MEDICINE

## 2025-08-26 PROCEDURE — 2500000004 HC RX 250 GENERAL PHARMACY W/ HCPCS (ALT 636 FOR OP/ED): Performed by: FAMILY MEDICINE

## 2025-08-26 PROCEDURE — 3074F SYST BP LT 130 MM HG: CPT | Performed by: FAMILY MEDICINE

## 2025-08-26 PROCEDURE — 1125F AMNT PAIN NOTED PAIN PRSNT: CPT | Performed by: FAMILY MEDICINE

## 2025-08-26 PROCEDURE — 1036F TOBACCO NON-USER: CPT | Performed by: FAMILY MEDICINE

## 2025-08-26 PROCEDURE — 99214 OFFICE O/P EST MOD 30 MIN: CPT | Performed by: FAMILY MEDICINE

## 2025-08-26 RX ADMIN — SODIUM HYALURONATE INTRA-ARTICULAR SOLN PREF SYR 25 MG/2.5ML 25 MG: 25/2.5 SOLUTION PREFILLED SYRINGE at 08:45

## 2025-08-26 ASSESSMENT — PAIN SCALES - GENERAL: PAINLEVEL_OUTOF10: 1

## 2025-08-28 ENCOUNTER — TREATMENT (OUTPATIENT)
Dept: PHYSICAL THERAPY | Facility: CLINIC | Age: 75
End: 2025-08-28
Payer: MEDICARE

## 2025-08-28 DIAGNOSIS — M25.561 RIGHT KNEE PAIN: ICD-10-CM

## 2025-08-28 DIAGNOSIS — M62.81 MUSCLE WEAKNESS: ICD-10-CM

## 2025-08-28 PROCEDURE — 97110 THERAPEUTIC EXERCISES: CPT | Mod: GP

## 2025-09-02 ENCOUNTER — HOSPITAL ENCOUNTER (OUTPATIENT)
Dept: RADIOLOGY | Facility: EXTERNAL LOCATION | Age: 75
Discharge: HOME | End: 2025-09-02

## 2025-09-02 ENCOUNTER — OFFICE VISIT (OUTPATIENT)
Dept: ORTHOPEDIC SURGERY | Facility: CLINIC | Age: 75
End: 2025-09-02
Payer: MEDICARE

## 2025-09-02 VITALS — BODY MASS INDEX: 28.34 KG/M2 | WEIGHT: 187 LBS | HEIGHT: 68 IN

## 2025-09-02 DIAGNOSIS — M22.8X1 PATELLAR TRACKING DISORDER, RIGHT: ICD-10-CM

## 2025-09-02 DIAGNOSIS — M22.2X1 PATELLOFEMORAL PAIN SYNDROME OF RIGHT KNEE: ICD-10-CM

## 2025-09-02 DIAGNOSIS — M67.863 TENDINOSIS OF RIGHT KNEE: ICD-10-CM

## 2025-09-02 DIAGNOSIS — M23.006 PERIMENISCAL CYST OF RIGHT KNEE: ICD-10-CM

## 2025-09-02 DIAGNOSIS — M23.329 DEGENERATION OF POSTERIOR HORN OF MEDIAL MENISCUS: ICD-10-CM

## 2025-09-02 DIAGNOSIS — S83.529D SPRAIN OF POSTERIOR CRUCIATE LIGAMENT OF KNEE, SUBSEQUENT ENCOUNTER: ICD-10-CM

## 2025-09-02 DIAGNOSIS — M23.41 BODIES, LOOSE, JOINT, KNEE, RIGHT: ICD-10-CM

## 2025-09-02 DIAGNOSIS — M71.21 BAKER'S CYST OF KNEE, RIGHT: ICD-10-CM

## 2025-09-02 DIAGNOSIS — M25.561 CHRONIC PAIN OF RIGHT KNEE: ICD-10-CM

## 2025-09-02 DIAGNOSIS — M23.300 DEGENERATION OF LATERAL MENISCUS OF RIGHT KNEE: ICD-10-CM

## 2025-09-02 DIAGNOSIS — G89.29 CHRONIC PAIN OF RIGHT KNEE: ICD-10-CM

## 2025-09-02 DIAGNOSIS — M17.11 TRICOMPARTMENT OSTEOARTHRITIS OF RIGHT KNEE: ICD-10-CM

## 2025-09-02 PROCEDURE — 99214 OFFICE O/P EST MOD 30 MIN: CPT | Performed by: FAMILY MEDICINE

## 2025-09-02 PROCEDURE — 20611 DRAIN/INJ JOINT/BURSA W/US: CPT | Mod: RT | Performed by: FAMILY MEDICINE

## 2025-09-02 PROCEDURE — 1125F AMNT PAIN NOTED PAIN PRSNT: CPT | Performed by: FAMILY MEDICINE

## 2025-09-02 PROCEDURE — 1159F MED LIST DOCD IN RCRD: CPT | Performed by: FAMILY MEDICINE

## 2025-09-02 PROCEDURE — 2500000004 HC RX 250 GENERAL PHARMACY W/ HCPCS (ALT 636 FOR OP/ED): Performed by: FAMILY MEDICINE

## 2025-09-02 PROCEDURE — 1036F TOBACCO NON-USER: CPT | Performed by: FAMILY MEDICINE

## 2025-09-02 RX ORDER — PREDNISONE 10 MG/1
TABLET ORAL
COMMUNITY
Start: 2025-08-28

## 2025-09-02 RX ADMIN — SODIUM HYALURONATE INTRA-ARTICULAR SOLN PREF SYR 25 MG/2.5ML 25 MG: 25/2.5 SOLUTION PREFILLED SYRINGE at 08:20

## 2025-09-02 ASSESSMENT — ENCOUNTER SYMPTOMS
LOSS OF SENSATION IN FEET: 0
DEPRESSION: 0
OCCASIONAL FEELINGS OF UNSTEADINESS: 0

## 2025-09-02 ASSESSMENT — PAIN SCALES - GENERAL: PAINLEVEL_OUTOF10: 6

## 2025-09-05 ENCOUNTER — TREATMENT (OUTPATIENT)
Dept: PHYSICAL THERAPY | Facility: CLINIC | Age: 75
End: 2025-09-05
Payer: MEDICARE

## 2025-09-05 DIAGNOSIS — M62.81 MUSCLE WEAKNESS: ICD-10-CM

## 2025-09-05 DIAGNOSIS — M25.561 RIGHT KNEE PAIN: ICD-10-CM

## 2025-09-05 PROCEDURE — 97110 THERAPEUTIC EXERCISES: CPT | Mod: GP,CQ

## 2025-09-05 PROCEDURE — 97112 NEUROMUSCULAR REEDUCATION: CPT | Mod: GP,CQ

## 2025-10-06 ENCOUNTER — APPOINTMENT (OUTPATIENT)
Dept: RADIOLOGY | Facility: CLINIC | Age: 75
End: 2025-10-06
Payer: MEDICARE

## (undated) DEVICE — CEMENT, MIXEVAC III, 10S BOWL, KNEES

## (undated) DEVICE — SOLUTION, IRRIGATION, X RX SODIUM CHL 0.9%, 1000ML BTL

## (undated) DEVICE — ADHESIVE, SKIN, DERMABOND ADVANCED, 15CM, PEN-STYLE

## (undated) DEVICE — Device

## (undated) DEVICE — BITE BLOCK, SOFT, MOUTH, 3/4 X 4, LARGE, WHITE

## (undated) DEVICE — RESERVOIR, WOUND, W/TROCAR, PVC, MEDIUM, 400CC, DAVOL, 1/8 IN, 10FR

## (undated) DEVICE — SOLUTION, IRRIGATION, USP, SODIUM CHLORIDE 0.9%, 3000 ML, BAG

## (undated) DEVICE — BLADE, SAW, SAGITTAL, 18.0 X 1.27 X 90MM

## (undated) DEVICE — CATHETER TRAY, URETHRAL, FOLEY, 16 FR, SILICONE

## (undated) DEVICE — HANDPIECE, INTERPULSE, W/RETRACTABLE COAX FAN, STERILE

## (undated) DEVICE — GLOVE, SURGICAL, PROTEXIS PI BLUE W/NEUTHERA, 8.0, PF, LF

## (undated) DEVICE — SUTURE, VICRYL, 0, 36 IN, CT-1, UNDYED

## (undated) DEVICE — GLOVE, SURGICAL, PROTEXIS PI , 7.5, PF, LF

## (undated) DEVICE — SUTURE, ETHIBOND, P2, V-37, 30 IN, GREEN

## (undated) DEVICE — DRESSING, MEPILEX BORDER, POST-OP AG, 4 X 12 IN

## (undated) DEVICE — SUTURE, MONOCRYL, 4-0, 27 IN, PS-2, UNDYED

## (undated) DEVICE — SOLUTION, INJECTION, USP, LACTATED RINGERS, LIFECARE, 1000ML

## (undated) DEVICE — IRRIGATION SYSTEM, WOUND, SURGIPHOR, 450ML, STERILE

## (undated) DEVICE — SUTURE, VICRYL, 2-0, 36 IN, CT-1, UNDYED